# Patient Record
Sex: FEMALE | Race: BLACK OR AFRICAN AMERICAN | NOT HISPANIC OR LATINO | Employment: PART TIME | URBAN - METROPOLITAN AREA
[De-identification: names, ages, dates, MRNs, and addresses within clinical notes are randomized per-mention and may not be internally consistent; named-entity substitution may affect disease eponyms.]

---

## 2017-12-20 ENCOUNTER — OFFICE VISIT (OUTPATIENT)
Dept: FAMILY MEDICINE | Facility: CLINIC | Age: 27
End: 2017-12-20
Payer: MEDICAID

## 2017-12-20 VITALS
DIASTOLIC BLOOD PRESSURE: 100 MMHG | BODY MASS INDEX: 47.09 KG/M2 | HEIGHT: 66 IN | RESPIRATION RATE: 18 BRPM | WEIGHT: 293 LBS | OXYGEN SATURATION: 100 % | TEMPERATURE: 99.1 F | SYSTOLIC BLOOD PRESSURE: 142 MMHG | HEART RATE: 78 BPM

## 2017-12-20 DIAGNOSIS — L02.91 ABSCESS: ICD-10-CM

## 2017-12-20 DIAGNOSIS — Z72.0 TOBACCO ABUSE: ICD-10-CM

## 2017-12-20 DIAGNOSIS — I10 HYPERTENSION GOAL BP (BLOOD PRESSURE) < 140/90: Primary | ICD-10-CM

## 2017-12-20 DIAGNOSIS — E66.01 MORBID OBESITY WITH BMI OF 45.0-49.9, ADULT (H): ICD-10-CM

## 2017-12-20 DIAGNOSIS — N64.4 BREAST PAIN: ICD-10-CM

## 2017-12-20 PROCEDURE — 99203 OFFICE O/P NEW LOW 30 MIN: CPT | Performed by: FAMILY MEDICINE

## 2017-12-20 RX ORDER — LISINOPRIL AND HYDROCHLOROTHIAZIDE 20; 25 MG/1; MG/1
TABLET ORAL
COMMUNITY
Start: 2017-07-19 | End: 2017-12-20

## 2017-12-20 RX ORDER — TRIAMCINOLONE ACETONIDE 1 MG/G
CREAM TOPICAL 3 TIMES DAILY PRN
Qty: 30 G | Refills: 0 | Status: SHIPPED | OUTPATIENT
Start: 2017-12-20 | End: 2018-08-03

## 2017-12-20 RX ORDER — AMLODIPINE BESYLATE 5 MG/1
5 TABLET ORAL DAILY
Qty: 30 TABLET | Refills: 2 | Status: SHIPPED | OUTPATIENT
Start: 2017-12-20 | End: 2018-05-14

## 2017-12-20 RX ORDER — LISINOPRIL AND HYDROCHLOROTHIAZIDE 20; 25 MG/1; MG/1
1 TABLET ORAL DAILY
Qty: 30 TABLET | Refills: 2 | Status: SHIPPED | OUTPATIENT
Start: 2017-12-20 | End: 2018-05-14

## 2017-12-20 RX ORDER — MUPIROCIN CALCIUM 20 MG/G
CREAM TOPICAL 3 TIMES DAILY
Qty: 30 G | Refills: 0 | Status: SHIPPED | OUTPATIENT
Start: 2017-12-20 | End: 2018-07-02

## 2017-12-20 RX ORDER — OXYCODONE AND ACETAMINOPHEN 7.5; 325 MG/1; MG/1
1 TABLET ORAL 3 TIMES DAILY PRN
Qty: 40 TABLET | Refills: 0 | Status: SHIPPED | OUTPATIENT
Start: 2017-12-20 | End: 2018-05-14

## 2017-12-20 ASSESSMENT — PAIN SCALES - GENERAL: PAINLEVEL: WORST PAIN (10)

## 2017-12-20 NOTE — MR AVS SNAPSHOT
"              After Visit Summary   12/20/2017    Jigna Amador    MRN: [id number]           Patient Information     Date Of Birth          1990        Visit Information        Provider Department      12/20/2017 2:40 PM Barbara Esquivel MD [facility]        Today's Diagnoses     Hypertension goal BP (blood pressure) < 140/90    -  1    Morbid obesity with BMI of 45.0-49.9, adult (H)        Breast pain        Abscess        Tobacco abuse           Follow-ups after your visit        Follow-up notes from your care team     Return in about 6 weeks (around 1/31/2018).      Who to contact     If you have questions or need follow up information about today's clinic visit or your schedule please contact [facility] directly at [phone].  Normal or non-critical lab and imaging results will be communicated to you by MyChart, letter or phone within 4 business days after the clinic has received the results. If you do not hear from us within 7 days, please contact the clinic through MyChart or phone. If you have a critical or abnormal lab result, we will notify you by phone as soon as possible.  Submit refill requests through [employer] or call your pharmacy and they will forward the refill request to us. Please allow 3 business days for your refill to be completed.          Additional Information About Your Visit        MyChart Information     [employer] lets you send messages to your doctor, view your test results, renew your prescriptions, schedule appointments and more. To sign up, go to [email or web address] . Click on \"Log in\" on the left side of the screen, which will take you to the Welcome page. Then click on \"Sign up Now\" on the right side of the page.     You will be asked to enter the access code listed below, as well as some personal information. Please follow the directions to create your username and password.     Your access code is: [email or web address]  Expires: 3/20/2018  3:28 " "PM     Your access code will  in 90 days. If you need help or a new code, please call your [place] clinic or [phone].        Care EveryWhere ID     This is your Care EveryWhere ID. This could be used by other organizations to access your [place] medical records  [id number]        Your Vitals Were     Pulse Temperature Respirations Height Last Period Pulse Oximetry    78 99.1  F (37.3  C) (Oral) 18 1.67 m (5' 5.75\") 2017 100%    BMI (Body Mass Index)                   49.12 kg/m2            Blood Pressure from Last 3 Encounters:   17 (!) 142/100    Weight from Last 3 Encounters:   17 (!) 137 kg (302 lb)              Today, you had the following     No orders found for display         Today's Medication Changes          These changes are accurate as of: 17  3:28 PM.  If you have any questions, ask your nurse or doctor.               Start taking these medicines.        Dose/Directions    amLODIPine 5 MG tablet   Commonly known as:  NORVASC   Used for:  Hypertension goal BP (blood pressure) < 140/90   Started by:  Barbara Esquivel MD        Dose:  5 mg   Take 1 tablet (5 mg) by mouth daily   Quantity:  30 tablet   Refills:  2       mupirocin 2 % cream   Commonly known as:  BACTROBAN   Used for:  Abscess   Started by:  Barbara Esquivel MD        Apply topically 3 times daily   Quantity:  30 g   Refills:  0       oxyCODONE-acetaminophen 7.5-325 MG per tablet   Commonly known as:  PERCOCET   Used for:  Breast pain   Started by:  Barbara Esquivel MD        Dose:  1 tablet   Take 1 tablet by mouth 3 times daily as needed for pain   Quantity:  40 tablet   Refills:  0       triamcinolone 0.1 % cream   Commonly known as:  KENALOG   Used for:  Abscess   Started by:  Barbara Esquivel MD        Apply topically 3 times daily as needed for irritation   Quantity:  30 g   Refills:  0         These medicines have changed or have updated prescriptions.        " Dose/Directions    lisinopril-hydrochlorothiazide 20-25 MG per tablet   Commonly known as:  PRINZIDE/ZESTORETIC   This may have changed:    - how much to take  - when to take this   Used for:  Hypertension goal BP (blood pressure) < 140/90   Changed by:  Barbara Esquivel MD        Dose:  1 tablet   Take 1 tablet by mouth daily   Quantity:  30 tablet   Refills:  2            Where to get your medicines      These medications were sent to [facility] [address] [place] [address] [address] [facility]  [address] [facility] [address]     Phone:  [phone]     amLODIPine 5 MG tablet    lisinopril-hydrochlorothiazide 20-25 MG per tablet    mupirocin 2 % cream    triamcinolone 0.1 % cream         Some of these will need a paper prescription and others can be bought over the counter.  Ask your nurse if you have questions.     Bring a paper prescription for each of these medications     oxyCODONE-acetaminophen 7.5-325 MG per tablet                Primary Care Provider Office Phone # Fax #    Barbara Esquivel [phone] [address] [facility] [address]        Equal Access to Services     [facility] HospitalJAKOB AH: Hadii aad ku hadasho Soomaali, waaxda luqadaha, qaybta kaalmada adeegyada, waxay idiin hayaan adeemmy kharasofi bess. So [facility] [phone].    ATENCIÓN: Si habla español, tiene a harris disposición servicios gratGuadalupe County Hospitalos de asistencia lingüística. Llame al [phone].    We comply with applicable federal civil rights laws and [place] laws. We do not discriminate on the basis of race, color, national origin, age, disability, sex, sexual orientation, or gender identity.            Thank you!     Thank you for choosing [facility]  for your care. Our goal is always to provide you with excellent care. Hearing back from our patients is one way we can continue to improve our services. Please take a few minutes to complete the written survey that you may  receive in the mail after your visit with us. Thank you!             Your Updated Medication List - Protect others around you: Learn how to safely use, store and throw away your medicines at www.disposemymeds.org.          This list is accurate as of: 12/20/17  3:28 PM.  Always use your most recent med list.                   Brand Name Dispense Instructions for use Diagnosis    amLODIPine 5 MG tablet    NORVASC    30 tablet    Take 1 tablet (5 mg) by mouth daily    Hypertension goal BP (blood pressure) < 140/90       lisinopril-hydrochlorothiazide 20-25 MG per tablet    PRINZIDE/ZESTORETIC    30 tablet    Take 1 tablet by mouth daily    Hypertension goal BP (blood pressure) < 140/90       mupirocin 2 % cream    BACTROBAN    30 g    Apply topically 3 times daily    Abscess       oxyCODONE-acetaminophen 7.5-325 MG per tablet    PERCOCET    40 tablet    Take 1 tablet by mouth 3 times daily as needed for pain    Breast pain       triamcinolone 0.1 % cream    KENALOG    30 g    Apply topically 3 times daily as needed for irritation    Abscess

## 2017-12-20 NOTE — NURSING NOTE
"No chief complaint on file.      Initial Ht 1.67 m (5' 5.75\")  Wt (!) 137 kg (302 lb)  LMP 12/12/2017  BMI 49.12 kg/m2 Estimated body mass index is 49.12 kg/(m^2) as calculated from the following:    Height as of this encounter: 1.67 m (5' 5.75\").    Weight as of this encounter: 137 kg (302 lb).  Medication Reconciliation: complete     Kari Rivera      "

## 2017-12-20 NOTE — PROGRESS NOTES
SUBJECTIVE:   Jigna Amador is a 27 year old female who presents to clinic today for the following health issues:    Patient has Metoprolol on hand, but does not take it.     Concern - Breast pain   Onset: 2 weeks ago     Description:   Severe sharp pain in breasts     Intensity: severe    Progression of Symptoms:  worsening    Accompanying Signs & Symptoms:  None     Previous history of similar problem:   Patient had it over a year ago    Precipitating factors:   Worsened by: By the touch    Alleviating factors:  Improved by: Dermatologist prescribed Rx cream, which did work in the past.     Therapies Tried and outcome: None     SUBJECTIVE:  Here today to establish care regarding a few issues. Previous records reviewed through care everywhere and with the patient. Is under treatment for hypertension and has been prescribed Zestoretic and metoprolol. However the metoprolol makes her feel funny and she hasn't been taking it. Not surprisingly her blood pressure is elevated. Still with some prediabetes and morbid obesity as well. Has been counseled on dietary and exercise measures weight loss is been somewhat fruitless.  Has dealt with large breasts and even met with a plastic surgeon to consider mammo-pexing in the past. But was told she needs to quit smoking first and has not successfully done so. Looks like she was dealing with some chronic pain related to the breast as well and had been receiving 60+ Percocet 7.5 tablets for a number of months until about 6 months ago. Her provider left through [facility] and she was referred to a pain clinic but did not make that appointment. I discussed with the patient that I don't think the ongoing use of pain medication isn't appropriate treatment for her and I'm certainly not taking on new chronic pain patients. We can help temporarily but I would refer her to pain clinic as well. Has dealt with recurrent abscesses on her breast tissue and saw a dermatologist earlier  "this year. He prescribed some antibiotic ointment and triamcinolone. She lost her medical and was unable to get this.    Review of systems otherwise negative.  Past medical, family, and social history reviewed and updated in chart.    OBJECTIVE:  BP (!) 142/100 (BP Location: Right arm, Patient Position: Sitting, Cuff Size: Adult Large)  Pulse 78  Temp 99.1  F (37.3  C) (Oral)  Resp 18  Ht 1.67 m (5' 5.75\")  Wt (!) 137 kg (302 lb)  LMP 12/12/2017  SpO2 100%  BMI 49.12 kg/m2  Alert, pleasant, upbeat, and in no apparent discomfort.  Morbidly obese  S1 and S2 normal, no murmurs, clicks, gallops or rubs. Regular rate and rhythm. Chest is clear; no wheezes or rales. No edema or JVD.  Pendulous breasts with some scar tissue in place. There are couple of small healing abscesses with some crusted tissue present  Reviewed previous lab work through [facility]    ASSESSMENT / PLAN:  (I10) Hypertension goal BP (blood pressure) < 140/90  (primary encounter diagnosis)  Comment: Not adequately controlled and it sounds as though metoprolol is giving her side effects. I think a calcium channel blocker would be a good choice for her and we will continue Zestoretic and start amlodipine  Plan: lisinopril-hydrochlorothiazide         (PRINZIDE/ZESTORETIC) 20-25 MG per tablet,         amLODIPine (NORVASC) 5 MG tablet        Follow-up in 6-8 weeks for recheck    (E66.01,  Z68.42) Morbid obesity with BMI of 45.0-49.9, adult (H)  Comment: \"Counseled on dietary and weight loss measures  Plan:     (N64.4) Breast pain  Comment: I am not taking her on as a new chronic pain patient. I provided resources for [facility] pain clinic and will provide 1 small refill on  Plan: oxyCODONE-acetaminophen (PERCOCET) 7.5-325 MG         per tablet            (L02.91) Abscess  Comment: Discussed mechanism of action of the proposed medication, as well as potential effects, both good and bad.  Patient expressed understanding and agreed with " treatment.   Plan: mupirocin (BACTROBAN) 2 % cream, triamcinolone         (KENALOG) 0.1 % cream            (Z72.0) Tobacco abuse  Comment: Counseled to quit smoking  Plan:     Follow up 6-8 weeks for blood pressure recheck  SEwa Esquivel MD    (Chart documentation completed in part with Dragon voice-recognition software.  Even though reviewed some grammatical, spelling, and word errors may remain.)

## 2017-12-21 ENCOUNTER — TELEPHONE (OUTPATIENT)
Dept: FAMILY MEDICINE | Facility: CLINIC | Age: 27
End: 2017-12-21

## 2017-12-21 DIAGNOSIS — L02.91 ABSCESS: Primary | ICD-10-CM

## 2017-12-22 RX ORDER — CEPHALEXIN 500 MG/1
500 CAPSULE ORAL 4 TIMES DAILY
Qty: 40 CAPSULE | Refills: 0 | Status: SHIPPED | OUTPATIENT
Start: 2017-12-22 | End: 2018-06-12

## 2017-12-22 NOTE — TELEPHONE ENCOUNTER
Prescription for kelfex 500mg four times a day for 10 days.   Follow up if no improvement over the next 3-4 days.  Return urgently if any change in symptoms.    Please call and inform patient

## 2017-12-22 NOTE — TELEPHONE ENCOUNTER
Per fax from [facility] ([phone]) PA denied for Bactroban 2%. Please send in alternative rx.

## 2017-12-24 ENCOUNTER — HEALTH MAINTENANCE LETTER (OUTPATIENT)
Age: 27
End: 2017-12-24

## 2018-05-14 ENCOUNTER — OFFICE VISIT (OUTPATIENT)
Dept: FAMILY MEDICINE | Facility: CLINIC | Age: 28
End: 2018-05-14
Payer: COMMERCIAL

## 2018-05-14 VITALS
OXYGEN SATURATION: 100 % | TEMPERATURE: 97.9 F | DIASTOLIC BLOOD PRESSURE: 98 MMHG | WEIGHT: 293 LBS | HEIGHT: 66 IN | HEART RATE: 76 BPM | SYSTOLIC BLOOD PRESSURE: 162 MMHG | BODY MASS INDEX: 47.09 KG/M2 | RESPIRATION RATE: 18 BRPM

## 2018-05-14 DIAGNOSIS — Z12.4 SCREENING FOR MALIGNANT NEOPLASM OF CERVIX: ICD-10-CM

## 2018-05-14 DIAGNOSIS — L02.92 BOIL: ICD-10-CM

## 2018-05-14 DIAGNOSIS — I10 HYPERTENSION GOAL BP (BLOOD PRESSURE) < 140/90: Primary | ICD-10-CM

## 2018-05-14 PROCEDURE — 99214 OFFICE O/P EST MOD 30 MIN: CPT | Performed by: FAMILY MEDICINE

## 2018-05-14 RX ORDER — LISINOPRIL AND HYDROCHLOROTHIAZIDE 20; 25 MG/1; MG/1
1 TABLET ORAL DAILY
Qty: 90 TABLET | Refills: 0 | Status: SHIPPED | OUTPATIENT
Start: 2018-05-14 | End: 2018-06-12

## 2018-05-14 RX ORDER — CEPHALEXIN 500 MG/1
500 CAPSULE ORAL 3 TIMES DAILY
Qty: 21 CAPSULE | Refills: 0 | Status: SHIPPED | OUTPATIENT
Start: 2018-05-14 | End: 2018-05-21

## 2018-05-14 RX ORDER — OXYCODONE AND ACETAMINOPHEN 7.5; 325 MG/1; MG/1
1 TABLET ORAL 3 TIMES DAILY PRN
Qty: 40 TABLET | Refills: 0 | Status: SHIPPED | OUTPATIENT
Start: 2018-05-14 | End: 2018-06-12

## 2018-05-14 RX ORDER — AMLODIPINE BESYLATE 10 MG/1
10 TABLET ORAL DAILY
Qty: 90 TABLET | Refills: 0 | Status: SHIPPED | OUTPATIENT
Start: 2018-05-14 | End: 2018-06-12

## 2018-05-14 ASSESSMENT — PAIN SCALES - GENERAL: PAINLEVEL: WORST PAIN (10)

## 2018-05-14 NOTE — Clinical Note
Please abstract the following data from this visit with this patient into the appropriate field in Epic:  Pap smear done on this date: 6/24/14 (approximately), by this group: [facility], results were normal.

## 2018-05-14 NOTE — MR AVS SNAPSHOT
"              After Visit Summary   5/14/2018    Jigna Amador    MRN: [id number]           Patient Information     Date Of Birth          1990        Visit Information        Provider Department      5/14/2018 12:00 PM Barbara Esquivel MD [facility]        Today's Diagnoses     Hypertension goal BP (blood pressure) < 140/90    -  1    Boil        Screening for malignant neoplasm of cervix           Follow-ups after your visit        Follow-up notes from your care team     Return in about 3 months (around 8/14/2018).      Your next 10 appointments already scheduled     Jun 12, 2018  2:20 PM CDT   PHYSICAL with Milana Amaro PA-C   [facility]    [address] [facility] [address] [phone]              Who to contact     If you have questions or need follow up information about today's clinic visit or your schedule please contact [facility] directly at [phone].  Normal or non-critical lab and imaging results will be communicated to you by [employer], letter or phone within 4 business days after the clinic has received the results. If you do not hear from us within 7 days, please contact the clinic through [employer] or phone. If you have a critical or abnormal lab result, we will notify you by phone as soon as possible.  Submit refill requests through [employer] or call your pharmacy and they will forward the refill request to us. Please allow 3 business days for your refill to be completed.          Additional Information About Your Visit        MyChart Information     [employer] lets you send messages to your doctor, view your test results, renew your prescriptions, schedule appointments and more. To sign up, go to [email or web address] . Click on \"Log in\" on the left side of the screen, which will take you to the Welcome page. Then click on \"Sign up Now\" on the right side of the page.     You will be " "asked to enter the access code listed below, as well as some personal information. Please follow the directions to create your username and password.     Your access code is: [email or web address]  Expires: 2018 12:45 PM     Your access code will  in 90 days. If you need help or a new code, please call your [place] clinic or [phone].        Care EveryWhere ID     This is your Care EveryWhere ID. This could be used by other organizations to access your [place] medical records  [id number]        Your Vitals Were     Pulse Temperature Respirations Height Last Period Pulse Oximetry    76 97.9  F (36.6  C) (Oral) 18 1.664 m (5' 5.5\") 2018 100%    BMI (Body Mass Index)                   48.67 kg/m2            Blood Pressure from Last 3 Encounters:   18 (!) 162/98   17 (!) 142/100    Weight from Last 3 Encounters:   18 134.7 kg (297 lb)   17 (!) 137 kg (302 lb)              Today, you had the following     No orders found for display         Today's Medication Changes          These changes are accurate as of 18 12:45 PM.  If you have any questions, ask your nurse or doctor.               These medicines have changed or have updated prescriptions.        Dose/Directions    amLODIPine 10 MG tablet   Commonly known as:  NORVASC   This may have changed:    - medication strength  - how much to take   Used for:  Hypertension goal BP (blood pressure) < 140/90   Changed by:  Barbara Esquivel MD        Dose:  10 mg   Take 1 tablet (10 mg) by mouth daily   Quantity:  90 tablet   Refills:  0       * cephALEXin 500 MG capsule   Commonly known as:  KEFLEX   This may have changed:  Another medication with the same name was added. Make sure you understand how and when to take each.   Used for:  Abscess   Changed by:  Barbara Esquivel MD        Dose:  500 mg   Take 1 capsule (500 mg) by mouth 4 times daily   Quantity:  40 capsule   Refills:  0       * cephALEXin 500 MG capsule "   Commonly known as:  KEFLEX   This may have changed:  You were already taking a medication with the same name, and this prescription was added. Make sure you understand how and when to take each.   Used for:  Boil   Changed by:  Barbara Esquivel MD        Dose:  500 mg   Take 1 capsule (500 mg) by mouth 3 times daily for 7 days   Quantity:  21 capsule   Refills:  0       * Notice:  This list has 2 medication(s) that are the same as other medications prescribed for you. Read the directions carefully, and ask your doctor or other care provider to review them with you.         Where to get your medicines      These medications were sent to [facility] [address] [place] [address] [address] [facility]  [address] [facility] [address]     Phone:  [phone]     amLODIPine 10 MG tablet    cephALEXin 500 MG capsule    lisinopril-hydrochlorothiazide 20-25 MG per tablet         Some of these will need a paper prescription and others can be bought over the counter.  Ask your nurse if you have questions.     Bring a paper prescription for each of these medications     oxyCODONE-acetaminophen 7.5-325 MG per tablet               Information about OPIOIDS     PRESCRIPTION OPIOIDS: WHAT YOU NEED TO KNOW   You have a prescription for an opioid (narcotic) pain medicine. Opioids can cause addiction. If you have a history of chemical dependency of any type, you are at a higher risk of becoming addicted to opioids. Only take this medicine after all other options have been tried. Take it for as short a time and as few doses as possible.     Do not:    Drive. If you drive while taking these medicines, you could be arrested for driving under the influence (DUI).    Operate heavy machinery    Do any other dangerous activities while taking these medicines.     Drink any alcohol while taking these medicines.      Take with any other medicines that contain acetaminophen. Read all labels carefully. Look for the word   acetaminophen  or  Tylenol.  Ask your pharmacist if you have questions or are unsure.    Store your pills in a secure place, locked if possible. We will not replace any lost or stolen medicine. If you don t finish your medicine, please throw away (dispose) as directed by your pharmacist. The [place] Pollution [employer] Agency has more information about safe disposal: https://www.pca.[facility].[place]/living-green/managing-unwanted-medications    All opioids tend to cause constipation. Drink plenty of water and eat foods that have a lot of fiber, such as fruits, vegetables, prune juice, apple juice and high-fiber cereal. Take a laxative (Miralax, milk of magnesia, Colace, Senna) if you don t move your bowels at least every other day.          Primary Care Provider Office Phone # Fax #    Barbara Jose Esquivel [phone] [address] [facility] [address]        Equal Access to Services     NURY URIARTE : Hadii juni ku hadasho Soomaali, waaxda luqadaha, qaybta kaalmada adeegyada, waxay bradyin haychas kumar . So [facility] [phone].    ATENCIÓN: Si habla español, tiene a harris disposición servicios gratuitos de asistencia lingüística. Llame al [phone].    We comply with applicable federal civil rights laws and [place] laws. We do not discriminate on the basis of race, color, national origin, age, disability, sex, sexual orientation, or gender identity.            Thank you!     Thank you for choosing [facility]  for your care. Our goal is always to provide you with excellent care. Hearing back from our patients is one way we can continue to improve our services. Please take a few minutes to complete the written survey that you may receive in the mail after your visit with us. Thank you!             Your Updated Medication List - Protect others around you: Learn how to safely use, store and throw away your medicines at www.disposemymeds.org.          This list  is accurate as of 5/14/18 12:45 PM.  Always use your most recent med list.                   Brand Name Dispense Instructions for use Diagnosis    amLODIPine 10 MG tablet    NORVASC    90 tablet    Take 1 tablet (10 mg) by mouth daily    Hypertension goal BP (blood pressure) < 140/90       * cephALEXin 500 MG capsule    KEFLEX    40 capsule    Take 1 capsule (500 mg) by mouth 4 times daily    Abscess       * cephALEXin 500 MG capsule    KEFLEX    21 capsule    Take 1 capsule (500 mg) by mouth 3 times daily for 7 days    Boil       lisinopril-hydrochlorothiazide 20-25 MG per tablet    PRINZIDE/ZESTORETIC    90 tablet    Take 1 tablet by mouth daily    Hypertension goal BP (blood pressure) < 140/90       mupirocin 2 % cream    BACTROBAN    30 g    Apply topically 3 times daily    Abscess       oxyCODONE-acetaminophen 7.5-325 MG per tablet    PERCOCET    40 tablet    Take 1 tablet by mouth 3 times daily as needed for pain    Boil       triamcinolone 0.1 % cream    KENALOG    30 g    Apply topically 3 times daily as needed for irritation    Abscess       * Notice:  This list has 2 medication(s) that are the same as other medications prescribed for you. Read the directions carefully, and ask your doctor or other care provider to review them with you.

## 2018-05-14 NOTE — PROGRESS NOTES
"  SUBJECTIVE:   Jigna Amador is a 28 year old female who presents to clinic today for the following health issues:      Hypertension Follow-up      Outpatient blood pressures are not being checked.    Low Salt Diet: low salt      Amount of exercise or physical activity: 6-7 days/week for an average of walks everyday, 3 blocks     Problems taking medications regularly: No    Medication side effects: none    Diet: low salt    SUBJECTIVE:  Here today in follow-up of hypertension.  When I met her a few months ago we stopped her metoprolol since it was causing drowsiness and changed her from metoprolol to amlodipine 5 mg daily.  Continued Zestoretic.  Doing well from the standpoint with no side effects whatsoever.  Has not really followed blood pressure much on the outside.  Weight is still an ongoing issue but she is working on some exercise.  Has dealt with some chronic pain in the past and I declined to become a chronic pain provider for her.  Gave her resources to [facility] pain clinic but I do not believe she is scheduled.  Has developed a boil in her groin region.  Present for a couple of days and is quite painful.  Has not really drained yet.  We discussed possibly lancing this but she'd like to defer given that she just started her menstrual cycle.  She said she would see her provider every year for Pap smears the last we see on file through [facility] is 2014.  Discussed screening for this in the near future.    Review of systems otherwise negative.  Past medical, family, and social history reviewed and updated in chart.    OBJECTIVE:  BP (!) 162/98 (BP Location: Right arm, Patient Position: Sitting, Cuff Size: Adult Large)  Pulse 76  Temp 97.9  F (36.6  C) (Oral)  Resp 18  Ht 1.664 m (5' 5.5\")  Wt 134.7 kg (297 lb)  LMP 04/13/2018  SpO2 100%  BMI 48.67 kg/m2  Alert, pleasant, upbeat, and in no apparent discomfort.  Obese  S1 and S2 normal, no murmurs, clicks, gallops or rubs. Regular rate and " rhythm. Chest is clear; no wheezes or rales. No edema or JVD..labrev     ASSESSMENT / PLAN:  (I10) Hypertension goal BP (blood pressure) < 140/90  (primary encounter diagnosis)  Comment: Not yet at goal but doing better on the amlodipine.  Continue Zestoretic and increase amlodipine to 10 mg daily.  Follow-up in a few months for recheck  Plan: amLODIPine (NORVASC) 10 MG tablet,         lisinopril-hydrochlorothiazide         (PRINZIDE/ZESTORETIC) 20-25 MG per tablet            (L02.92) Boil  Comment: Discussed options and will try a short course of antibiotics and hot packs.  If it does not pop on its own we can barb it  Plan: oxyCODONE-acetaminophen (PERCOCET) 7.5-325 MG         per tablet, cephALEXin (KEFLEX) 500 MG capsule            (Z12.4) Screening for malignant neoplasm of cervix  Comment: Advised setting up screening for this in the near future  Plan:     Follow up as above   BERTHA Esquivel MD    (Chart documentation completed in part with Dragon voice-recognition software.  Even though reviewed some grammatical, spelling, and word errors may remain.)

## 2018-06-08 ENCOUNTER — TELEPHONE (OUTPATIENT)
Dept: FAMILY MEDICINE | Facility: CLINIC | Age: 28
End: 2018-06-08

## 2018-06-08 NOTE — TELEPHONE ENCOUNTER
Panel Management Review      BP Readings from Last 1 Encounters:   05/14/18 (!) 162/98      Last Office Visit with this department: 5/14/2018        Patient is due/failing the following:   BP CHECK    Action needed:   Patient needs office visit for LAB ONLY blood pressure recheck.    Type of outreach:    Sent letter.    Kari Rivera, Medical Assistant

## 2018-06-08 NOTE — LETTER
June 8, 2018      Jigna Amador  [address] [address]        Dear Jigna,         Please call [phone] to schedule a LAB ONLY blood pressure recheck appointment.      Thanks,   [facility]

## 2018-06-12 ENCOUNTER — OFFICE VISIT (OUTPATIENT)
Dept: FAMILY MEDICINE | Facility: CLINIC | Age: 28
End: 2018-06-12
Payer: COMMERCIAL

## 2018-06-12 ENCOUNTER — MYC REFILL (OUTPATIENT)
Dept: FAMILY MEDICINE | Facility: CLINIC | Age: 28
End: 2018-06-12

## 2018-06-12 VITALS
DIASTOLIC BLOOD PRESSURE: 102 MMHG | OXYGEN SATURATION: 100 % | SYSTOLIC BLOOD PRESSURE: 178 MMHG | WEIGHT: 293 LBS | TEMPERATURE: 99 F | HEIGHT: 66 IN | BODY MASS INDEX: 47.09 KG/M2 | HEART RATE: 79 BPM | RESPIRATION RATE: 18 BRPM

## 2018-06-12 DIAGNOSIS — N64.4 BREAST PAIN: Primary | ICD-10-CM

## 2018-06-12 DIAGNOSIS — J02.9 SORE THROAT: ICD-10-CM

## 2018-06-12 DIAGNOSIS — Z13.220 SCREENING FOR HYPERLIPIDEMIA: ICD-10-CM

## 2018-06-12 DIAGNOSIS — Z72.0 TOBACCO ABUSE: ICD-10-CM

## 2018-06-12 DIAGNOSIS — Z13.1 SCREENING FOR DIABETES MELLITUS: ICD-10-CM

## 2018-06-12 DIAGNOSIS — Z11.3 SCREENING FOR STDS (SEXUALLY TRANSMITTED DISEASES): ICD-10-CM

## 2018-06-12 DIAGNOSIS — B96.89 BACTERIAL VAGINOSIS: ICD-10-CM

## 2018-06-12 DIAGNOSIS — J02.0 STREPTOCOCCAL PHARYNGITIS: ICD-10-CM

## 2018-06-12 DIAGNOSIS — N76.0 BACTERIAL VAGINOSIS: ICD-10-CM

## 2018-06-12 DIAGNOSIS — I10 HYPERTENSION GOAL BP (BLOOD PRESSURE) < 140/90: ICD-10-CM

## 2018-06-12 DIAGNOSIS — E66.01 MORBID OBESITY WITH BMI OF 45.0-49.9, ADULT (H): Primary | ICD-10-CM

## 2018-06-12 DIAGNOSIS — Z12.4 SCREENING FOR MALIGNANT NEOPLASM OF CERVIX: ICD-10-CM

## 2018-06-12 DIAGNOSIS — Z11.4 SCREENING FOR HIV (HUMAN IMMUNODEFICIENCY VIRUS): ICD-10-CM

## 2018-06-12 LAB
ALBUMIN SERPL-MCNC: 4 G/DL (ref 3.4–5)
ALP SERPL-CCNC: 57 U/L (ref 40–150)
ALT SERPL W P-5'-P-CCNC: 18 U/L (ref 0–50)
ANION GAP SERPL CALCULATED.3IONS-SCNC: 7 MMOL/L (ref 3–14)
AST SERPL W P-5'-P-CCNC: 12 U/L (ref 0–45)
BILIRUB SERPL-MCNC: 0.5 MG/DL (ref 0.2–1.3)
BUN SERPL-MCNC: 10 MG/DL (ref 7–30)
CALCIUM SERPL-MCNC: 9.1 MG/DL (ref 8.5–10.1)
CHLORIDE SERPL-SCNC: 105 MMOL/L (ref 94–109)
CHOLEST SERPL-MCNC: 113 MG/DL
CO2 SERPL-SCNC: 26 MMOL/L (ref 20–32)
CREAT SERPL-MCNC: 0.76 MG/DL (ref 0.52–1.04)
DEPRECATED S PYO AG THROAT QL EIA: ABNORMAL
GFR SERPL CREATININE-BSD FRML MDRD: 89 ML/MIN/1.7M2
GLUCOSE SERPL-MCNC: 97 MG/DL (ref 70–99)
HDLC SERPL-MCNC: 51 MG/DL
LDLC SERPL CALC-MCNC: 46 MG/DL
NONHDLC SERPL-MCNC: 62 MG/DL
POTASSIUM SERPL-SCNC: 4.3 MMOL/L (ref 3.4–5.3)
PROT SERPL-MCNC: 7.5 G/DL (ref 6.8–8.8)
SODIUM SERPL-SCNC: 138 MMOL/L (ref 133–144)
SPECIMEN SOURCE: ABNORMAL
SPECIMEN SOURCE: ABNORMAL
TRIGL SERPL-MCNC: 80 MG/DL
WET PREP SPEC: ABNORMAL

## 2018-06-12 PROCEDURE — 99214 OFFICE O/P EST MOD 30 MIN: CPT | Performed by: PHYSICIAN ASSISTANT

## 2018-06-12 PROCEDURE — G0145 SCR C/V CYTO,THINLAYER,RESCR: HCPCS | Performed by: PHYSICIAN ASSISTANT

## 2018-06-12 PROCEDURE — 80053 COMPREHEN METABOLIC PANEL: CPT | Performed by: PHYSICIAN ASSISTANT

## 2018-06-12 PROCEDURE — 87880 STREP A ASSAY W/OPTIC: CPT | Performed by: PHYSICIAN ASSISTANT

## 2018-06-12 PROCEDURE — 36415 COLL VENOUS BLD VENIPUNCTURE: CPT | Performed by: PHYSICIAN ASSISTANT

## 2018-06-12 PROCEDURE — G0476 HPV COMBO ASSAY CA SCREEN: HCPCS | Performed by: PHYSICIAN ASSISTANT

## 2018-06-12 PROCEDURE — 80061 LIPID PANEL: CPT | Performed by: PHYSICIAN ASSISTANT

## 2018-06-12 PROCEDURE — 87591 N.GONORRHOEAE DNA AMP PROB: CPT | Performed by: PHYSICIAN ASSISTANT

## 2018-06-12 PROCEDURE — 87491 CHLMYD TRACH DNA AMP PROBE: CPT | Performed by: PHYSICIAN ASSISTANT

## 2018-06-12 PROCEDURE — 87389 HIV-1 AG W/HIV-1&-2 AB AG IA: CPT | Performed by: PHYSICIAN ASSISTANT

## 2018-06-12 PROCEDURE — 87210 SMEAR WET MOUNT SALINE/INK: CPT | Performed by: PHYSICIAN ASSISTANT

## 2018-06-12 PROCEDURE — 86706 HEP B SURFACE ANTIBODY: CPT | Performed by: PHYSICIAN ASSISTANT

## 2018-06-12 PROCEDURE — 87340 HEPATITIS B SURFACE AG IA: CPT | Performed by: PHYSICIAN ASSISTANT

## 2018-06-12 PROCEDURE — 86803 HEPATITIS C AB TEST: CPT | Performed by: PHYSICIAN ASSISTANT

## 2018-06-12 RX ORDER — METRONIDAZOLE 500 MG/1
500 TABLET ORAL 2 TIMES DAILY
Qty: 14 TABLET | Refills: 0 | Status: SHIPPED | OUTPATIENT
Start: 2018-06-12 | End: 2018-07-02

## 2018-06-12 RX ORDER — AMOXICILLIN 875 MG
875 TABLET ORAL 2 TIMES DAILY
Qty: 20 TABLET | Refills: 0 | Status: SHIPPED | OUTPATIENT
Start: 2018-06-12 | End: 2018-07-02

## 2018-06-12 RX ORDER — AMLODIPINE BESYLATE 10 MG/1
10 TABLET ORAL DAILY
Qty: 30 TABLET | Refills: 1 | Status: SHIPPED | OUTPATIENT
Start: 2018-06-12 | End: 2019-01-14 | Stop reason: ALTCHOICE

## 2018-06-12 RX ORDER — LISINOPRIL AND HYDROCHLOROTHIAZIDE 20; 25 MG/1; MG/1
1 TABLET ORAL DAILY
Qty: 30 TABLET | Refills: 1 | Status: SHIPPED | OUTPATIENT
Start: 2018-06-12 | End: 2018-07-02

## 2018-06-12 ASSESSMENT — PAIN SCALES - GENERAL: PAINLEVEL: EXTREME PAIN (8)

## 2018-06-12 NOTE — PATIENT INSTRUCTIONS
Schedule follow up with us in one month while taking medications to recheck blood pressure.   Take amoxicillin twice a day for 10 days   Take flagyl twice a day for 7 days.  Do not drink any alcohol   Follow up with us if any change in symptoms  We need to see you back in approximately 3-4 weeks to make sure your medications are controlling your blood pressure  Schedule appointment with nutritionist at [facility] ([phone]).             Preventive Health Recommendations  Female Ages 26 - 39  Yearly exam:   See your health care provider every year in order to    Review health changes.     Discuss preventive care.      Review your medicines if you your doctor has prescribed any.    Until age 30: Get a Pap test every three years (more often if you have had an abnormal result).    After age 30: Talk to your doctor about whether you should have a Pap test every 3 years or have a Pap test with HPV screening every 5 years.   You do not need a Pap test if your uterus was removed (hysterectomy) and you have not had cancer.  You should be tested each year for STDs (sexually transmitted diseases), if you're at risk.   Talk to your provider about how often to have your cholesterol checked.  If you are at risk for diabetes, you should have a diabetes test (fasting glucose).  Shots: Get a flu shot each year. Get a tetanus shot every 10 years.   Nutrition:     Eat at least 5 servings of fruits and vegetables each day.    Eat whole-grain bread, whole-wheat pasta and brown rice instead of white grains and rice.    Talk to your provider about Calcium and Vitamin D.     Lifestyle    Exercise at least 150 minutes a week (30 minutes a day, 5 days of the week). This will help you control your weight and prevent disease.    Limit alcohol to one drink per day.    No smoking.     Wear sunscreen to prevent skin cancer.    See your dentist every six months for an exam and cleaning.

## 2018-06-12 NOTE — PROGRESS NOTES
SUBJECTIVE:   CC: Jigna Amador is an 28 year old woman who presents for preventive health visit.     Healthy Habits:    Do you get at least three servings of calcium containing foods daily (dairy, green leafy vegetables, etc.)? yes    Amount of exercise or daily activities, outside of work: none    Problems taking medications regularly No    Medication side effects: No    Have you had an eye exam in the past two years? unsure    Do you see a dentist twice per year? no    Do you have sleep apnea, excessive snoring or daytime drowsiness?no      Possible Boil  Duration of complaint: Yesterday       Today's PHQ-2 Score:   PHQ-2 ( 1999 [employer]) 6/13/2018 5/14/2018   Q1: Little interest or pleasure in doing things 0 0   Q2: Feeling down, depressed or hopeless 0 0   PHQ-2 Score 0 0       Abuse: Current or Past(Physical, Sexual or Emotional)- No  Do you feel safe in your environment - Yes    Social History   Substance Use Topics     Smoking status: Current Some Day Smoker     Smokeless tobacco: Current User     Alcohol use No     If you drink alcohol do you typically have >3 drinks per day or >7 drinks per week? No                     Reviewed orders with patient.  Reviewed health maintenance and updated orders accordingly - Yes  BP Readings from Last 3 Encounters:   06/12/18 (!) 178/102   05/14/18 (!) 162/98   12/20/17 (!) 142/100    Wt Readings from Last 3 Encounters:   06/12/18 136.5 kg (301 lb)   05/14/18 134.7 kg (297 lb)   12/20/17 (!) 137 kg (302 lb)                  Patient Active Problem List   Diagnosis     Hypertension goal BP (blood pressure) < 140/90     Morbid obesity with BMI of 45.0-49.9, adult (H)     Tobacco abuse     Breast pain     History reviewed. No pertinent surgical history.    Social History   Substance Use Topics     Smoking status: Current Some Day Smoker     Smokeless tobacco: Current User     Alcohol use No     History reviewed. No pertinent family history.      Current Outpatient  Prescriptions   Medication Sig Dispense Refill     amLODIPine (NORVASC) 10 MG tablet Take 1 tablet (10 mg) by mouth daily 30 tablet 1     amoxicillin (AMOXIL) 875 MG tablet Take 1 tablet (875 mg) by mouth 2 times daily 20 tablet 0     lisinopril-hydrochlorothiazide (PRINZIDE/ZESTORETIC) 20-25 MG per tablet Take 1 tablet by mouth daily 30 tablet 1     metroNIDAZOLE (FLAGYL) 500 MG tablet Take 1 tablet (500 mg) by mouth 2 times daily 14 tablet 0     mupirocin (BACTROBAN) 2 % cream Apply topically 3 times daily 30 g 0     triamcinolone (KENALOG) 0.1 % cream Apply topically 3 times daily as needed for irritation 30 g 0     oxyCODONE-acetaminophen (PERCOCET) 7.5-325 MG per tablet Take 1 tablet by mouth 3 times daily as needed for pain 20 tablet 0       Mammogram not appropriate for this patient based on age.    Pertinent mammograms are reviewed under the imaging tab.  History of abnormal Pap smear: NO - age 30- 65 PAP every 3 years recommended    Reviewed and updated as needed this visit by clinical staff  Tobacco  Allergies  Meds  Med Hx  Surg Hx  Fam Hx  Soc Hx        Reviewed and updated as needed this visit by Provider  Tobacco  Med Hx  Surg Hx  Fam Hx  Soc Hx         Has been off blood pressure medications for about four days.  Reports that she ran out of medications but this seems a little puzzling to me because looks like given 90 day supply of blood pressure medications on 5/14/18 (less than one month ago)  Little chest pain and sore throat think because got out of tub and went outside.  Doesn't want a a summer cold.    Coughing and throat feels sore.   Tingling throat.  3 days.  No fever.    Smokes pack every couple days.  Interested in patch.    Didn't take blood pressure med before came to the doctor she reports prior to last visit as well   Boil smaller but not resolved completely right buttock   Completed course of keflex  Nothing for contraception.  Sexually active Declines any additional  "contraception other than condoms- would not be candidate for oral contraceptive pill given blood pressure   Using condoms every time.   Regular periods couple of days late  Some discharge clear in color.  No pain.   Works as [employer]   Interested in sexually transmitted disease testing.  Is fasting today  Reports that she is eating a lot of junk food and no exercise    ROS:  CONSTITUTIONAL: NEGATIVE for fever, chills, change in weight  INTEGUMENTARU/SKIN: NEGATIVE for worrisome rashes, moles or lesions  EYES: NEGATIVE for vision changes or irritation  ENT: has sore throat and cough for 3 days.  No shortness of breath   RESP:as above  BREAST: right breast pain  CV: NEGATIVE for chest pain, palpitations or peripheral edema  GI: NEGATIVE for nausea, abdominal pain, heartburn, or change in bowel habits  : NEGATIVE for unusual urinary or vaginal symptoms. Periods are regular.  MUSCULOSKELETAL: NEGATIVE for significant arthralgias or myalgia  NEURO: NEGATIVE for weakness, dizziness or paresthesias  PSYCHIATRIC: NEGATIVE for changes in mood or affect    OBJECTIVE:   BP (!) 178/102  Pulse 79  Temp 99  F (37.2  C) (Oral)  Resp 18  Ht 1.664 m (5' 5.5\")  Wt 136.5 kg (301 lb)  LMP 05/13/2018 (Approximate)  SpO2 100%  Breastfeeding? No  BMI 49.33 kg/m2  EXAM:  GENERAL: alert, no distress and obese  EYES: Eyes grossly normal to inspection, PERRL and conjunctivae and sclerae normal  HENT: ear canals and TM's normal, nose and mouth without ulcers or lesions  NECK: no adenopathy, no asymmetry, masses, or scars and thyroid normal to palpation  RESP: lungs clear to auscultation - no rales, rhonchi or wheezes  BREAST: right breast tenderness.  Large pendulous breasts.    CV: regular rate and rhythm, normal S1 S2, no S3 or S4, no murmur, click or rub, no peripheral edema and peripheral pulses strong  ABDOMEN: soft, nontender, no hepatosplenomegaly, no masses and bowel sounds normal   (female): normal female external genitalia, " normal urethral meatus , vaginal mucosa pink, moist, well rugated and exam very difficult given body habitus.  Cannot visualize cervix. Unable to assess uterine size and no palpable adnexal masses.   MS: no gross musculoskeletal defects noted, no edema  SKIN: right breast with localized area of tenderness.  No fluctuance or erythema.  Approximately 1 centimeter diameter  Right buttock with no erythema or area of fluctuance but tender to palpation- approximately 1 centimeter diameter  NEURO: Normal strength and tone, mentation intact and speech normal  PSYCH: mentation appears normal, affect normal/bright    ASSESSMENT/PLAN:   1. Morbid obesity with BMI of 45.0-49.9, adult (H)  Encouraged portion control and avoid junk food.  Follow up with nutrionist  - Comprehensive metabolic panel  - NUTRITION REFERRAL    2. Hypertension goal BP (blood pressure) < 140/90  Blood pressure not at goal but reports she has been out of med for 4 days.  Restart medications and follow up with us within the month to see if blood pressure at goal with med or needs additional med   - Comprehensive metabolic panel  - lisinopril-hydrochlorothiazide (PRINZIDE/ZESTORETIC) 20-25 MG per tablet; Take 1 tablet by mouth daily  Dispense: 30 tablet; Refill: 1  - amLODIPine (NORVASC) 10 MG tablet; Take 1 tablet (10 mg) by mouth daily  Dispense: 30 tablet; Refill: 1    3. Streptococcal pharyngitis  Will treat with amoxicillin   - amoxicillin (AMOXIL) 875 MG tablet; Take 1 tablet (875 mg) by mouth 2 times daily  Dispense: 20 tablet; Refill: 0    4. Bacterial vaginosis  Will treat with flagyl.  Advised no alcohol.    - metroNIDAZOLE (FLAGYL) 500 MG tablet; Take 1 tablet (500 mg) by mouth 2 times daily  Dispense: 14 tablet; Refill: 0    5. Screening for HIV (human immunodeficiency virus)  Will screen for HIV     6. Screening for malignant neoplasm of cervix  Blind sweep for pap since unable to visualize cervix  - Pap imaged thin layer screen with HPV -  "recommended age 30 - 65 years (select HPV order below)  - HPV High Risk Types DNA Cervical    7. Screening for hyperlipidemia  Is fasting today  - Lipid panel reflex to direct LDL Fasting    8. Screening for diabetes mellitus  Is fasting today  - Comprehensive metabolic panel    9. Screening for STDs (sexually transmitted diseases)    - HIV Screening  - Neisseria gonorrhoeae PCR  - Chlamydia trachomatis PCR  - Wet prep  - Hepatitis B Surface Antibody  - Hepatitis C antibody  - Hepatitis B surface antigen    10. Sore throat  Has strep  - Rapid strep screen    11. Tobacco abuse  Encouraged trial of nicoderm patch  - nicotine (NICODERM CQ) 14 MG/24HR 24 hr patch; Place 1 patch onto the skin every 24 hours  Dispense: 30 patch; Refill: 3    COUNSELING:   Reviewed preventive health counseling, as reflected in patient instructions       Regular exercise       Healthy diet/nutrition       Vision screening       Contraception       Safe sex practices/STD prevention       HIV screeninx in teen years, 1x in adult years, and at intervals if high risk         reports that she has been smoking.  She uses smokeless tobacco.  Tobacco Cessation Action Plan: Pharmacotherapies : Nicotine patch  Estimated body mass index is 48.67 kg/(m^2) as calculated from the following:    Height as of 18: 1.664 m (5' 5.5\").    Weight as of 18: 134.7 kg (297 lb).   Weight management plan: Discussed healthy diet and exercise guidelines and patient will follow up in 1 month in clinic to re-evaluate.    Counseling Resources:  ATP IV Guidelines  Pooled Cohorts Equation Calculator  Breast Cancer Risk Calculator  FRAX Risk Assessment  ICSI Preventive Guidelines  Dietary Guidelines for Americans,   USDA's MyPlate  ASA Prophylaxis  Lung CA Screening    Milana Amaro PA-C  [facility]"

## 2018-06-12 NOTE — MR AVS SNAPSHOT
After Visit Summary   6/12/2018    Jigna Amador    MRN: [id number]           Patient Information     Date Of Birth          1990        Visit Information        Provider Department      6/12/2018 2:20 PM Milana Amaro PA-C [facility]        Today's Diagnoses     Morbid obesity with BMI of 45.0-49.9, adult (H)    -  1    Screening for malignant neoplasm of cervix        Screening for HIV (human immunodeficiency virus)        Hypertension goal BP (blood pressure) < 140/90        Screening for hyperlipidemia        Screening for diabetes mellitus        Screening for STDs (sexually transmitted diseases)        Sore throat        Bacterial vaginosis        Streptococcal pharyngitis          Care Instructions    Schedule follow up with us in one month while taking medications to recheck blood pressure.   Take amoxicillin twice a day for 10 days   Take flagyl twice a day for 7 days.  Do not drink any alcohol   Follow up with us if any change in symptoms  We need to see you back in approximately 3-4 weeks to make sure your medications are controlling your blood pressure  Schedule appointment with nutritionist at [facility] ([phone]).             Preventive Health Recommendations  Female Ages 26 - 39  Yearly exam:   See your health care provider every year in order to    Review health changes.     Discuss preventive care.      Review your medicines if you your doctor has prescribed any.    Until age 30: Get a Pap test every three years (more often if you have had an abnormal result).    After age 30: Talk to your doctor about whether you should have a Pap test every 3 years or have a Pap test with HPV screening every 5 years.   You do not need a Pap test if your uterus was removed (hysterectomy) and you have not had cancer.  You should be tested each year for STDs (sexually transmitted diseases), if you're at risk.   Talk to your provider  about how often to have your cholesterol checked.  If you are at risk for diabetes, you should have a diabetes test (fasting glucose).  Shots: Get a flu shot each year. Get a tetanus shot every 10 years.   Nutrition:     Eat at least 5 servings of fruits and vegetables each day.    Eat whole-grain bread, whole-wheat pasta and brown rice instead of white grains and rice.    Talk to your provider about Calcium and Vitamin D.     Lifestyle    Exercise at least 150 minutes a week (30 minutes a day, 5 days of the week). This will help you control your weight and prevent disease.    Limit alcohol to one drink per day.    No smoking.     Wear sunscreen to prevent skin cancer.    See your dentist every six months for an exam and cleaning.            Follow-ups after your visit        Additional Services     NUTRITION REFERRAL       Your provider has referred you to: [facility]: [facility] [phone]   [email or web address]    Please be aware that coverage of these services is subject to the terms and limitations of your health insurance plan.  Call member services at your health plan with any benefit or coverage questions.      Please bring the following with you to your appointment:    (1) This referral request  (2) Any documents given to you regarding this referral  (3) Any specific questions you have about diet and/or food choices                  Your next 10 appointments already scheduled     Jul 10, 2018 10:20 AM CDT   Office Visit with Milana Amaro PA-C   [facility]    [address] [place] [id number] [phone]           Bring a current list of meds and any records pertaining to this visit. For Physicals, please bring immunization records and any forms needing to be filled out. Please arrive 10 minutes early to complete paperwork.              Who to contact     If you have questions  "or need follow up information about today's clinic visit or your schedule please contact [facility] directly at [phone].  Normal or non-critical lab and imaging results will be communicated to you by Shidonnihart, letter or phone within 4 business days after the clinic has received the results. If you do not hear from us within 7 days, please contact the clinic through JobSynct or phone. If you have a critical or abnormal lab result, we will notify you by phone as soon as possible.  Submit refill requests through MySalescamp or call your pharmacy and they will forward the refill request to us. Please allow 3 business days for your refill to be completed.          Additional Information About Your Visit        ShidonniharQuizens Information     My[employer]camp gives you secure access to your electronic health record. If you see a primary care provider, you can also send messages to your care team and make appointments. If you have questions, please call your primary care clinic.  If you do not have a primary care provider, please call [phone] and they will assist you.        Care EveryWhere ID     This is your Care EveryWhere ID. This could be used by other organizations to access your [place] medical records  [id number]        Your Vitals Were     Pulse Temperature Respirations Height Last Period Pulse Oximetry    79 99  F (37.2  C) (Oral) 18 1.664 m (5' 5.5\") 05/13/2018 (Approximate) 100%    Breastfeeding? BMI (Body Mass Index)                No 49.33 kg/m2           Blood Pressure from Last 3 Encounters:   06/12/18 (!) 178/102   05/14/18 (!) 162/98   12/20/17 (!) 142/100    Weight from Last 3 Encounters:   06/12/18 136.5 kg (301 lb)   05/14/18 134.7 kg (297 lb)   12/20/17 (!) 137 kg (302 lb)              We Performed the Following     Chlamydia trachomatis PCR     Comprehensive metabolic panel     Hepatitis B Surface Antibody     Hepatitis B surface antigen     Hepatitis C antibody     HIV Screening     HPV High " Risk Types DNA Cervical     Lipid panel reflex to direct LDL Fasting     Neisseria gonorrhoeae PCR     NUTRITION REFERRAL     Pap imaged thin layer screen with HPV - recommended age 30 - 65 years (select HPV order below)     Rapid strep screen     Wet prep          Today's Medication Changes          These changes are accurate as of 6/12/18  3:42 PM.  If you have any questions, ask your nurse or doctor.               Start taking these medicines.        Dose/Directions    amoxicillin 875 MG tablet   Commonly known as:  AMOXIL   Used for:  Streptococcal pharyngitis   Started by:  Milana Amaro PA-C        Dose:  875 mg   Take 1 tablet (875 mg) by mouth 2 times daily   Quantity:  20 tablet   Refills:  0       metroNIDAZOLE 500 MG tablet   Commonly known as:  FLAGYL   Used for:  Bacterial vaginosis   Started by:  Milana Amaro PA-C        Dose:  500 mg   Take 1 tablet (500 mg) by mouth 2 times daily   Quantity:  14 tablet   Refills:  0            Where to get your medicines      These medications were sent to [facility]/pharmacy #[id number] - [facility] [address] [place] [address], [place] AT [address] [address]     Phone:  [phone]     amLODIPine 10 MG tablet    amoxicillin 875 MG tablet    lisinopril-hydrochlorothiazide 20-25 MG per tablet    metroNIDAZOLE 500 MG tablet                Primary Care Provider Office Phone # Fax #    Barbara Esquivel [phone] [address] [facility] [address]        Equal Access to Services     [facility] AH: Hadii juni santana hadasho Soestrellitaali, waaxda luqadaha, qaybta kaalmada adeegyada, shad bess. So [facility] [phone].    ATENCIÓN: Si habla español, tiene a harris disposición servicios gratuitos de asistencia lingüística. Llame al [phone].    We comply with applicable federal civil rights laws and [place] laws. We do not discriminate on the basis of race,  color, national origin, age, disability, sex, sexual orientation, or gender identity.            Thank you!     Thank you for choosing [facility]  for your care. Our goal is always to provide you with excellent care. Hearing back from our patients is one way we can continue to improve our services. Please take a few minutes to complete the written survey that you may receive in the mail after your visit with us. Thank you!             Your Updated Medication List - Protect others around you: Learn how to safely use, store and throw away your medicines at www.disposemymeds.org.          This list is accurate as of 6/12/18  3:42 PM.  Always use your most recent med list.                   Brand Name Dispense Instructions for use Diagnosis    amLODIPine 10 MG tablet    NORVASC    30 tablet    Take 1 tablet (10 mg) by mouth daily    Hypertension goal BP (blood pressure) < 140/90       amoxicillin 875 MG tablet    AMOXIL    20 tablet    Take 1 tablet (875 mg) by mouth 2 times daily    Streptococcal pharyngitis       lisinopril-hydrochlorothiazide 20-25 MG per tablet    PRINZIDE/ZESTORETIC    30 tablet    Take 1 tablet by mouth daily    Hypertension goal BP (blood pressure) < 140/90       metroNIDAZOLE 500 MG tablet    FLAGYL    14 tablet    Take 1 tablet (500 mg) by mouth 2 times daily    Bacterial vaginosis       mupirocin 2 % cream    BACTROBAN    30 g    Apply topically 3 times daily    Abscess       oxyCODONE-acetaminophen 7.5-325 MG per tablet    PERCOCET    40 tablet    Take 1 tablet by mouth 3 times daily as needed for pain    Boil       triamcinolone 0.1 % cream    KENALOG    30 g    Apply topically 3 times daily as needed for irritation    Abscess

## 2018-06-13 LAB
C TRACH DNA SPEC QL NAA+PROBE: NEGATIVE
HBV SURFACE AB SERPL IA-ACNC: 140.93 M[IU]/ML
HBV SURFACE AG SERPL QL IA: NONREACTIVE
HCV AB SERPL QL IA: NONREACTIVE
HIV 1+2 AB+HIV1 P24 AG SERPL QL IA: NONREACTIVE
N GONORRHOEA DNA SPEC QL NAA+PROBE: NEGATIVE
SPECIMEN SOURCE: NORMAL

## 2018-06-13 RX ORDER — NICOTINE 21 MG/24HR
1 PATCH, TRANSDERMAL 24 HOURS TRANSDERMAL EVERY 24 HOURS
Qty: 30 PATCH | Refills: 3 | Status: SHIPPED | OUTPATIENT
Start: 2018-06-13 | End: 2019-03-19

## 2018-06-13 RX ORDER — OXYCODONE AND ACETAMINOPHEN 7.5; 325 MG/1; MG/1
1 TABLET ORAL 3 TIMES DAILY PRN
Qty: 20 TABLET | Refills: 0 | Status: SHIPPED | OUTPATIENT
Start: 2018-06-13 | End: 2018-07-02

## 2018-06-13 NOTE — PROGRESS NOTES
Marjan Benito  Your HIV tests, hepatitis tests, electrolytes, blood sugar, kidney function and liver function were normal.   You have been immunized against hepatitis B and considered immune.   Your cholesterol was normal.   Your gonorrhea and chlamydia tests are not back yet.   Please call or MyChart my office with any questions or concerns.    Milana Amaro, PAC

## 2018-06-13 NOTE — TELEPHONE ENCOUNTER
Routing refill request to provider for review/approval because:  Drug not on the [facility] refill protocol   Laury Edgar RN

## 2018-06-13 NOTE — TELEPHONE ENCOUNTER
Message from MyChart:  Original authorizing provider: MD Yola Thomasnelli SANDRA Perry would like a refill of the following medications:  oxyCODONE-acetaminophen (PERCOCET) 7.5-325 MG per tablet [Barbara Esquivel MD]    Preferred pharmacy: [facility] [address] [place] [address] - [address] [facility]    Comment:  I m having a lot of pain having cramp pain bad and my breast having very bad pain

## 2018-06-13 NOTE — PROGRESS NOTES
Marjan Benito  Your gonorrhea and chlamydia tests were negative.   Please call or MyChart my office with any questions or concerns.    Milana Amaro, PAC

## 2018-06-14 LAB
COPATH REPORT: NORMAL
PAP: NORMAL

## 2018-06-18 LAB
FINAL DIAGNOSIS: NORMAL
HPV HR 12 DNA CVX QL NAA+PROBE: NEGATIVE
HPV16 DNA SPEC QL NAA+PROBE: NEGATIVE
HPV18 DNA SPEC QL NAA+PROBE: NEGATIVE
SPECIMEN DESCRIPTION: NORMAL
SPECIMEN SOURCE CVX/VAG CYTO: NORMAL

## 2018-06-21 ENCOUNTER — MYC REFILL (OUTPATIENT)
Dept: FAMILY MEDICINE | Facility: CLINIC | Age: 28
End: 2018-06-21

## 2018-06-21 DIAGNOSIS — N64.4 BREAST PAIN: ICD-10-CM

## 2018-06-21 RX ORDER — OXYCODONE AND ACETAMINOPHEN 7.5; 325 MG/1; MG/1
1 TABLET ORAL 3 TIMES DAILY PRN
Qty: 20 TABLET | Refills: 0 | OUTPATIENT
Start: 2018-06-21

## 2018-06-21 NOTE — TELEPHONE ENCOUNTER
Message from MyChart:  Original authorizing provider: MD Yola Thomasnelli SANDRA Perry would like a refill of the following medications:  oxyCODONE-acetaminophen (PERCOCET) 7.5-325 MG per tablet [Barbara Esquivel MD]    Preferred pharmacy: [facility] [address] [place], [place] - [address] [facility]    Comment:  The 20 pills I gave me wasn t enough I m still in pain I can come in if u need me to call my cell phone it s my breast pain is bad

## 2018-06-21 NOTE — TELEPHONE ENCOUNTER
Requested Prescriptions   Pending Prescriptions Disp Refills     oxyCODONE-acetaminophen (PERCOCET) 7.5-325 MG per tablet 20 tablet 0     Sig: Take 1 tablet by mouth 3 times daily as needed for pain    There is no refill protocol information for this order        oxyCODONE-acetaminophen (PERCOCET) 7.5-325 MG per tablet      Last Written Prescription Date:  6/13/18  Last Fill Quantity: 20,   # refills: 0  Last Office Visit: 6/12/18  Future Office visit:    Next 5 appointments (look out 90 days)     Jul 10, 2018 10:20 AM CDT   Office Visit with Milana Amaro PA-C   [facility]    [address] [place] [id number] [phone]                   Routing refill request to provider for review/approval because:  Drug not on the [facility], [facility] or [facility] refill protocol or controlled substance

## 2018-06-21 NOTE — TELEPHONE ENCOUNTER
Routing refill request to provider for review/approval because:  Drug not on the [facility] refill protocol   Last Rx 6/13/18  Yesenia Braun RN

## 2018-06-22 NOTE — TELEPHONE ENCOUNTER
Refill denied - as I discussed with patient, I do not plan to take over as her chronic pain provider

## 2018-06-23 ENCOUNTER — MYC REFILL (OUTPATIENT)
Dept: FAMILY MEDICINE | Facility: CLINIC | Age: 28
End: 2018-06-23

## 2018-06-23 DIAGNOSIS — N64.4 BREAST PAIN: ICD-10-CM

## 2018-06-23 RX ORDER — OXYCODONE AND ACETAMINOPHEN 7.5; 325 MG/1; MG/1
1 TABLET ORAL 3 TIMES DAILY PRN
Qty: 20 TABLET | Refills: 0 | Status: CANCELLED | OUTPATIENT
Start: 2018-06-23

## 2018-06-25 NOTE — TELEPHONE ENCOUNTER
Requested Prescriptions   Pending Prescriptions Disp Refills     oxyCODONE-acetaminophen (PERCOCET) 7.5-325 MG per tablet  Last Written Prescription Date:  6/13/18  Last Fill Quantity: 20 tablet,  # refills: 0   Last office visit: 6/12/2018 with prescribing provider:  Dr. Esquivel  Future Office Visit:   Next 5 appointments (look out 90 days)     Jul 10, 2018 10:20 AM CDT   Office Visit with Milana Amaro PA-C   [facility] ([facility])    [address] [place] [id number] [phone]         Routing refill request to provider for review/approval because:  Drug not on the [facility], [facility] or  Health refill protocol or controlled substance        20 tablet 0     Sig: Take 1 tablet by mouth 3 times daily as needed for pain    There is no refill protocol information for this order

## 2018-06-25 NOTE — TELEPHONE ENCOUNTER
Message from MyChart:  Original authorizing provider: MD Yola Thomasnelli SANDRA Perry would like a refill of the following medications:  oxyCODONE-acetaminophen (PERCOCET) 7.5-325 MG per tablet [Barbara Esquivel MD]    Preferred pharmacy: [facility] [address] [place], [place] - [address] [facility]    Comment:  I m really having bad breast pain and now I have a boil in the same place I told it was last tume

## 2018-06-25 NOTE — TELEPHONE ENCOUNTER
Refill denied - needs to be seen for this type of medication.  I see she has appointment in a couple weeks, but would suggest urgent care, same day,  or another provider

## 2018-06-26 ENCOUNTER — TELEPHONE (OUTPATIENT)
Dept: FAMILY MEDICINE | Facility: CLINIC | Age: 28
End: 2018-06-26

## 2018-06-26 NOTE — TELEPHONE ENCOUNTER
..Reason for Call:  appointment    Detailed comments: Patient would like to know why she richard to be seen in the next 2 weeks.    Phone Number Patient can be reached at: Home number on file [phone] (home)    Best Time: anytime    Can we leave a detailed message on this number? YES    Call taken on 6/26/2018 at 10:18 AM by Jacqueline Hope

## 2018-06-26 NOTE — TELEPHONE ENCOUNTER
This writer attempted to contact pt on 06/26/18      Reason for call schedule appt and left message.      If patient calls back:   Schedule Office Visit appointment within 2 weeks with primary care, document that pt called and close encounter         Kiki Solitario MA

## 2018-07-02 ENCOUNTER — OFFICE VISIT (OUTPATIENT)
Dept: FAMILY MEDICINE | Facility: CLINIC | Age: 28
End: 2018-07-02
Payer: COMMERCIAL

## 2018-07-02 VITALS
RESPIRATION RATE: 20 BRPM | HEIGHT: 66 IN | HEART RATE: 89 BPM | BODY MASS INDEX: 47.09 KG/M2 | OXYGEN SATURATION: 100 % | SYSTOLIC BLOOD PRESSURE: 144 MMHG | WEIGHT: 293 LBS | TEMPERATURE: 98.8 F | DIASTOLIC BLOOD PRESSURE: 92 MMHG

## 2018-07-02 DIAGNOSIS — R82.5 POSITIVE URINE DRUG SCREEN: ICD-10-CM

## 2018-07-02 DIAGNOSIS — I10 HYPERTENSION GOAL BP (BLOOD PRESSURE) < 140/90: Primary | ICD-10-CM

## 2018-07-02 DIAGNOSIS — E66.01 MORBID OBESITY WITH BMI OF 45.0-49.9, ADULT (H): ICD-10-CM

## 2018-07-02 DIAGNOSIS — L02.91 ABSCESS: ICD-10-CM

## 2018-07-02 DIAGNOSIS — Z72.0 TOBACCO ABUSE: ICD-10-CM

## 2018-07-02 DIAGNOSIS — N64.4 BREAST PAIN: ICD-10-CM

## 2018-07-02 DIAGNOSIS — M54.9 BACK PAIN, UNSPECIFIED BACK LOCATION, UNSPECIFIED BACK PAIN LATERALITY, UNSPECIFIED CHRONICITY: ICD-10-CM

## 2018-07-02 PROCEDURE — 99214 OFFICE O/P EST MOD 30 MIN: CPT | Performed by: NURSE PRACTITIONER

## 2018-07-02 PROCEDURE — 99000 SPECIMEN HANDLING OFFICE-LAB: CPT | Performed by: NURSE PRACTITIONER

## 2018-07-02 PROCEDURE — 80307 DRUG TEST PRSMV CHEM ANLYZR: CPT | Mod: 90 | Performed by: NURSE PRACTITIONER

## 2018-07-02 RX ORDER — MUPIROCIN CALCIUM 20 MG/G
CREAM TOPICAL 3 TIMES DAILY
Qty: 30 G | Refills: 1 | Status: SHIPPED | OUTPATIENT
Start: 2018-07-02 | End: 2018-07-09

## 2018-07-02 RX ORDER — OXYCODONE AND ACETAMINOPHEN 7.5; 325 MG/1; MG/1
1 TABLET ORAL DAILY PRN
Qty: 20 TABLET | Refills: 0 | Status: SHIPPED | OUTPATIENT
Start: 2018-07-02 | End: 2018-08-03

## 2018-07-02 RX ORDER — LISINOPRIL AND HYDROCHLOROTHIAZIDE 12.5; 2 MG/1; MG/1
2 TABLET ORAL DAILY
Qty: 60 TABLET | Refills: 0 | Status: SHIPPED | OUTPATIENT
Start: 2018-07-02 | End: 2018-08-28

## 2018-07-02 ASSESSMENT — PAIN SCALES - GENERAL: PAINLEVEL: WORST PAIN (10)

## 2018-07-02 NOTE — MR AVS SNAPSHOT
"              After Visit Summary   7/2/2018    Jigna Amador    MRN: [id number]           Patient Information     Date Of Birth          1990        Visit Information        Provider Department      7/2/2018 12:20 PM Kerry Sanabria NP [facility]        Today's Diagnoses     Hypertension goal BP (blood pressure) < 140/90    -  1    Breast pain        Tobacco abuse        Abscess        Morbid obesity with BMI of 45.0-49.9, adult (H)        Back pain, unspecified back location, unspecified back pain laterality, unspecified chronicity          Care Instructions    Return in 1 month to check blood pressure  Work on diet/exercise              Follow-ups after your visit        Additional Services     [facility]       Please call [phone] to make an appointment. Clinic is located: [facility] [address] [place] [address] [id number] 4th [place] [address]      Please complete the following questions:    Procedure/Referral: Referral Only -  Comprehensive Evaluation and Management  Breast and back pain    What is your diagnosis for the patient's pain? Breast and back pain    What are your specific questions for the pain specialist? Breast/back pain    Are there any red flags that may impact the assessment or management of the patient? None that provider is aware of at this time. But percocet is \"the only thing that helps\" her pain. Recommend work on other methods of pain management                  Your next 10 appointments already scheduled     Jul 10, 2018 10:20 AM CDT   Office Visit with Milana Amaro PA-C   [facility]    [address] [place] [id number] [phone]           Bring a current list of meds and any records pertaining to this visit. For Physicals, please bring immunization records and any forms needing to be filled out. Please arrive 10 minutes early to complete paperwork.    " "        Aug 03, 2018 11:20 AM CDT   Office Visit with Barbara Esquivel MD   [facility] ([facility]    [address] [place] [id number] [phone]           Bring a current list of meds and any records pertaining to this visit. For Physicals, please bring immunization records and any forms needing to be filled out. Please arrive 10 minutes early to complete paperwork.              Who to contact     If you have questions or need follow up information about today's clinic visit or your schedule please contact [facility] directly at [phone].  Normal or non-critical lab and imaging results will be communicated to you by MyChart, letter or phone within 4 business days after the clinic has received the results. If you do not hear from us within 7 days, please contact the clinic through [employer] or phone. If you have a critical or abnormal lab result, we will notify you by phone as soon as possible.  Submit refill requests through [employer] or call your pharmacy and they will forward the refill request to us. Please allow 3 business days for your refill to be completed.          Additional Information About Your Visit        [employer] Information     [employer] gives you secure access to your electronic health record. If you see a primary care provider, you can also send messages to your care team and make appointments. If you have questions, please call your primary care clinic.  If you do not have a primary care provider, please call [phone] and they will assist you.        Care EveryWhere ID     This is your Care EveryWhere ID. This could be used by other organizations to access your [facility] medical records  [id number]        Your Vitals Were     Pulse Temperature Respirations Height Last Period Pulse Oximetry    89 98.8  F (37.1  C) (Oral) 20 1.664 m (5' 5.5\") 06/16/2018 100%    BMI (Body Mass Index)                   49.82 kg/m2      "       Blood Pressure from Last 3 Encounters:   07/02/18 (!) 144/92   06/12/18 (!) 178/102   05/14/18 (!) 162/98    Weight from Last 3 Encounters:   07/02/18 137.9 kg (304 lb)   06/12/18 136.5 kg (301 lb)   05/14/18 134.7 kg (297 lb)              We Performed the Following     Comprehensive Drug Analysis, Urine     [facility]          Today's Medication Changes          These changes are accurate as of 7/2/18  1:04 PM.  If you have any questions, ask your nurse or doctor.               Start taking these medicines.        Dose/Directions    lisinopril-hydrochlorothiazide 20-12.5 MG per tablet   Commonly known as:  PRINZIDE/ZESTORETIC   Used for:  Hypertension goal BP (blood pressure) < 140/90   Replaces:  lisinopril-hydrochlorothiazide 20-25 MG per tablet   Started by:  Kerry Sanabria NP        Dose:  2 tablet   Take 2 tablets by mouth daily   Quantity:  60 tablet   Refills:  0         These medicines have changed or have updated prescriptions.        Dose/Directions    oxyCODONE-acetaminophen 7.5-325 MG per tablet   Commonly known as:  PERCOCET   This may have changed:  when to take this   Used for:  Breast pain   Changed by:  Kerry Sanabria NP        Dose:  1 tablet   Take 1 tablet by mouth daily as needed for pain   Quantity:  20 tablet   Refills:  0         Stop taking these medicines if you haven't already. Please contact your care team if you have questions.     lisinopril-hydrochlorothiazide 20-25 MG per tablet   Commonly known as:  PRINZIDE/ZESTORETIC   Replaced by:  lisinopril-hydrochlorothiazide 20-12.5 MG per tablet   Stopped by:  Kerry Sanabria NP                Where to get your medicines      These medications were sent to [facility]/pharm[facility] #[id number] - [place], [place] - [address], [place] AT [address] [place], [facility] [address]     Phone:  [phone]     lisinopril-hydrochlorothiazide 20-12.5 MG per tablet    mupirocin 2 % cream         Some of  these will need a paper prescription and others can be bought over the counter.  Ask your nurse if you have questions.     Bring a paper prescription for each of these medications     oxyCODONE-acetaminophen 7.5-325 MG per tablet               Information about OPIOIDS     PRESCRIPTION OPIOIDS: WHAT YOU NEED TO KNOW   We gave you an opioid (narcotic) pain medicine. It is important to manage your pain, but opioids are not always the best choice. You should first try all the other options your care team gave you. Take this medicine for as short a time (and as few doses) as possible.     These medicines have risks:    DO NOT drive when on new or higher doses of pain medicine. These medicines can affect your alertness and reaction times, and you could be arrested for driving under the influence (DUI). If you need to use opioids long-term, talk to your care team about driving.    DO NOT operate heave machinery    DO NOT do any other dangerous activities while taking these medicines.     DO NOT drink any alcohol while taking these medicines.      If the opioid prescribed includes acetaminophen, DO NOT take with any other medicines that contain acetaminophen. Read all labels carefully. Look for the word  acetaminophen  or  Tylenol.  Ask your pharmacist if you have questions or are unsure.    You can get addicted to pain medicines, especially if you have a history of addiction (chemical, alcohol or substance dependence). Talk to your care team about ways to reduce this risk.    Store your pills in a secure place, locked if possible. We will not replace any lost or stolen medicine. If you don t finish your medicine, please throw away (dispose) as directed by your pharmacist. The [place] Pollution Control Agency has more information about safe disposal: https://www.pca.[facility].[place].us/living-green/managing-unwanted-medications.     All opioids tend to cause constipation. Drink plenty of water and eat foods that have a lot of  fiber, such as fruits, vegetables, prune juice, apple juice and high-fiber cereal. Take a laxative (Miralax, milk of magnesia, Colace, Senna) if you don t move your bowels at least every other day.          Primary Care Provider Office Phone # Fax #    Barbara Jose Esquivel [phone] [address] [facility] [address]        Equal Access to Services     NURY URIARTE : Hadii aad ku hadasho Soomaali, waaxda luqadaha, qaybta kaalmada adeegyada, waxay idiin hayaan adeeg kharash lakee . So [facility] [phone].    ATENCIÓN: Si anna harding, tiene a harris disposición servicios gratuitos de asistencia lingüística. Llame al [phone].    We comply with applicable federal civil rights laws and [place] laws. We do not discriminate on the basis of race, color, national origin, age, disability, sex, sexual orientation, or gender identity.            Thank you!     Thank you for choosing [facility]  for your care. Our goal is always to provide you with excellent care. Hearing back from our patients is one way we can continue to improve our services. Please take a few minutes to complete the written survey that you may receive in the mail after your visit with us. Thank you!             Your Updated Medication List - Protect others around you: Learn how to safely use, store and throw away your medicines at www.disposemymeds.org.          This list is accurate as of 7/2/18  1:04 PM.  Always use your most recent med list.                   Brand Name Dispense Instructions for use Diagnosis    amLODIPine 10 MG tablet    NORVASC    30 tablet    Take 1 tablet (10 mg) by mouth daily    Hypertension goal BP (blood pressure) < 140/90       lisinopril-hydrochlorothiazide 20-12.5 MG per tablet    PRINZIDE/ZESTORETIC    60 tablet    Take 2 tablets by mouth daily    Hypertension goal BP (blood pressure) < 140/90       mupirocin 2 % cream    BACTROBAN    30 g    Apply topically 3 times  daily for 7 days    Abscess       nicotine 14 MG/24HR 24 hr patch    NICODERM CQ    30 patch    Place 1 patch onto the skin every 24 hours    Tobacco abuse       oxyCODONE-acetaminophen 7.5-325 MG per tablet    PERCOCET    20 tablet    Take 1 tablet by mouth daily as needed for pain    Breast pain       triamcinolone 0.1 % cream    KENALOG    30 g    Apply topically 3 times daily as needed for irritation    Abscess
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
## 2018-07-06 PROBLEM — R82.5 POSITIVE URINE DRUG SCREEN: Status: ACTIVE | Noted: 2018-07-06

## 2018-07-06 LAB — COMPREHEN DRUG ANALYSIS UR: NORMAL

## 2018-08-03 ENCOUNTER — OFFICE VISIT (OUTPATIENT)
Dept: FAMILY MEDICINE | Facility: CLINIC | Age: 28
End: 2018-08-03
Payer: COMMERCIAL

## 2018-08-03 VITALS
OXYGEN SATURATION: 100 % | HEIGHT: 66 IN | SYSTOLIC BLOOD PRESSURE: 142 MMHG | RESPIRATION RATE: 18 BRPM | WEIGHT: 293 LBS | TEMPERATURE: 98.4 F | BODY MASS INDEX: 47.09 KG/M2 | HEART RATE: 74 BPM | DIASTOLIC BLOOD PRESSURE: 100 MMHG

## 2018-08-03 DIAGNOSIS — N62 LARGE BREASTS: ICD-10-CM

## 2018-08-03 DIAGNOSIS — G89.4 CHRONIC PAIN SYNDROME: ICD-10-CM

## 2018-08-03 DIAGNOSIS — L02.91 ABSCESS: ICD-10-CM

## 2018-08-03 DIAGNOSIS — N94.6 DYSMENORRHEA: Primary | ICD-10-CM

## 2018-08-03 DIAGNOSIS — Z72.0 TOBACCO ABUSE: ICD-10-CM

## 2018-08-03 PROCEDURE — 99214 OFFICE O/P EST MOD 30 MIN: CPT | Performed by: FAMILY MEDICINE

## 2018-08-03 RX ORDER — TIZANIDINE 2 MG/1
2 TABLET ORAL 3 TIMES DAILY PRN
Qty: 30 TABLET | Refills: 0 | Status: SHIPPED | OUTPATIENT
Start: 2018-08-03 | End: 2019-05-13

## 2018-08-03 RX ORDER — OXYCODONE AND ACETAMINOPHEN 7.5; 325 MG/1; MG/1
1 TABLET ORAL 2 TIMES DAILY PRN
Qty: 30 TABLET | Refills: 0 | Status: SHIPPED | OUTPATIENT
Start: 2018-08-03 | End: 2019-01-14

## 2018-08-03 RX ORDER — DOXYCYCLINE 100 MG/1
100 CAPSULE ORAL 2 TIMES DAILY
Qty: 14 CAPSULE | Refills: 0 | Status: SHIPPED | OUTPATIENT
Start: 2018-08-03 | End: 2019-03-19

## 2018-08-03 RX ORDER — TRIAMCINOLONE ACETONIDE 1 MG/G
CREAM TOPICAL 3 TIMES DAILY PRN
Qty: 30 G | Refills: 0 | Status: SHIPPED | OUTPATIENT
Start: 2018-08-03 | End: 2018-08-30

## 2018-08-03 NOTE — MR AVS SNAPSHOT
After Visit Summary   8/3/2018    Jigna Amador    MRN: [id number]           Patient Information     Date Of Birth          1990        Visit Information        Provider Department      8/3/2018 11:20 AM Barbara Esquivel MD [facility]        Today's Diagnoses     Dysmenorrhea    -  1    Abscess        Large breasts        Tobacco abuse        Chronic pain syndrome           Follow-ups after your visit        Follow-up notes from your care team     Return if symptoms worsen or fail to improve, for Contact me with progress.      Who to contact     If you have questions or need follow up information about today's clinic visit or your schedule please contact [facility] directly at [phone].  Normal or non-critical lab and imaging results will be communicated to you by MyChart, letter or phone within 4 business days after the clinic has received the results. If you do not hear from us within 7 days, please contact the clinic through MyChart or phone. If you have a critical or abnormal lab result, we will notify you by phone as soon as possible.  Submit refill requests through [employer] or call your pharmacy and they will forward the refill request to us. Please allow 3 business days for your refill to be completed.          Additional Information About Your Visit        MyChart Information     [employer] gives you secure access to your electronic health record. If you see a primary care provider, you can also send messages to your care team and make appointments. If you have questions, please call your primary care clinic.  If you do not have a primary care provider, please call [phone] and they will assist you.        Care EveryWhere ID     This is your Care EveryWhere ID. This could be used by other organizations to access your [facility] medical records  [id number]        Your Vitals Were     Pulse Temperature Respirations Height Last Period Pulse Oximetry     "74 98.4  F (36.9  C) (Oral) 18 1.664 m (5' 5.5\") 07/01/2018 (Approximate) 100%    BMI (Body Mass Index)                   49.82 kg/m2            Blood Pressure from Last 3 Encounters:   08/03/18 (!) 142/100   07/02/18 (!) 144/92   06/12/18 (!) 178/102    Weight from Last 3 Encounters:   08/03/18 137.9 kg (304 lb)   07/02/18 137.9 kg (304 lb)   06/12/18 136.5 kg (301 lb)              Today, you had the following     No orders found for display         Today's Medication Changes          These changes are accurate as of 8/3/18  3:08 PM.  If you have any questions, ask your nurse or doctor.               Start taking these medicines.        Dose/Directions    doxycycline 100 MG capsule   Commonly known as:  VIBRAMYCIN   Used for:  Abscess   Started by:  Barbara Esquivel MD        Dose:  100 mg   Take 1 capsule (100 mg) by mouth 2 times daily   Quantity:  14 capsule   Refills:  0       tiZANidine 2 MG tablet   Commonly known as:  ZANAFLEX   Used for:  Dysmenorrhea   Started by:  Barbara Esquivel MD        Dose:  2 mg   Take 1 tablet (2 mg) by mouth 3 times daily as needed (cramps)   Quantity:  30 tablet   Refills:  0         These medicines have changed or have updated prescriptions.        Dose/Directions    oxyCODONE-acetaminophen 7.5-325 MG per tablet   Commonly known as:  PERCOCET   This may have changed:  when to take this   Used for:  Dysmenorrhea   Changed by:  Barbara Esquivel MD        Dose:  1 tablet   Take 1 tablet by mouth 2 times daily as needed for pain   Quantity:  30 tablet   Refills:  0            Where to get your medicines      These medications were sent to [facility] [address] [facility], [place] - [address] [facility] [address]  [address] [facility] [address]     Phone:  [phone]     doxycycline 100 MG capsule    triamcinolone 0.1 % cream         Some of these will need a paper prescription and others can be bought over the " counter.  Ask your nurse if you have questions.     Bring a paper prescription for each of these medications     oxyCODONE-acetaminophen 7.5-325 MG per tablet    tiZANidine 2 MG tablet               Information about OPIOIDS     PRESCRIPTION OPIOIDS: WHAT YOU NEED TO KNOW   We gave you an opioid (narcotic) pain medicine. It is important to manage your pain, but opioids are not always the best choice. You should first try all the other options your care team gave you. Take this medicine for as short a time (and as few doses) as possible.     These medicines have risks:    DO NOT drive when on new or higher doses of pain medicine. These medicines can affect your alertness and reaction times, and you could be arrested for driving under the influence (DUI). If you need to use opioids long-term, talk to your care team about driving.    DO NOT operate heave machinery    DO NOT do any other dangerous activities while taking these medicines.     DO NOT drink any alcohol while taking these medicines.      If the opioid prescribed includes acetaminophen, DO NOT take with any other medicines that contain acetaminophen. Read all labels carefully. Look for the word  acetaminophen  or  Tylenol.  Ask your pharmacist if you have questions or are unsure.    You can get addicted to pain medicines, especially if you have a history of addiction (chemical, alcohol or substance dependence). Talk to your care team about ways to reduce this risk.    Store your pills in a secure place, locked if possible. We will not replace any lost or stolen medicine. If you don t finish your medicine, please throw away (dispose) as directed by your pharmacist. The [place] Pollution Control Agency has more information about safe disposal: https://www.pca.[facility].[place].us/living-green/managing-unwanted-medications.     All opioids tend to cause constipation. Drink plenty of water and eat foods that have a lot of fiber, such as fruits, vegetables, prune juice,  apple juice and high-fiber cereal. Take a laxative (Miralax, milk of magnesia, Colace, Senna) if you don t move your bowels at least every other day.          Primary Care Provider Office Phone # Fax #    Barbara Jose Esquivel [phone] [address] [facility] [address]        Equal Access to Services     [facility] : Hadii aad ku hadasho Soomaali, waaxda luqadaha, qaybta kaalmada adeegyada, waxay idiin hayaan adeeg kharash la'aan . So [facility] [phone].    ATENCIÓN: Si habla español, tiene a harris disposición servicios gratuitos de asistencia lingüística. Llame al [phone].    We comply with applicable federal civil rights laws and [place] laws. We do not discriminate on the basis of race, color, national origin, age, disability, sex, sexual orientation, or gender identity.            Thank you!     Thank you for choosing [facility]  for your care. Our goal is always to provide you with excellent care. Hearing back from our patients is one way we can continue to improve our services. Please take a few minutes to complete the written survey that you may receive in the mail after your visit with us. Thank you!             Your Updated Medication List - Protect others around you: Learn how to safely use, store and throw away your medicines at www.disposemymeds.org.          This list is accurate as of 8/3/18  3:08 PM.  Always use your most recent med list.                   Brand Name Dispense Instructions for use Diagnosis    amLODIPine 10 MG tablet    NORVASC    30 tablet    Take 1 tablet (10 mg) by mouth daily    Hypertension goal BP (blood pressure) < 140/90       doxycycline 100 MG capsule    VIBRAMYCIN    14 capsule    Take 1 capsule (100 mg) by mouth 2 times daily    Abscess       lisinopril-hydrochlorothiazide 20-12.5 MG per tablet    PRINZIDE/ZESTORETIC    60 tablet    Take 2 tablets by mouth daily    Hypertension goal BP (blood pressure) < 140/90        nicotine 14 MG/24HR 24 hr patch    NICODERM CQ    30 patch    Place 1 patch onto the skin every 24 hours    Tobacco abuse       oxyCODONE-acetaminophen 7.5-325 MG per tablet    PERCOCET    30 tablet    Take 1 tablet by mouth 2 times daily as needed for pain    Dysmenorrhea       tiZANidine 2 MG tablet    ZANAFLEX    30 tablet    Take 1 tablet (2 mg) by mouth 3 times daily as needed (cramps)    Dysmenorrhea       triamcinolone 0.1 % cream    KENALOG    30 g    Apply topically 3 times daily as needed for irritation    Abscess

## 2018-08-03 NOTE — PROGRESS NOTES
SUBJECTIVE:   Jigna Amador is a 28 year old female who presents to clinic today for the following health issues:      Back Pain       Duration: A couple days ago         Specific cause: none    Description:   Location of pain: low back bilateral and middle of back bilateral  Character of pain: sharp  Pain radiation:none  New numbness or weakness in legs, not attributed to pain:  no     Intensity: Currently 10/10    History:   Pain interferes with job: No  History of back problems: no prior back problems, but could be due to breasts  Any previous MRI or X-rays: None  Sees a specialist for back pain:  No  Therapies tried without relief: cold and heat    Alleviating factors:   Improved by: Nothing       Precipitating factors:  Worsened by: Bending    Functional and Psychosocial Screen (Sharlene STarT Back):            Concern - Breast pain   Onset: ongoing     Description:   Pain in both breasts - sores.     Intensity: Currently 7/10    Progression of Symptoms:  worsening    Accompanying Signs & Symptoms:  Sores     Therapies Tried and outcome: Ibuprofen, does not help.     Concern - Boil   Onset: Noticed it there for 2 days now    Description:   Boil that is painful located inside the vaginal wall (not the labia)    Intensity: Currently 10+/10    Progression of Symptoms:  worsening    Accompanying Signs & Symptoms:  No    Previous history of similar problem:   Yes     Therapies Tried and outcome: Treated with meds    SUBJECTIVE:  Here today in follow-up of pain.  I first met the patient about 6 months ago and she had been seeing an outside provider for ongoing issues of breast and back pain.  I discussed with her that I am not taking on any new chronic pain patients and I do not think she is appropriate for this.  But intermittently she has requested refills of medication.  Has met with a plastic surgeon to discuss the possibility of breast reduction as this causes her quite a bit of pain in her upper back.  He requires  "that she stop smoking first and at this point she has been unable to do so.  Complains today of significant dysmenorrhea.  Her cycles are somewhat irregular with occasional irregularity, but her cramps and bleeding are quite heavy.  She says she is not interested in birth control pills as in the past they have always made her gain weight.  She also has developed an abscess near the entrance of her vagina.  This is been an intermittent issue in the past and she has done well with hot soaks and oral antibiotics.  No fevers or chills or systemic symptoms.    Review of systems otherwise negative.  Past medical, family, and social history reviewed and updated in chart.    OBJECTIVE:  BP (!) 142/100 (BP Location: Right arm, Patient Position: Sitting, Cuff Size: Adult Large)  Pulse 74  Temp 98.4  F (36.9  C) (Oral)  Resp 18  Ht 1.664 m (5' 5.5\")  Wt 137.9 kg (304 lb)  LMP 07/01/2018 (Approximate)  SpO2 100%  BMI 49.82 kg/m2  Alert, pleasant, upbeat, and in no apparent discomfort.  Morbidly obese  S1 and S2 normal, no murmurs, clicks, gallops or rubs. Regular rate and rhythm. Chest is clear; no wheezes or rales. No edema or JVD.   Past labs reviewed with the patient.     ASSESSMENT / PLAN:  (N94.6) Dysmenorrhea  (primary encounter diagnosis)  Comment: Discussed mechanism of action of the proposed medication, as well as potential effects, both good and bad.  Patient expressed understanding and agreed with treatment.   Plan: oxyCODONE-acetaminophen (PERCOCET) 7.5-325 MG         per tablet, tiZANidine (ZANAFLEX) 2 MG tablet            (L02.91) Abscess  Comment: Discussed mechanism of action of the proposed medication, as well as potential effects, both good and bad.  Patient expressed understanding and agreed with treatment.   Plan: triamcinolone (KENALOG) 0.1 % cream,         doxycycline (VIBRAMYCIN) 100 MG capsule            (N62) Large breasts  Comment: Patient interested in breast reduction surgery but needs to " stop smoking per plastic surgery  Plan:     (Z72.0) Tobacco abuse  Comment: As above  Plan:     (G89.4) Chronic pain syndrome  Comment: Problem list updated with status.  I do not think she is a good candidate for chronic narcotic therapy, only intermittent use, problem specific  Plan:        Follow up as needed   SEwa Esquivel MD    (Chart documentation completed in part with Dragon voice-recognition software.  Even though reviewed some grammatical, spelling, and word errors may remain.)

## 2018-08-28 DIAGNOSIS — I10 HYPERTENSION GOAL BP (BLOOD PRESSURE) < 140/90: ICD-10-CM

## 2018-08-28 NOTE — TELEPHONE ENCOUNTER
Requested Prescriptions   Pending Prescriptions Disp Refills     lisinopril-hydrochlorothiazide (PRINZIDE/ZESTORETIC) 20-12.5 MG per tablet 60 tablet 0     Sig: Take 2 tablets by mouth daily    There is no refill protocol information for this order        Last Written Prescription Date:  7/2/18  Last Fill Quantity: 60,  # refills: 0   Last office visit: 8/3/2018 with prescribing provider:  7/2/18   Future Office Visit:

## 2018-08-30 DIAGNOSIS — N94.6 DYSMENORRHEA: ICD-10-CM

## 2018-08-30 DIAGNOSIS — L02.91 ABSCESS: ICD-10-CM

## 2018-08-30 RX ORDER — OXYCODONE AND ACETAMINOPHEN 7.5; 325 MG/1; MG/1
1 TABLET ORAL 2 TIMES DAILY PRN
Qty: 30 TABLET | Refills: 0 | Status: CANCELLED | OUTPATIENT
Start: 2018-08-30

## 2018-08-30 NOTE — TELEPHONE ENCOUNTER
"Routing refill request to provider for review/approval because:  BP is out of range.          Requested Prescriptions   Pending Prescriptions Disp Refills     lisinopril-hydrochlorothiazide (PRINZIDE/ZESTORETIC) 20-12.5 MG per tablet 60 tablet 0     Sig: Take 2 tablets by mouth daily    Diuretics (Including Combos) Protocol Failed    8/30/2018 10:05 AM       Failed - Blood pressure under 140/90 in past 12 months    BP Readings from Last 3 Encounters:   08/03/18 (!) 142/100   07/02/18 (!) 144/92   06/12/18 (!) 178/102                Passed - Recent (12 mo) or future (30 days) visit within the authorizing provider's specialty    Patient had office visit in the last 12 months or has a visit in the next 30 days with authorizing provider or within the authorizing provider's specialty.  See \"Patient Info\" tab in inbasket, or \"Choose Columns\" in Meds & Orders section of the refill encounter.           Passed - Patient is age 18 or older       Passed - No active pregancy on record       Passed - Normal serum creatinine on file in past 12 months    Recent Labs   Lab Test  06/12/18   1456   CR  0.76             Passed - Normal serum potassium on file in past 12 months    Recent Labs   Lab Test  06/12/18   1456   POTASSIUM  4.3                   Passed - Normal serum sodium on file in past 12 months    Recent Labs   Lab Test  06/12/18   1456   NA  138             Passed - No positive pregnancy test in past 12 months          "

## 2018-08-30 NOTE — TELEPHONE ENCOUNTER
"Requested Prescriptions   Pending Prescriptions Disp Refills     oxyCODONE-acetaminophen (PERCOCET) 7.5-325 MG per tablet 30 tablet 0     Sig: Take 1 tablet by mouth 2 times daily as needed for pain    There is no refill protocol information for this order        triamcinolone (KENALOG) 0.1 % cream 30 g 0     Sig: Apply topically 3 times daily as needed for irritation    Topical Steroids and Nonsteroidals Protocol Passed    8/30/2018 11:25 AM       Passed - Patient is age 6 or older       Passed - Authorizing prescriber's most recent note related to this medication read.    If refill request is for ophthalmic use, please forward request to provider for approval.         Passed - High potency steroid not ordered       Passed - Recent (12 mo) or future (30 days) visit within the authorizing provider's specialty    Patient had office visit in the last 12 months or has a visit in the next 30 days with authorizing provider or within the authorizing provider's specialty.  See \"Patient Info\" tab in inbasket, or \"Choose Columns\" in Meds & Orders section of the refill encounter.            oxyCODONE-acetaminophen (PERCOCET) 7.5-325 MG per tablet      Last Written Prescription Date:  8/3/18  Last Fill Quantity: 30,   # refills: 0  Last Office Visit: 8/3/18  Future Office visit:       Routing refill request to provider for review/approval because:  Drug not on the [facility], [facility] or [facility] refill protocol or controlled substance    triamcinolone (KENALOG) 0.1 % cream  Last Written Prescription Date:  8/3/18  Last Fill Quantity: 30g,  # refills: 0   Last office visit: 8/3/2018 with prescribing provider:  Dr. Esquivel   Future Office Visit:      "

## 2018-08-30 NOTE — TELEPHONE ENCOUNTER
Reason for Call:  Medication or medication refill:    Do you use a [facility]?  Name of the pharmacy and phone number for the current request:  WRITTEN PRESCRIPTION     Name of the medication requested: oxyCODONE-acetaminophen (PERCOCET) 7.5-325 MG per tablet triamcinolone (KENALOG) 0.1 % cream    Other request:     Can we leave a detailed message on this number? YES    Phone number patient can be reached at: Home number on file [phone] (home)    Best Time: ANYTIME     Call taken on 8/30/2018 at 11:24 AM by Destiny Cummings

## 2018-08-31 ENCOUNTER — MYC REFILL (OUTPATIENT)
Dept: FAMILY MEDICINE | Facility: CLINIC | Age: 28
End: 2018-08-31

## 2018-08-31 DIAGNOSIS — N64.4 BREAST PAIN: ICD-10-CM

## 2018-08-31 DIAGNOSIS — G89.4 CHRONIC PAIN SYNDROME: Primary | ICD-10-CM

## 2018-08-31 RX ORDER — TRIAMCINOLONE ACETONIDE 1 MG/G
CREAM TOPICAL 3 TIMES DAILY PRN
Qty: 30 G | Refills: 0 | Status: SHIPPED | OUTPATIENT
Start: 2018-08-31 | End: 2019-02-18

## 2018-08-31 RX ORDER — LISINOPRIL AND HYDROCHLOROTHIAZIDE 12.5; 2 MG/1; MG/1
2 TABLET ORAL DAILY
Qty: 60 TABLET | Refills: 0 | Status: SHIPPED | OUTPATIENT
Start: 2018-08-31 | End: 2019-01-14

## 2018-08-31 RX ORDER — OXYCODONE AND ACETAMINOPHEN 7.5; 325 MG/1; MG/1
1 TABLET ORAL 2 TIMES DAILY PRN
Qty: 30 TABLET | Refills: 0 | OUTPATIENT
Start: 2018-08-31

## 2018-08-31 NOTE — TELEPHONE ENCOUNTER
Routing refill request to provider for review/approval because:  Patient is wanting a written Rx. Provider will need to sign.    Carrie Bennett RN, [facility]

## 2018-08-31 NOTE — TELEPHONE ENCOUNTER
Message from MyChart:  Original authorizing provider: MD Jigna Thomas MONIEwa Amaodr would like a refill of the following medications:  oxyCODONE-acetaminophen (PERCOCET) 7.5-325 MG per tablet [Barbara Esquivel MD]    Preferred pharmacy: [facility] [id number] [place] [address], [place] - [address] [facility] AT [address]    Comment:  Hi Doctor I have that boil again and I'm in bad pain

## 2018-09-04 NOTE — TELEPHONE ENCOUNTER
..Reason for Call:   prescription for the pain medication being denied    Detailed comments: percocet; states is in pain due to boil and menstrual cramping;       Phone Number Patient can be reached at: Home number on file [phone] (home)    Best Time: anytime    Can we leave a detailed message on this number? YES    Call taken on 9/4/2018 at 10:30 AM by Sarah Diamond

## 2018-09-07 NOTE — TELEPHONE ENCOUNTER
I think in her case an appointment would be needed for each refill.  I discussed this with her at our visit that I do not treat chronic pain, and she has been refilling this medication quite a lot lately.  It is not something to be used every day.  So she will need to be seen by 1 of the providers here or at an urgent care to reassess rather than just call in for a refill.    But if she does not think that that is helpful to her and we could get her referred to a pain clinic and she could discuss a more comprehensive program with them.

## 2018-09-07 NOTE — TELEPHONE ENCOUNTER
..Reason for Call:    Prescription pain medication denial    Detailed comments: please call to discuss reason for denial, is an appointment needed?    Phone Number Patient can be reached at: Home number on file [phone] (home)    Best Time: any    Can we leave a detailed message on this number? YES    Call taken on 9/7/2018 at 2:24 PM by Sarah Diamond

## 2018-09-10 ENCOUNTER — TELEPHONE (OUTPATIENT)
Dept: PALLIATIVE MEDICINE | Facility: CLINIC | Age: 28
End: 2018-09-10

## 2018-09-10 NOTE — LETTER
September 10, 2018    Jigna Amador  [address]  [place] [place] [address]    Dear Jigna                                                                 Welcome to the [facility].  We are located at [address] [place] [id number]. Your appointment at the [facility] has been scheduled on Tuesday September 18, 2018 at 2:00 PM with Harsha Lance MD .    At your first visit, you will meet your team of caregivers who will help you to develop pain management strategies that will last a lifetime. You will meet with our support staff to review your insurance information, and collect your co-payment if required by your insurance company. You will also meet with a medical pain specialist and care coordinator who will assess your pain and develop a plan of care for your successful pain rehabilitation. You should expect to spend 1-2 hours at your first visit with us. Usually, patients work with us for a period of 6-12 months, and eventually return to their primary doctor once their pain management has stabilized.      To help us make your visit go as smoothly as possible, please bring the following items with you on your visit:     Completed Pain Questionnaire enclosed in this packet.  If you do not bring the completed questionnaire, we may have to reschedule your appointment.  List of any medicines that you are currently taking or have been prescribed  Important NON-[place] medical information such as medical records or tests results (X-rays, or laboratory tests)  Your health insurance card  Financial resources to cover your co-payment or balance due at the time of service (cash, personal check, Visa, and MasterCard are acceptable methods of payment)     Due to the demand for new patient evaluations, you must notify the scheduling department 48 hours in advance if you are not able to keep this appointment. Failure to do so could affect your ability to reschedule with our clinic.  Please be aware that we will not prescribe any medications at your first visit.     Please call [phone] with any questions regarding your appointment. We look forward to meeting you and working to address your health care needs.     Sincerely,    [facility]

## 2018-09-10 NOTE — TELEPHONE ENCOUNTER
Pain Management Center Referral      1. Confirmed address with patient? Yes  2. Confirmed phone number with patient? Yes  3. Confirmed referring provider? Yes  4. Is the PCP the same as the referring provider? Yes  5. Has the patient been to any previous pain clinics? No  (If yes, send MARIANNA with welcome letter)  6. Which insurance are we to bill for this appointment?  [employer]    7. Informed pt of cancellation (48 hour) policy? Yes    REGARDING OPIOID MEDICATIONS: We will always address appropriateness of opioid pain medications, but we generally will not automatically take on a prescribing role. When we do take on prescribing of opioids for chronic pain, it is in collaboration with the referring physician for an intermediate period of time (months), with an expectation that the primary physician or provider will assume the prescribing role if medications are effective at stable doses with demonstrated compliance. Therefore, please do not assume that your prescribing responsibilities end on the day of pain clinic consultation.  7. Informed pt of prescribing policy? Yes      8. Referring Provider: Barbara Esquivel

## 2018-09-10 NOTE — TELEPHONE ENCOUNTER
This writer attempted to contact 1 on 09/10/18      Reason for call info and left detailed message.      If patient calls back:   Patient contacted by [facility] Team (MA/TC). Inform patient that someone from the team will contact them, document that pt called and route to care team.       [id number], MEDICAL ASSISTANT

## 2019-01-14 ENCOUNTER — OFFICE VISIT (OUTPATIENT)
Dept: FAMILY MEDICINE | Facility: CLINIC | Age: 29
End: 2019-01-14
Payer: COMMERCIAL

## 2019-01-14 VITALS
DIASTOLIC BLOOD PRESSURE: 94 MMHG | HEART RATE: 71 BPM | SYSTOLIC BLOOD PRESSURE: 140 MMHG | HEIGHT: 66 IN | RESPIRATION RATE: 20 BRPM | OXYGEN SATURATION: 100 % | TEMPERATURE: 97.2 F | BODY MASS INDEX: 47.09 KG/M2 | WEIGHT: 293 LBS

## 2019-01-14 DIAGNOSIS — N94.6 DYSMENORRHEA: ICD-10-CM

## 2019-01-14 DIAGNOSIS — E66.01 MORBID OBESITY WITH BMI OF 50.0-59.9, ADULT (H): ICD-10-CM

## 2019-01-14 DIAGNOSIS — I10 HYPERTENSION GOAL BP (BLOOD PRESSURE) < 140/90: Primary | ICD-10-CM

## 2019-01-14 PROCEDURE — 99214 OFFICE O/P EST MOD 30 MIN: CPT | Performed by: FAMILY MEDICINE

## 2019-01-14 RX ORDER — LEVONORGESTREL / ETHINYL ESTRADIOL AND ETHINYL ESTRADIOL 150-30(84)
1 KIT ORAL DAILY
Qty: 91 TABLET | Refills: 3 | Status: SHIPPED | OUTPATIENT
Start: 2019-01-14 | End: 2020-07-20

## 2019-01-14 RX ORDER — ATENOLOL AND CHLORTHALIDONE TABLET 100; 25 MG/1; MG/1
1 TABLET ORAL DAILY
Qty: 30 TABLET | Refills: 1 | Status: SHIPPED | OUTPATIENT
Start: 2019-01-14 | End: 2019-02-18

## 2019-01-14 RX ORDER — OXYCODONE AND ACETAMINOPHEN 7.5; 325 MG/1; MG/1
1 TABLET ORAL 2 TIMES DAILY PRN
Qty: 30 TABLET | Refills: 0 | Status: SHIPPED | OUTPATIENT
Start: 2019-01-14 | End: 2019-02-18

## 2019-01-14 ASSESSMENT — ANXIETY QUESTIONNAIRES
1. FEELING NERVOUS, ANXIOUS, OR ON EDGE: SEVERAL DAYS
5. BEING SO RESTLESS THAT IT IS HARD TO SIT STILL: NOT AT ALL
7. FEELING AFRAID AS IF SOMETHING AWFUL MIGHT HAPPEN: NOT AT ALL
6. BECOMING EASILY ANNOYED OR IRRITABLE: NEARLY EVERY DAY
GAD7 TOTAL SCORE: 13
3. WORRYING TOO MUCH ABOUT DIFFERENT THINGS: NEARLY EVERY DAY
2. NOT BEING ABLE TO STOP OR CONTROL WORRYING: NEARLY EVERY DAY

## 2019-01-14 ASSESSMENT — PATIENT HEALTH QUESTIONNAIRE - PHQ9
SUM OF ALL RESPONSES TO PHQ QUESTIONS 1-9: 13
5. POOR APPETITE OR OVEREATING: NEARLY EVERY DAY

## 2019-01-14 ASSESSMENT — MIFFLIN-ST. JEOR: SCORE: 2198.46

## 2019-01-14 NOTE — PROGRESS NOTES
SUBJECTIVE:   Jigna Amador is a 28 year old female who presents to clinic today for the following health issues:      Hypertension Follow-up      Outpatient blood pressures are not being checked.    Low Salt Diet: not monitoring salt    Chronic Pain Follow-Up       Type / Location of Pain: Back, Vaginal area   Analgesia/pain control:       Recent changes:  worse      Overall control: Inadequate pain control  Activity level/function:      Daily activities:  Unable to perform most daily activities - chores, hobbies, social activities, driving    Work:  Full time without limitations   Adverse effects:  No  Adherance    Taking medication as directed?  Yes    Participating in other treatments: yes  Risk Factors:    Sleep:  Poor    Mood/anxiety:  worsened    Recent family or social stressors:  Family related     Other aggravating factors: prolonged sitting and prolonged standing  No flowsheet data found.  No flowsheet data found.  Encounter-Level CSA:    There are no encounter-level csa.     Patient-Level CSA:    There are no patient-level csa.         Amount of exercise or physical activity: None    Problems taking medications regularly: No    Medication side effects: none    Diet: regular (no restrictions)    SUBJECTIVE:  Here today in follow-up of hypertension and dysmenorrhea.  We discussed the relationship of her obesity to her blood pressure is well and that lifestyle modification is paramount to getting her blood pressure under control in addition to medical therapy.  Has been off of her amlodipine and Zestoretic.  Says it makes her sides hurt but she is not sure which particular want.  Does not follow blood pressure on the outside.  Has very heavy monthly menstrual cycles would like to do something about it.  When I last saw her she declined hormonal therapy because in the past and it made her gain weight.  I discussed newer agents that are out there including longer stretches of cycle suppression.  Intermittent  "smoking we discussed the potential risk of hormone therapy and smoking.    Review of systems otherwise negative.  Past medical, family, and social history reviewed and updated in chart.    OBJECTIVE:  BP (!) 140/94 (BP Location: Right arm, Patient Position: Sitting, Cuff Size: Adult Large)   Pulse 71   Temp 97.2  F (36.2  C) (Oral)   Resp 20   Ht 1.664 m (5' 5.5\")   Wt 147 kg (324 lb)   LMP 01/10/2019   SpO2 100%   BMI 53.10 kg/m    Alert, pleasant, upbeat, and in no apparent discomfort.  Morbidly obese  S1 and S2 normal, no murmurs, clicks, gallops or rubs. Regular rate and rhythm. Chest is clear; no wheezes or rales. No edema or JVD.  Past labs reviewed with the patient.     ASSESSMENT / PLAN:  (I10) Hypertension goal BP (blood pressure) < 140/90  (primary encounter diagnosis)  Comment: She has been off medication altogether and I like to start her on Tenoretic once daily and set up a follow-up in 3-4 weeks to see if it is actually effective and recheck lab work done  Plan: atenolol-chlorthalidone (TENORETIC) 100-25 MG         tablet            (E66.01,  Z68.42) Morbid obesity with BMI of 50+, adult (H)  Comment: Again counseled on lifestyle management  Plan:     (N94.6) Dysmenorrhea  Comment: Discussed mechanism of action of the proposed medication, as well as potential effects, both good and bad.  Patient expressed understanding and agreed with treatment.   Plan: oxyCODONE-acetaminophen (PERCOCET) 7.5-325 MG         per tablet, levonorgest-eth estrad 91-Day         (SEASONIQUE) 0.15-0.03 &0.01 MG tablet            Follow up 1 month for blood pressure recheck  SEwa Esquivel MD    (Chart documentation completed in part with Dragon voice-recognition software.  Even though reviewed some grammatical, spelling, and word errors may remain.)     "

## 2019-01-15 ASSESSMENT — ANXIETY QUESTIONNAIRES: GAD7 TOTAL SCORE: 13

## 2019-02-14 ENCOUNTER — TELEPHONE (OUTPATIENT)
Dept: FAMILY MEDICINE | Facility: CLINIC | Age: 29
End: 2019-02-14

## 2019-02-14 DIAGNOSIS — N94.6 DYSMENORRHEA: ICD-10-CM

## 2019-02-14 RX ORDER — OXYCODONE AND ACETAMINOPHEN 7.5; 325 MG/1; MG/1
1 TABLET ORAL 2 TIMES DAILY PRN
Qty: 30 TABLET | Refills: 0 | OUTPATIENT
Start: 2019-02-14

## 2019-02-14 NOTE — TELEPHONE ENCOUNTER
Called and spoke with patient regarding reported boils and pain issues.     Patient states that since September 2018 (last time patient was seen in clinic for boil issues) she has been experiencing intermittent boils around vaginal area that flare up/appear when she begins her menstrual cycle and then begin to resolve after the menstrual cycle, then the process starts again.     Patient denies boil having opened or draining any fluid. She denies signs or symptoms of acute infection. Denies bleeding of area. Patient is already scheduled with Dr. Esquivel on Monday 2/18/19 for OV - this issue may also be addressed then. Nothing emergent appears to be occurring based on triage assessment.     Patient is requesting Percocet Rx. Patient asking if this can be filled by tomorrow? Writer advised Dr. Esquivel is out of office today but will send high priority.    Routing refill request to provider for review/approval because:  Drug not on the [facility] refill protocol     Natalie Santana RN

## 2019-02-14 NOTE — TELEPHONE ENCOUNTER
Reviewed [place]  and last filled 1/14/19.  In problem list under chronic pain provider states does not feel she is good candidate for chronic narcotics - only occasional percocet. Since just filled one month ago I am not comfortable filling prescription- will need to review with Dr. Esquivel who is back in the office on Monday

## 2019-02-14 NOTE — TELEPHONE ENCOUNTER
Patient is asking for refill on her percocet  Call her when the paper copy is ready at     Her boils came back and she is having pain issues    [phone] ok to leave message

## 2019-02-18 DIAGNOSIS — N94.6 DYSMENORRHEA: ICD-10-CM

## 2019-02-18 DIAGNOSIS — L02.91 ABSCESS: ICD-10-CM

## 2019-02-18 DIAGNOSIS — I10 HYPERTENSION GOAL BP (BLOOD PRESSURE) < 140/90: ICD-10-CM

## 2019-02-18 RX ORDER — ATENOLOL AND CHLORTHALIDONE TABLET 100; 25 MG/1; MG/1
1 TABLET ORAL DAILY
Qty: 30 TABLET | Refills: 0 | Status: SHIPPED | OUTPATIENT
Start: 2019-02-18 | End: 2019-03-19

## 2019-02-18 RX ORDER — OXYCODONE AND ACETAMINOPHEN 7.5; 325 MG/1; MG/1
1 TABLET ORAL 2 TIMES DAILY PRN
Qty: 18 TABLET | Refills: 0 | Status: SHIPPED | OUTPATIENT
Start: 2019-02-18 | End: 2019-03-19

## 2019-02-18 RX ORDER — TRIAMCINOLONE ACETONIDE 1 MG/G
CREAM TOPICAL 3 TIMES DAILY PRN
Qty: 30 G | Refills: 0 | Status: SHIPPED | OUTPATIENT
Start: 2019-02-18 | End: 2020-12-08

## 2019-02-18 NOTE — TELEPHONE ENCOUNTER
"Routing refill request to provider for review/approval because:  Drug not on the [facility] [place] - Percocet -  not checked as provider is still pending for approval of RN's to check  on his behalf.  Patient should have a 30 day supply left on Rx of Tenoretic.  Not discussed in progress notes from last OV - Triamcinolone Cream.    Natalie Santana, RN    Requested Prescriptions   Pending Prescriptions Disp Refills     oxyCODONE-acetaminophen (PERCOCET) 7.5-325 MG per tablet 30 tablet 0     Sig: Take 1 tablet by mouth 2 times daily as needed for pain    There is no refill protocol information for this order        atenolol-chlorthalidone (TENORETIC) 100-25 MG tablet 30 tablet 1     Sig: Take 1 tablet by mouth daily    Beta-Blockers Protocol Failed - 2/18/2019 11:35 AM       Failed - Blood pressure under 140/90 in past 12 months    BP Readings from Last 3 Encounters:   01/14/19 (!) 140/94   08/03/18 (!) 142/100   07/02/18 (!) 144/92                Passed - Patient is age 6 or older       Passed - Recent (12 mo) or future (30 days) visit within the authorizing provider's specialty    Patient had office visit in the last 12 months or has a visit in the next 30 days with authorizing provider or within the authorizing provider's specialty.  See \"Patient Info\" tab in inbasket, or \"Choose Columns\" in Meds & Orders section of the refill encounter.             Passed - Medication is active on med list        triamcinolone (KENALOG) 0.1 % external cream 30 g 0     Sig: Apply topically 3 times daily as needed for irritation    Topical Steroids and Nonsteroidals Protocol Passed - 2/18/2019 11:35 AM       Passed - Patient is age 6 or older       Passed - Authorizing prescriber's most recent note related to this medication read.    If refill request is for ophthalmic use, please forward request to provider for approval.         Passed - High potency steroid not ordered       Passed - Recent (12 mo) or future (30 days) visit " "within the authorizing provider's specialty    Patient had office visit in the last 12 months or has a visit in the next 30 days with authorizing provider or within the authorizing provider's specialty.  See \"Patient Info\" tab in inbasket, or \"Choose Columns\" in Meds & Orders section of the refill encounter.             Passed - Medication is active on med list          "

## 2019-02-18 NOTE — TELEPHONE ENCOUNTER
Reason for Call:  Medication or medication refill:    Do you use a [facility]?  Name of the pharmacy and phone number for the current request:  [facility] in [place]    Name of the medication requested:     atenolol-chlorthalidone (TENORETIC) 100-25 MG tablet    oxyCODONE-acetaminophen (PERCOCET) 7.5-325 MG per tablet    triamcinolone (KENALOG) 0.1 % cream    Other request: Patient is out of the medications. Would like to see if she can get the refill as soon as possible.     Can we leave a detailed message on this number? YES    Phone number patient can be reached at: Cell number on file:    Telephone Information:   Mobile [phone]       Best Time: any    Call taken on 2/18/2019 at 11:32 AM by Reema Correa

## 2019-03-19 ENCOUNTER — OFFICE VISIT (OUTPATIENT)
Dept: FAMILY MEDICINE | Facility: CLINIC | Age: 29
End: 2019-03-19
Payer: COMMERCIAL

## 2019-03-19 VITALS
DIASTOLIC BLOOD PRESSURE: 88 MMHG | OXYGEN SATURATION: 100 % | HEART RATE: 90 BPM | BODY MASS INDEX: 47.09 KG/M2 | SYSTOLIC BLOOD PRESSURE: 138 MMHG | WEIGHT: 293 LBS | HEIGHT: 66 IN | TEMPERATURE: 98.3 F

## 2019-03-19 DIAGNOSIS — N89.8 VAGINAL ITCHING: ICD-10-CM

## 2019-03-19 DIAGNOSIS — I10 HYPERTENSION GOAL BP (BLOOD PRESSURE) < 140/90: ICD-10-CM

## 2019-03-19 DIAGNOSIS — R30.0 DYSURIA: ICD-10-CM

## 2019-03-19 DIAGNOSIS — N94.6 DYSMENORRHEA: Primary | ICD-10-CM

## 2019-03-19 PROCEDURE — 99214 OFFICE O/P EST MOD 30 MIN: CPT | Performed by: FAMILY MEDICINE

## 2019-03-19 RX ORDER — ATENOLOL AND CHLORTHALIDONE TABLET 100; 25 MG/1; MG/1
1 TABLET ORAL DAILY
Qty: 30 TABLET | Refills: 5 | Status: SHIPPED | OUTPATIENT
Start: 2019-03-19 | End: 2019-04-21

## 2019-03-19 RX ORDER — FLUCONAZOLE 150 MG/1
150 TABLET ORAL DAILY
Qty: 3 TABLET | Refills: 0 | Status: SHIPPED | OUTPATIENT
Start: 2019-03-19 | End: 2019-03-22

## 2019-03-19 RX ORDER — OXYCODONE AND ACETAMINOPHEN 7.5; 325 MG/1; MG/1
1 TABLET ORAL 2 TIMES DAILY PRN
Qty: 18 TABLET | Refills: 0 | Status: SHIPPED | OUTPATIENT
Start: 2019-03-19 | End: 2019-04-09

## 2019-03-19 RX ORDER — CEPHALEXIN 500 MG/1
500 CAPSULE ORAL 3 TIMES DAILY
Qty: 21 CAPSULE | Refills: 0 | Status: SHIPPED | OUTPATIENT
Start: 2019-03-19 | End: 2019-03-26

## 2019-03-19 ASSESSMENT — MIFFLIN-ST. JEOR: SCORE: 2152.64

## 2019-03-19 NOTE — PROGRESS NOTES
SUBJECTIVE:   Jigna Amador is a 29 year old female who presents to clinic today for the following health issues:      URINARY TRACT SYMPTOMS  Onset: 2 days, patient also has sores on both left and right breast, severe pain on breast.     Description:   Painful urination (Dysuria): YES  Blood in urine (Hematuria): no   Delay in urine (Hesitency): YES    Intensity: mild     Progression of Symptoms:  worsening    Accompanying Signs & Symptoms:  Fever/chills: no   Flank pain no   Nausea and vomiting: no   Any vaginal symptoms: vaginal itching  Abdominal/Pelvic Pain: YES    History:   History of frequent UTI's: no   History of kidney stones: no   Sexually Active: no   Possibility of pregnancy: No    Precipitating factors:   None    Therapies Tried and outcome: Cranberry juice    SUBJECTIVE:  Here today with the above.  Patient well-known to me when I saw her last month we started her on some Seasonique.  Has been using this but still having a very heavy painful bleeding and cramping.  Recently has been having some urinary frequency and dysuria combined with some vaginal itch.  But because of bleeding she is not certain if she really has a discharge.  We talked about obtaining a sample today but she said everything is too much blood to really get a good sample does not really feel comfortable having an exam.  We discussed strategies to deal with this situation.  Continues to have sores on her breasts and occasionally form small abscesses.  I discussed with the patient whether she would be willing to consider surgical options to help deal with her dysmenorrhea.  We discussed the basics of endometrial ablation versus hysterectomy versus medical management.  Has been taking her blood pressure medication but not following on the outside.  No particular side effects other than a little bit of urinary frequency.    Review of systems otherwise negative.  Past medical, family, and social history reviewed and updated in  "chart.    OBJECTIVE:  /88 (BP Location: Right arm, Patient Position: Chair, Cuff Size: Adult Large)   Pulse 90   Temp 98.3  F (36.8  C) (Oral)   Ht 1.664 m (5' 5.5\")   Wt 142.9 kg (315 lb)   LMP 03/19/2019 (Exact Date)   SpO2 100%   Breastfeeding? No   BMI 51.62 kg/m    Alert, pleasant, upbeat, and in no apparent discomfort.  Obese  Small sores on the surface of her breast, none of which are fully formed abscesses  S1 and S2 normal, no murmurs, clicks, gallops or rubs. Regular rate and rhythm. Chest is clear; no wheezes or rales. No edema or JVD.  The abdomen is soft without tenderness, guarding, mass, rebound or organomegaly. Bowel sounds are normal. No CVA tenderness or inguinal adenopathy noted.   Past labs reviewed with the patient.     ASSESSMENT / PLAN:  (N94.6) Dysmenorrhea  (primary encounter diagnosis)  Comment: Offered GYN referral  Plan: OB/GYN REFERRAL, oxyCODONE-acetaminophen         (PERCOCET) 7.5-325 MG per tablet            (R30.0) Dysuria  Comment: Not able to obtain a good sample today.  We treat Keflex to help with the abscesses as well as any urinary infections  Plan: cephALEXin (KEFLEX) 500 MG capsule            (N89.8) Vaginal itching  Comment: As above  Plan: fluconazole (DIFLUCAN) 150 MG tablet            (I10) Hypertension goal BP (blood pressure) < 140/90  Comment: Refilled as it seems to be working.  Plan: atenolol-chlorthalidone (TENORETIC) 100-25 MG         tablet            Follow up 2 weeks if needed.  The upcoming months will recheck any hypertension related blood work  SEwa Esquivel MD    (Chart documentation completed in part with Dragon voice-recognition software.  Even though reviewed some grammatical, spelling, and word errors may remain.)     "

## 2019-04-09 ENCOUNTER — OFFICE VISIT (OUTPATIENT)
Dept: FAMILY MEDICINE | Facility: CLINIC | Age: 29
End: 2019-04-09
Payer: COMMERCIAL

## 2019-04-09 VITALS
WEIGHT: 293 LBS | DIASTOLIC BLOOD PRESSURE: 80 MMHG | OXYGEN SATURATION: 97 % | HEIGHT: 66 IN | TEMPERATURE: 98 F | BODY MASS INDEX: 47.09 KG/M2 | SYSTOLIC BLOOD PRESSURE: 132 MMHG | HEART RATE: 80 BPM

## 2019-04-09 DIAGNOSIS — N94.6 DYSMENORRHEA: ICD-10-CM

## 2019-04-09 DIAGNOSIS — G89.4 CHRONIC PAIN SYNDROME: ICD-10-CM

## 2019-04-09 DIAGNOSIS — I10 HYPERTENSION GOAL BP (BLOOD PRESSURE) < 140/90: ICD-10-CM

## 2019-04-09 DIAGNOSIS — N61.1 ABSCESS OF BREAST: Primary | ICD-10-CM

## 2019-04-09 PROCEDURE — 80048 BASIC METABOLIC PNL TOTAL CA: CPT | Performed by: FAMILY MEDICINE

## 2019-04-09 PROCEDURE — 99214 OFFICE O/P EST MOD 30 MIN: CPT | Performed by: FAMILY MEDICINE

## 2019-04-09 PROCEDURE — 36415 COLL VENOUS BLD VENIPUNCTURE: CPT | Performed by: FAMILY MEDICINE

## 2019-04-09 PROCEDURE — 80061 LIPID PANEL: CPT | Performed by: FAMILY MEDICINE

## 2019-04-09 PROCEDURE — 82043 UR ALBUMIN QUANTITATIVE: CPT | Performed by: FAMILY MEDICINE

## 2019-04-09 RX ORDER — OXYCODONE AND ACETAMINOPHEN 7.5; 325 MG/1; MG/1
1 TABLET ORAL 2 TIMES DAILY PRN
Qty: 18 TABLET | Refills: 0 | Status: SHIPPED | OUTPATIENT
Start: 2019-04-09 | End: 2019-04-21

## 2019-04-09 RX ORDER — MINOCYCLINE HYDROCHLORIDE 100 MG/1
100 CAPSULE ORAL DAILY
Qty: 30 CAPSULE | Refills: 2 | Status: SHIPPED | OUTPATIENT
Start: 2019-04-09 | End: 2020-03-02

## 2019-04-09 RX ORDER — ZINC OXIDE 20 %
OINTMENT (GRAM) TOPICAL
Qty: 60 G | Refills: 0 | Status: SHIPPED | OUTPATIENT
Start: 2019-04-09 | End: 2021-03-09

## 2019-04-09 ASSESSMENT — MIFFLIN-ST. JEOR: SCORE: 2175.32

## 2019-04-09 NOTE — PATIENT INSTRUCTIONS
Plan:    1) start minocycline (good antibiotic for skin) once a day to help prevent these boils    2) try zinc oxide ointment to the breast skin twice daily as needed (especially at bedtime) to see if that dries up some of the moisture and helps prevent the boils

## 2019-04-09 NOTE — PROGRESS NOTES
"  SUBJECTIVE:   Jigna Amador is a 29 year old female who presents to clinic today for the following   health issues:      Boil(S)  Onset: couple of days     Description:   Location: Right breast   Number of warts: 2  Painful: YES    Accompanying Signs & Symptoms:  Signs of infection: no     History:   History of trauma: no   Prior boils: YES    Therapies Tried and outcome: Antibiotics     SUBJECTIVE:  Here today with the above and to follow-up on routine issues.  Is on Tenoretic and numbers look good today as far as blood pressure goes.  Patient has a couple of tender boils underneath right breast.  Seems to be worse in warmer weather when she tends to get sweaty.  She notes that nighttime skin gets quite irritated and she sometimes puts socks under her breast to help dry up the sweat.  Most recent cycle was definitely lighter and she like to continue with the Seasonique for a while before considering possible surgical measures    Review of systems otherwise negative.  Past medical, family, and social history reviewed and updated in chart.    OBJECTIVE:  /80 (BP Location: Right arm, Patient Position: Chair, Cuff Size: Adult Large)   Pulse 80   Temp 98  F (36.7  C) (Oral)   Ht 1.664 m (5' 5.5\")   Wt 145.2 kg (320 lb)   LMP 03/19/2019 (Exact Date)   SpO2 97%   Breastfeeding? No   BMI 52.44 kg/m     Alert, pleasant, upbeat, and in no apparent discomfort.  Morbidly obese  S1 and S2 normal, no murmurs, clicks, gallops or rubs. Regular rate and rhythm. Chest is clear; no wheezes or rales. No edema or JVD.  Skin -2 abscesses close to the skin surface underneath right breast.  Offered I&D but patient declined  Past labs reviewed with the patient.     ASSESSMENT / PLAN:  (N61.1) Abscess of breast  (primary encounter diagnosis)  Comment: Like to get her started on minocycline once daily for at least a few weeks as a preventive measure.  But also try some zinc oxide ointment as a barrier to the moisture at " night  Plan: minocycline (MINOCIN/DYNACIN) 100 MG capsule,         zinc oxide (DESITIN) 20 % external ointment            (I10) Hypertension goal BP (blood pressure) < 140/90  Comment: Controlled on current dosage.  Will monitor renal function  Plan: Basic metabolic panel, Lipid panel reflex to         direct LDL Non-fasting, Albumin Random Urine         Quantitative with Creat Ratio            (N94.6) Dysmenorrhea  Comment: Continue Seasonique  Plan: oxyCODONE-acetaminophen (PERCOCET) 7.5-325 MG         per tablet            (G89.4) Chronic pain syndrome  Comment:   Plan: Comprehen Drug Analysis UR            Follow up 3 months  BERTHA Esquivel MD    (Chart documentation completed in part with Dragon voice-recognition software.  Even though reviewed some grammatical, spelling, and word errors may remain.)

## 2019-04-10 LAB
ANION GAP SERPL CALCULATED.3IONS-SCNC: 8 MMOL/L (ref 3–14)
BUN SERPL-MCNC: 8 MG/DL (ref 7–30)
CALCIUM SERPL-MCNC: 9.2 MG/DL (ref 8.5–10.1)
CHLORIDE SERPL-SCNC: 111 MMOL/L (ref 94–109)
CHOLEST SERPL-MCNC: 130 MG/DL
CO2 SERPL-SCNC: 20 MMOL/L (ref 20–32)
CREAT SERPL-MCNC: 0.7 MG/DL (ref 0.52–1.04)
CREAT UR-MCNC: 76 MG/DL
GFR SERPL CREATININE-BSD FRML MDRD: >90 ML/MIN/{1.73_M2}
GLUCOSE SERPL-MCNC: 90 MG/DL (ref 70–99)
HDLC SERPL-MCNC: 46 MG/DL
LDLC SERPL CALC-MCNC: 62 MG/DL
MICROALBUMIN UR-MCNC: <5 MG/L
MICROALBUMIN/CREAT UR: NORMAL MG/G CR (ref 0–25)
NONHDLC SERPL-MCNC: 84 MG/DL
POTASSIUM SERPL-SCNC: 4.4 MMOL/L (ref 3.4–5.3)
SODIUM SERPL-SCNC: 139 MMOL/L (ref 133–144)
TRIGL SERPL-MCNC: 112 MG/DL

## 2019-04-21 ENCOUNTER — MYC REFILL (OUTPATIENT)
Dept: FAMILY MEDICINE | Facility: CLINIC | Age: 29
End: 2019-04-21

## 2019-04-21 DIAGNOSIS — N94.6 DYSMENORRHEA: ICD-10-CM

## 2019-04-21 DIAGNOSIS — G89.4 CHRONIC PAIN SYNDROME: ICD-10-CM

## 2019-04-21 DIAGNOSIS — I10 HYPERTENSION GOAL BP (BLOOD PRESSURE) < 140/90: ICD-10-CM

## 2019-04-22 NOTE — TELEPHONE ENCOUNTER
"Requested Prescriptions   Pending Prescriptions Disp Refills     atenolol-chlorthalidone (TENORETIC) 100-25 MG tablet 30 tablet 5     Sig: Take 1 tablet by mouth daily       Beta-Blockers Protocol Passed - 4/22/2019  6:52 AM        Passed - Blood pressure under 140/90 in past 12 months     BP Readings from Last 3 Encounters:   04/09/19 132/80   03/19/19 138/88   01/14/19 (!) 140/94                 Passed - Patient is age 6 or older        Passed - Recent (12 mo) or future (30 days) visit within the authorizing provider's specialty     Patient had office visit in the last 12 months or has a visit in the next 30 days with authorizing provider or within the authorizing provider's specialty.  See \"Patient Info\" tab in inbasket, or \"Choose Columns\" in Meds & Orders section of the refill encounter.              Passed - Medication is active on med list        oxyCODONE-acetaminophen (PERCOCET) 7.5-325 MG per tablet 18 tablet 0     Sig: Take 1 tablet by mouth 2 times daily as needed for pain       There is no refill protocol information for this order        atenolol-chlorthalidone (TENORETIC) 100-25 MG tablet  Last Written Prescription Date:  3/19/19  Last Fill Quantity: 30,  # refills: 5   Last office visit: 4/9/2019 with prescribing provider:  Dr. Esquivel   Future Office Visit:      oxyCODONE-acetaminophen (PERCOCET) 7.5-325 MG per tablet      Last Written Prescription Date:  4/9/19  Last Fill Quantity: 18,   # refills: 0  Last Office Visit: 4/9/19  Future Office visit:       Routing refill request to provider for review/approval because:  Drug not on the G, P or [facility] refill protocol or controlled substance    "

## 2019-04-23 DIAGNOSIS — G89.4 CHRONIC PAIN SYNDROME: ICD-10-CM

## 2019-04-23 RX ORDER — ATENOLOL AND CHLORTHALIDONE TABLET 100; 25 MG/1; MG/1
1 TABLET ORAL DAILY
Qty: 30 TABLET | Refills: 2 | Status: SHIPPED | OUTPATIENT
Start: 2019-04-23 | End: 2019-11-01

## 2019-04-23 RX ORDER — OXYCODONE AND ACETAMINOPHEN 7.5; 325 MG/1; MG/1
1 TABLET ORAL 2 TIMES DAILY PRN
Qty: 18 TABLET | Refills: 0 | Status: SHIPPED | OUTPATIENT
Start: 2019-04-23 | End: 2019-05-13

## 2019-04-23 NOTE — TELEPHONE ENCOUNTER
Prescription refilled.  But I would like a urine sample left for urine tox screen before picking up prescription.  This is part of the new policy regarding chronic pain prescriptions.  Patient herself needs to  - no proxy.  Needs the UDS    Will obtain CSA next visit

## 2019-04-23 NOTE — TELEPHONE ENCOUNTER
Controlled Substance Refill Request for Percocet  Problem List Complete:  Yes  Problem Detail     Noted:  8/3/2018   Priority:  Medium   Overview Signed 8/3/2018  3:05 PM by Barbara Esquivel MD   Patient is followed by Barbara Esquivel MD for ongoing prescription of pain medication.  All refills should only be approved by this provider, or covering partner.     Only occasional percocet 7.5  I do not feel she is a good candidate for chronic narcotic therapy        Controlled substance agreement:      Encounter-Level CSA:      There are no encounter-level csa.          Pain Clinic evaluation in the past: No     DIRE Total Score(s):  No flowsheet data found.     Last [facility] website verification:  04/23/19    [email or web address]       checked in past 3 months?  Yes 4/23/19   Last Written Prescription Date:  4/18/19  Last Fill Quantity: 18 tablets  # refills: 0   Last office visit: 4/9/2019 with prescribing provider:  4/9/19   Future Office Visit:    [place] reviewed:  reviewed- no concerns    [place]  [email or web address]          For atenolol-chlorthalidone:  Different pharmacy being requested for refill than previously sent to.   Prescription approved per [facility] [place] Protocol.              Mary Alaniz RN, BSN

## 2019-05-13 ENCOUNTER — MYC REFILL (OUTPATIENT)
Dept: FAMILY MEDICINE | Facility: CLINIC | Age: 29
End: 2019-05-13

## 2019-05-13 DIAGNOSIS — N94.6 DYSMENORRHEA: ICD-10-CM

## 2019-05-13 NOTE — TELEPHONE ENCOUNTER
Requested Prescriptions   Pending Prescriptions Disp Refills     tiZANidine (ZANAFLEX) 2 MG tablet 30 tablet 0     Sig: Take 1 tablet (2 mg) by mouth 3 times daily as needed (cramps)       There is no refill protocol information for this order        oxyCODONE-acetaminophen (PERCOCET) 7.5-325 MG per tablet 18 tablet 0     Sig: Take 1 tablet by mouth 2 times daily as needed for pain       There is no refill protocol information for this order        tiZANidine (ZANAFLEX) 2 MG tablet  Last Written Prescription Date:  8/3/18  Last Fill Quantity: 30,  # refills: 0   Last office visit: 4/9/2019 with prescribing provider:  Dr. Esquivel   Future Office Visit:      oxyCODONE-acetaminophen (PERCOCET) 7.5-325 MG per tablet      Last Written Prescription Date:  4/23/19  Last Fill Quantity: 18,   # refills: 0  Last Office Visit: 4/9/19  Future Office visit:       Routing refill request to provider for review/approval because:  Drug not on the G, P or [facility] refill protocol or controlled substance

## 2019-05-15 RX ORDER — TIZANIDINE 2 MG/1
2 TABLET ORAL 3 TIMES DAILY PRN
Qty: 30 TABLET | Refills: 0 | Status: SHIPPED | OUTPATIENT
Start: 2019-05-15 | End: 2019-11-01

## 2019-05-15 RX ORDER — OXYCODONE AND ACETAMINOPHEN 7.5; 325 MG/1; MG/1
1 TABLET ORAL 2 TIMES DAILY PRN
Qty: 18 TABLET | Refills: 0 | Status: SHIPPED | OUTPATIENT
Start: 2019-05-15 | End: 2019-05-28

## 2019-05-15 NOTE — TELEPHONE ENCOUNTER
For tizanidine:  Routing refill request to provider for review/approval because:  Drug not on the [facility] refill protocol     Controlled Substance Refill Request for Percocet  Problem List Complete:  Yes  Problem Detail     Noted:  8/3/2018   Priority:  Medium   Overview Addendum 4/23/2019 11:39 AM by Mary Alaniz RN   Patient is followed by Barbara Esquivel MD for ongoing prescription of pain medication.  All refills should only be approved by this provider, or covering partner.     Only occasional percocet 7.5  I do not feel she is a good candidate for chronic narcotic therapy        Controlled substance agreement:      Encounter-Level CSA:      There are no encounter-level csa.          Pain Clinic evaluation in the past: No     DIRE Total Score(s):  No flowsheet data found.     Last [facility] website verification:  04/23/19    [email or web address]       checked in past 3 months?  Yes 4/23/19   Last Written Prescription Date:  4/23/19  Last Fill Quantity: 18 tablets  # refills: 0   Last office visit: 4/9/2019 with prescribing provider:  4/9/19   Future Office Visit:  None  [place] reviewed:  not reviewed/not due - last done on 4/23/19    [place] profile:  [email or web address]          Mary Alaniz RN, BSN, PHN

## 2019-05-15 NOTE — TELEPHONE ENCOUNTER
Prescription refilled.  But I would like a urine sample left for urine tox screen before picking up prescription.  This is part of the new policy regarding chronic pain prescriptions.

## 2019-05-17 DIAGNOSIS — G89.4 CHRONIC PAIN SYNDROME: ICD-10-CM

## 2019-05-17 PROCEDURE — 80307 DRUG TEST PRSMV CHEM ANLYZR: CPT | Mod: 90 | Performed by: FAMILY MEDICINE

## 2019-05-17 NOTE — LETTER
[address]  [address]  [phone]    May 29, 2019      Jigna Amador  [address] [facility] [address]        Jigna,     Your urine drug screen continues to show marijuana - I can no longer continue to prescribe you pain medication.  I would be happy to refer you to a pain clinic if you feel this is needed.     BERTHA Esquivel M.D.

## 2019-05-27 LAB — COMPREHEN DRUG ANALYSIS UR: NORMAL

## 2019-05-28 NOTE — RESULT ENCOUNTER NOTE
Marjan Esquivel is out of the office and I am reviewing your results.    Your urine toxicologies showed marijuana.   Are you on the medical marijuana program?  Milana Amaro, PAC

## 2019-05-29 NOTE — RESULT ENCOUNTER NOTE
Please mail results and note to patient (certified):    Jigna,  Your urine drug screen continues to show marijuana - I can no longer continue to prescribe you pain medication.  I would be happy to refer you to a pain clinic if you feel this is needed.  BERTHA Esquivel M.D.

## 2019-11-01 ENCOUNTER — OFFICE VISIT (OUTPATIENT)
Dept: FAMILY MEDICINE | Facility: CLINIC | Age: 29
End: 2019-11-01
Payer: COMMERCIAL

## 2019-11-01 VITALS
HEIGHT: 66 IN | DIASTOLIC BLOOD PRESSURE: 103 MMHG | BODY MASS INDEX: 47.09 KG/M2 | TEMPERATURE: 98.2 F | WEIGHT: 293 LBS | RESPIRATION RATE: 16 BRPM | SYSTOLIC BLOOD PRESSURE: 157 MMHG | OXYGEN SATURATION: 95 % | HEART RATE: 85 BPM

## 2019-11-01 DIAGNOSIS — G89.4 CHRONIC PAIN SYNDROME: ICD-10-CM

## 2019-11-01 DIAGNOSIS — I10 HYPERTENSION GOAL BP (BLOOD PRESSURE) < 140/90: ICD-10-CM

## 2019-11-01 DIAGNOSIS — N94.6 DYSMENORRHEA: ICD-10-CM

## 2019-11-01 PROCEDURE — 99214 OFFICE O/P EST MOD 30 MIN: CPT | Performed by: FAMILY MEDICINE

## 2019-11-01 RX ORDER — METHOCARBAMOL 500 MG/1
500-1000 TABLET, FILM COATED ORAL 3 TIMES DAILY PRN
Qty: 30 TABLET | Refills: 0 | Status: SHIPPED | OUTPATIENT
Start: 2019-11-01 | End: 2020-03-02

## 2019-11-01 RX ORDER — ATENOLOL AND CHLORTHALIDONE TABLET 100; 25 MG/1; MG/1
1 TABLET ORAL DAILY
Qty: 90 TABLET | Refills: 1 | Status: SHIPPED | OUTPATIENT
Start: 2019-11-01 | End: 2019-12-26

## 2019-11-01 RX ORDER — OXYCODONE AND ACETAMINOPHEN 7.5; 325 MG/1; MG/1
1 TABLET ORAL 2 TIMES DAILY PRN
Qty: 12 TABLET | Refills: 0 | Status: SHIPPED | OUTPATIENT
Start: 2019-11-01 | End: 2019-11-11

## 2019-11-01 ASSESSMENT — PAIN SCALES - GENERAL: PAINLEVEL: WORST PAIN (10)

## 2019-11-01 ASSESSMENT — MIFFLIN-ST. JEOR: SCORE: 2195.73

## 2019-11-01 NOTE — PROGRESS NOTES
Subjective     Jigna Amador is a 29 year old female who presents to clinic today for the following health issues:    HPI   Hypertension Follow-up      Do you check your blood pressure regularly outside of the clinic? No     Are you following a low salt diet? No    Are your blood pressures ever more than 140 on the top number (systolic) OR more   than 90 on the bottom number (diastolic), for example 140/90? NA      How many servings of fruits and vegetables do you eat daily?  0-1    On average, how many sweetened beverages do you drink each day (soda, juice, sweet tea, etc)?   1 per week    How many days per week do you miss taking your medication? 0    ABDOMINAL   PAIN     Onset: 1 day    Description:   Character: stabbing  Location: pelvic region  Radiation: Flanks    Intensity: 10/10    Progression of Symptoms:  worsening    Accompanying Signs & Symptoms:  Fever/Chills?: no   Gas/Bloating: no   Nausea: no   Vomitting: no   Diarrhea?: no   Constipation:no   Dysuria or Hematuria: no    History:   Trauma: no   Previous similar pain: YES   Previous tests done: none    Precipitating factors:   Does the pain change with:     Food: no      BM: YES- can give slight relief    Urination: Yes - can give relief    Alleviating factors:  None    Therapies Tried and outcome: Tylenol, Ibuprofen - no help noted    LMP:  10/1/19     SUBJECTIVE:  Here today in follow-up of hypertension and dysmenorrhea.  The patient is well-known to me though I have not seen him in about 6 months.  Has been out of her blood pressure medication for a bit and we are not surprised to see it elevated.  While steadily taking it was in the 130s over 80s.  No issues with edema without.  We also had been using some Percocet to help deal with painful dysmenorrhea.  But I was not comfortable with routinely refilling this and the patient showed marijuana on a urine drug screen.  So I discussed that I do not want her on ongoing narcotics.  But she says her  "menstruation is very painful and I discussed that I am willing to use pain medication very judiciously as well as muscle relaxers but I strongly suggest she get in with GYN to discuss other measures, whether this be suppression or endometrial ablation or what ever.    Review of systems otherwise negative.  Past medical, family, and social history reviewed and updated in chart.    OBJECTIVE:  BP (!) 157/103 (BP Location: Right arm, Patient Position: Sitting, Cuff Size: Adult Large)   Pulse 85   Temp 98.2  F (36.8  C) (Oral)   Resp 16   Ht 1.664 m (5' 5.5\")   Wt 147.2 kg (324 lb 8 oz)   LMP 10/01/2019 (Exact Date)   SpO2 95%   BMI 53.18 kg/m    Alert, pleasant, upbeat, and in no apparent discomfort.  Morbidly obese  S1 and S2 normal, no murmurs, clicks, gallops or rubs. Regular rate and rhythm. Chest is clear; no wheezes or rales. No edema or JVD.    The abdomen is soft without tenderness, guarding, mass, rebound or organomegaly. Bowel sounds are normal. No CVA tenderness or inguinal adenopathy noted.   Past labs reviewed with the patient.     ASSESSMENT / PLAN:  (I10) Hypertension goal BP (blood pressure) < 140/90  Comment: We will get her back on her routine medication and plan to see each other in 2 to 3 months.  Will be due for lab work  Plan: atenolol-chlorthalidone (TENORETIC) 100-25 MG         tablet            (N94.6) Dysmenorrhea  Comment: Only small as needed refill closely monitored  Plan: oxyCODONE-acetaminophen (PERCOCET) 7.5-325 MG         per tablet, methocarbamol (ROBAXIN) 500 MG         tablet, OB/GYN REFERRAL        Suggest seeing GYN    (G89.4) Chronic pain syndrome  Comment:   Plan: As above    Follow up 2 to 3 months for blood pressure recheck  BERTHA Esquivel MD    (Chart documentation completed in part with Dragon voice-recognition software.  Even though reviewed some grammatical, spelling, and word errors may remain.)         "

## 2019-11-06 ENCOUNTER — MYC REFILL (OUTPATIENT)
Dept: FAMILY MEDICINE | Facility: CLINIC | Age: 29
End: 2019-11-06

## 2019-11-06 DIAGNOSIS — N94.6 DYSMENORRHEA: ICD-10-CM

## 2019-11-06 DIAGNOSIS — G89.4 CHRONIC PAIN SYNDROME: ICD-10-CM

## 2019-11-06 RX ORDER — OXYCODONE AND ACETAMINOPHEN 7.5; 325 MG/1; MG/1
1 TABLET ORAL 2 TIMES DAILY PRN
Qty: 12 TABLET | Refills: 0 | OUTPATIENT
Start: 2019-11-06

## 2019-11-06 NOTE — TELEPHONE ENCOUNTER
Requested Prescriptions   Pending Prescriptions Disp Refills     oxyCODONE-acetaminophen (PERCOCET) 7.5-325 MG per tablet 12 tablet 0     Sig: Take 1 tablet by mouth 2 times daily as needed for pain       There is no refill protocol information for this order          oxyCODONE-acetaminophen (PERCOCET) 7.5-325 MG per tablet      Last Written Prescription Date:  11/1/19  Last Fill Quantity: 12,   # refills: 0  Last Office Visit: 11/1/19  Future Office visit:       Routing refill request to provider for review/approval because:  Drug not on the [facility], [facility] or [facility] refill protocol or controlled substance

## 2019-11-07 NOTE — TELEPHONE ENCOUNTER
Controlled Substance Refill Request for Percocet  Problem List Complete:  Yes  Chronic pain syndrome   Problem Detail     Noted:  8/3/2018   Priority:  Medium   Overview Addendum 11/6/2019  6:02 PM by Mary Alaniz RN   Patient is followed by Barbara Esquivel MD for ongoing prescription of pain medication.  All refills should only be approved by this provider, or covering partner.     Multiple failed UDS ([facility]C) - no scheduled narcotics, close follow up on as needed usage       last checked: 11/06/19     checked in past 3 months?  Yes 11/6/19   Last Written Prescription Date:  11/1/19  Last Fill Quantity: 12 tablets  # refills: 0   Last office visit: 11/1/2019 with prescribing provider:  11/1/19   Future Office Visit:    [place] reviewed:  reviewed- no concerns    [place]:  [email or web address]        Mary Alaniz RN, BSN, PHN

## 2019-11-07 NOTE — TELEPHONE ENCOUNTER
Just filled prescription on 11/1/19 and note states limited supply and monitor use.   Prescription refill declined. Sent [employer] message to patient.

## 2019-11-08 ENCOUNTER — TELEPHONE (OUTPATIENT)
Dept: FAMILY MEDICINE | Facility: CLINIC | Age: 29
End: 2019-11-08

## 2019-11-08 NOTE — TELEPHONE ENCOUNTER
From: Jeff Hannon   Sent: 11/7/2019  11:09 AM CST   To: [facility]   Subject: FW: My medicine                                    Dr. Esquivel patient sending procedural question.  Please advise.   ----- Message -----   From: Jigna Amador   Sent: 11/6/2019  11:51 AM CST   To: [facility]   Subject: My medicine                                        Topic: Procedural Question      Can I have a water pill my ankles haven't went down     Need to call and verify what is needed.    Carrie Bennett RN, [facility]     no

## 2019-11-08 NOTE — TELEPHONE ENCOUNTER
This writer attempted to contact p on 11/08/19      Reason for call more information and left message.      If patient calls back:   Registered Nurse called. Follow Triage Call workflow        Cassidy Jones RN

## 2019-11-11 NOTE — TELEPHONE ENCOUNTER
This writer attempted to contact patient on 11/11/19      Reason for call additional information needed and left message.      If patient calls back:   Registered Nurse called. Follow Triage Call workflow        Breana Dean RN

## 2019-11-12 DIAGNOSIS — G89.4 CHRONIC PAIN SYNDROME: ICD-10-CM

## 2019-11-12 PROCEDURE — 99000 SPECIMEN HANDLING OFFICE-LAB: CPT | Performed by: FAMILY MEDICINE

## 2019-11-12 PROCEDURE — 80307 DRUG TEST PRSMV CHEM ANLYZR: CPT | Mod: 90 | Performed by: FAMILY MEDICINE

## 2019-11-12 NOTE — TELEPHONE ENCOUNTER
(3rd attempt) This writer attempted to contact patient on 11/12/19      Reason for call discuss sc and left message.      If patient calls back:   Registered Nurse called. Follow Triage Call workflow    Message routed to xiomara to advise on how sending out letter.     Breana Dean RN

## 2019-11-16 LAB — COMPREHEN DRUG ANALYSIS UR: NORMAL

## 2019-11-18 NOTE — RESULT ENCOUNTER NOTE
Jigna,  Your urine screen was normal and as expected - showing the medication as prescribed.  As part of [facility]'s new monitoring policy we will periodically recheck this test.  BETRHA Esquivel M.D.

## 2019-11-23 ENCOUNTER — MYC REFILL (OUTPATIENT)
Dept: FAMILY MEDICINE | Facility: CLINIC | Age: 29
End: 2019-11-23

## 2019-11-23 DIAGNOSIS — N94.6 DYSMENORRHEA: ICD-10-CM

## 2019-11-25 NOTE — TELEPHONE ENCOUNTER
Requested Prescriptions   Pending Prescriptions Disp Refills     oxyCODONE-acetaminophen (PERCOCET) 7.5-325 MG per tablet 12 tablet 0     Sig: Take 1 tablet by mouth 2 times daily as needed for pain       There is no refill protocol information for this order          oxyCODONE-acetaminophen (PERCOCET) 7.5-325 MG per tablet      Last Written Prescription Date:  11/12/19  Last Fill Quantity: 12,   # refills: 0  Last Office Visit: 11/1/19  Future Office visit:       Routing refill request to provider for review/approval because:  Drug not on the [facility], [facility] or [facility] refill protocol or controlled substance

## 2019-11-26 RX ORDER — OXYCODONE AND ACETAMINOPHEN 7.5; 325 MG/1; MG/1
1 TABLET ORAL 2 TIMES DAILY PRN
Qty: 12 TABLET | Refills: 0 | OUTPATIENT
Start: 2019-11-26

## 2019-11-26 NOTE — TELEPHONE ENCOUNTER
Controlled Substance Refill Request for oxyCODONE-acetaminophen (PERCOCET) 7.5-325 MG per tablet    Problem List Complete:  Yes  Patient is followed by Barbara Esquivel MD for ongoing prescription of pain medication.  All refills should only be approved by this provider, or covering partner.     Multiple failed UDS ([facility]C) - no scheduled narcotics, close follow up on as needed usage       last checked: 11/06/19     Last Urine Drug Screen: No results found for: Abelardo JURADO Drug Analysis [facility]   Date Value Ref Range Status   11/12/2019 FINAL  Final     Comment:     (Note)  ====================================================================  COMPREHENSIVE DRUG ANALYSIS,UR  ====================================================================  Test                             Result       Flag       Units         NO DRUGS DETECTED.  ====================================================================  Test                      Result    Flag   Units      Ref Range        Creatinine              65               mg/dL      >=20            ====================================================================  For clinical consultation, please call [phone].  ====================================================================  Analysis performed by "[employer] [place] [address]     , No results found for: THC13, PCP13, COC13, MAMP13, OPI13, AMP13, BZO13, TCA13, MTD13, BAR13, OXY13, PPX13, BUP13      checked in past 3 months?  Yes 11/6/19      Routing refill request to provider for review/approval because:  Drug not on the [facility] refill protocol   Yesenia Braun RN  [facility]

## 2019-11-29 ENCOUNTER — MYC REFILL (OUTPATIENT)
Dept: FAMILY MEDICINE | Facility: CLINIC | Age: 29
End: 2019-11-29

## 2019-11-29 DIAGNOSIS — N94.6 DYSMENORRHEA: ICD-10-CM

## 2019-12-03 RX ORDER — OXYCODONE AND ACETAMINOPHEN 7.5; 325 MG/1; MG/1
1 TABLET ORAL 2 TIMES DAILY PRN
Qty: 12 TABLET | Refills: 0 | Status: SHIPPED | OUTPATIENT
Start: 2019-12-03 | End: 2019-12-24

## 2019-12-03 NOTE — TELEPHONE ENCOUNTER
Routing refill request to provider for review/approval because:  Provider to review. Last Rx was denied.    Carrie Bennett RN, [facility] Triage

## 2019-12-24 ENCOUNTER — MYC REFILL (OUTPATIENT)
Dept: FAMILY MEDICINE | Facility: CLINIC | Age: 29
End: 2019-12-24

## 2019-12-24 DIAGNOSIS — N94.6 DYSMENORRHEA: ICD-10-CM

## 2019-12-24 DIAGNOSIS — I10 HYPERTENSION GOAL BP (BLOOD PRESSURE) < 140/90: ICD-10-CM

## 2019-12-24 RX ORDER — ATENOLOL AND CHLORTHALIDONE TABLET 100; 25 MG/1; MG/1
1 TABLET ORAL DAILY
Qty: 90 TABLET | Refills: 1 | Status: CANCELLED | OUTPATIENT
Start: 2019-12-24

## 2019-12-26 NOTE — TELEPHONE ENCOUNTER
"Requested Prescriptions   Pending Prescriptions Disp Refills     atenolol-chlorthalidone (TENORETIC) 100-25 MG tablet 90 tablet 1     Sig: Take 1 tablet by mouth daily       Beta-Blockers Protocol Failed - 12/26/2019  6:25 AM        Failed - Blood pressure under 140/90 in past 12 months     BP Readings from Last 3 Encounters:   11/01/19 (!) 157/103   04/09/19 132/80   03/19/19 138/88                 Passed - Patient is age 6 or older        Passed - Recent (12 mo) or future (30 days) visit within the authorizing provider's specialty     Patient has had an office visit with the authorizing provider or a provider within the authorizing providers department within the previous 12 mos or has a future within next 30 days. See \"Patient Info\" tab in inbasket, or \"Choose Columns\" in Meds & Orders section of the refill encounter.              Passed - Medication is active on med list        oxyCODONE-acetaminophen (PERCOCET) 7.5-325 MG per tablet 12 tablet 0     Sig: Take 1 tablet by mouth 2 times daily as needed for pain       There is no refill protocol information for this order      atenolol-chlorthalidone (TENORETIC) 100-25 MG tablet  Last Written Prescription Date:  11/1/19  Last Fill Quantity: 90,  # refills: 1   Last office visit: 11/1/2019 with prescribing provider:  Dr. Esquivel   Future Office Visit:        oxyCODONE-acetaminophen (PERCOCET) 7.5-325 MG per tablet     Last Written Prescription Date:  12/3/19  Last Fill Quantity: 12,   # refills: 0  Last Office Visit: 11/1/19  Future Office visit:       Routing refill request to provider for review/approval because:  Drug not on the [facility], [facility] or [facility] refill protocol or controlled substance    "

## 2019-12-27 NOTE — TELEPHONE ENCOUNTER
Too soon to request Tenoretic. Six month supply sent 11/1/19.     Controlled Substance Refill Request for oxyCODONE-acetaminophen (PERCOCET) 7.5-325 MG   Problem List Complete:  Yes  Overview Addendum 11/6/2019  6:02 PM by Mary Alaniz RN   Patient is followed by Barbara Esquivel MD for ongoing prescription of pain medication.  All refills should only be approved by this provider, or covering partner.     Multiple failed UDS ([facility]C) - no scheduled narcotics, close follow up on as needed usage       last checked: 11/06/19           checked in past 3 months?  Yes 11/6/19  Routing refill request to provider for review/approval because:  Drug not on the [facility] refill protocol   Yesenia Braun RN  [facility]

## 2019-12-28 RX ORDER — OXYCODONE AND ACETAMINOPHEN 7.5; 325 MG/1; MG/1
1 TABLET ORAL 2 TIMES DAILY PRN
Qty: 12 TABLET | Refills: 0 | Status: SHIPPED | OUTPATIENT
Start: 2019-12-28 | End: 2020-01-28

## 2020-01-28 ENCOUNTER — MYC REFILL (OUTPATIENT)
Dept: FAMILY MEDICINE | Facility: CLINIC | Age: 30
End: 2020-01-28

## 2020-01-28 DIAGNOSIS — I10 HYPERTENSION GOAL BP (BLOOD PRESSURE) < 140/90: ICD-10-CM

## 2020-01-28 DIAGNOSIS — N94.6 DYSMENORRHEA: ICD-10-CM

## 2020-01-29 NOTE — TELEPHONE ENCOUNTER
"Requested Prescriptions   Pending Prescriptions Disp Refills     atenolol-chlorthalidone (TENORETIC) 100-25 MG tablet 30 tablet 0     Sig: Take 1 tablet by mouth daily       Beta-Blockers Protocol Failed - 1/28/2020  6:38 PM        Failed - Blood pressure under 140/90 in past 12 months     BP Readings from Last 3 Encounters:   11/01/19 (!) 157/103   04/09/19 132/80   03/19/19 138/88                 Passed - Patient is age 6 or older        Passed - Recent (12 mo) or future (30 days) visit within the authorizing provider's specialty     Patient has had an office visit with the authorizing provider or a provider within the authorizing providers department within the previous 12 mos or has a future within next 30 days. See \"Patient Info\" tab in inbasket, or \"Choose Columns\" in Meds & Orders section of the refill encounter.              Passed - Medication is active on med list          "

## 2020-01-29 NOTE — TELEPHONE ENCOUNTER
Requested Prescriptions   Pending Prescriptions Disp Refills     oxyCODONE-acetaminophen (PERCOCET) 7.5-325 MG per tablet      Last Written Prescription Date:  12/28/19  Last Fill Quantity: 12 tablet,   # refills: 0  Last Office Visit: Dr. Esquivel  Future Office visit:       Routing refill request to provider for review/approval because:  Drug not on the [facility], P or [facility] refill protocol or controlled substance   12 tablet 0     Sig: Take 1 tablet by mouth 2 times daily as needed for pain       There is no refill protocol information for this order

## 2020-01-30 RX ORDER — OXYCODONE AND ACETAMINOPHEN 7.5; 325 MG/1; MG/1
1 TABLET ORAL 2 TIMES DAILY PRN
Qty: 12 TABLET | Refills: 0 | Status: SHIPPED | OUTPATIENT
Start: 2020-01-30 | End: 2020-03-02

## 2020-01-30 RX ORDER — ATENOLOL AND CHLORTHALIDONE TABLET 100; 25 MG/1; MG/1
1 TABLET ORAL DAILY
Qty: 30 TABLET | Refills: 0 | Status: SHIPPED | OUTPATIENT
Start: 2020-01-30 | End: 2020-03-02

## 2020-01-30 NOTE — TELEPHONE ENCOUNTER
Routing refill request to provider for review/approval because:  BP fails [facility] refill protocol    Breana Edwards RN  [place]

## 2020-01-30 NOTE — TELEPHONE ENCOUNTER
Controlled Substance Refill Request for Percocet  Problem List Complete:  Yes  Problem Detail     Noted:  8/3/2018   Priority:  Medium   Overview Addendum 11/6/2019  6:02 PM by Mary Alaniz RN   Patient is followed by Barbara Esquivel MD for ongoing prescription of pain medication.  All refills should only be approved by this provider, or covering partner.     Multiple failed UDS ([facility]C) - no scheduled narcotics, close follow up on as needed usage       last checked: 11/06/19       checked in past 3 months?  Yes 11/6/19  Last Written Prescription Date:  12/28/19  Last Fill Quantity: 12 tablets  # refills: 0   Last office visit: 11/1/2019 with prescribing provider:  11/1/19   Future Office Visit:   Next 5 appointments (look out 90 days)    Feb 12, 2020  1:20 PM CST  Office Visit with Barbara Esquivel MD  [facility] [address] [place] [phone]  [phone]         [place] reviewed:  not reviewed/not due - last done on 11/6/19    [place]:  [email or web address]          Mary Alaniz RN, BSN, PHN

## 2020-03-02 ENCOUNTER — OFFICE VISIT (OUTPATIENT)
Dept: FAMILY MEDICINE | Facility: CLINIC | Age: 30
End: 2020-03-02
Payer: MEDICAID

## 2020-03-02 VITALS
OXYGEN SATURATION: 100 % | DIASTOLIC BLOOD PRESSURE: 86 MMHG | TEMPERATURE: 98.4 F | HEIGHT: 66 IN | HEART RATE: 106 BPM | BODY MASS INDEX: 47.09 KG/M2 | RESPIRATION RATE: 20 BRPM | SYSTOLIC BLOOD PRESSURE: 150 MMHG | WEIGHT: 293 LBS

## 2020-03-02 DIAGNOSIS — E66.01 MORBID OBESITY WITH BMI OF 50.0-59.9, ADULT (H): ICD-10-CM

## 2020-03-02 DIAGNOSIS — I10 HYPERTENSION GOAL BP (BLOOD PRESSURE) < 140/90: ICD-10-CM

## 2020-03-02 DIAGNOSIS — J02.9 ACUTE PHARYNGITIS, UNSPECIFIED ETIOLOGY: Primary | ICD-10-CM

## 2020-03-02 DIAGNOSIS — N94.6 DYSMENORRHEA: ICD-10-CM

## 2020-03-02 DIAGNOSIS — G89.4 CHRONIC PAIN SYNDROME: ICD-10-CM

## 2020-03-02 LAB
DEPRECATED S PYO AG THROAT QL EIA: NEGATIVE
SPECIMEN SOURCE: NORMAL
SPECIMEN SOURCE: NORMAL
STREP GROUP A PCR: NOT DETECTED

## 2020-03-02 PROCEDURE — 40001204 ZZHCL STATISTIC STREP A RAPID: Performed by: FAMILY MEDICINE

## 2020-03-02 PROCEDURE — 99214 OFFICE O/P EST MOD 30 MIN: CPT | Performed by: FAMILY MEDICINE

## 2020-03-02 PROCEDURE — 87651 STREP A DNA AMP PROBE: CPT | Performed by: FAMILY MEDICINE

## 2020-03-02 RX ORDER — LISINOPRIL 10 MG/1
10 TABLET ORAL DAILY
Qty: 90 TABLET | Refills: 0 | Status: SHIPPED | OUTPATIENT
Start: 2020-03-02 | End: 2020-04-28

## 2020-03-02 RX ORDER — CHLORTHALIDONE 25 MG/1
25 TABLET ORAL DAILY
Qty: 90 TABLET | Refills: 0 | Status: SHIPPED | OUTPATIENT
Start: 2020-03-02 | End: 2020-04-28

## 2020-03-02 RX ORDER — OXYCODONE AND ACETAMINOPHEN 7.5; 325 MG/1; MG/1
1 TABLET ORAL 2 TIMES DAILY PRN
Qty: 12 TABLET | Refills: 0 | Status: SHIPPED | OUTPATIENT
Start: 2020-03-02 | End: 2020-04-14

## 2020-03-02 ASSESSMENT — ANXIETY QUESTIONNAIRES
GAD7 TOTAL SCORE: 10
IF YOU CHECKED OFF ANY PROBLEMS ON THIS QUESTIONNAIRE, HOW DIFFICULT HAVE THESE PROBLEMS MADE IT FOR YOU TO DO YOUR WORK, TAKE CARE OF THINGS AT HOME, OR GET ALONG WITH OTHER PEOPLE: NOT DIFFICULT AT ALL
2. NOT BEING ABLE TO STOP OR CONTROL WORRYING: MORE THAN HALF THE DAYS
6. BECOMING EASILY ANNOYED OR IRRITABLE: SEVERAL DAYS
1. FEELING NERVOUS, ANXIOUS, OR ON EDGE: SEVERAL DAYS
7. FEELING AFRAID AS IF SOMETHING AWFUL MIGHT HAPPEN: SEVERAL DAYS
3. WORRYING TOO MUCH ABOUT DIFFERENT THINGS: NEARLY EVERY DAY
5. BEING SO RESTLESS THAT IT IS HARD TO SIT STILL: SEVERAL DAYS

## 2020-03-02 ASSESSMENT — PATIENT HEALTH QUESTIONNAIRE - PHQ9
SUM OF ALL RESPONSES TO PHQ QUESTIONS 1-9: 12
5. POOR APPETITE OR OVEREATING: SEVERAL DAYS

## 2020-03-02 ASSESSMENT — MIFFLIN-ST. JEOR: SCORE: 2202.54

## 2020-03-02 ASSESSMENT — PAIN SCALES - GENERAL: PAINLEVEL: WORST PAIN (10)

## 2020-03-02 NOTE — LETTER
[facility]  03/02/20    Patient: Jigna Amador  YOB: 1990  Medical Record Number: [id number]                                                                  Opioid / Opioid Plus Controlled Substance Agreement    I understand that my care provider has prescribed an opioid (narcotic) controlled substance to help manage my condition(s). I am taking this medicine to help me function or work. I know this is strong medicine, and that it can cause serious side effects. Opioid medicine can be sedating, addicting and may cause a dependency on the drug. They can affect my ability to drive or think, and cause depression. They need to be taken exactly as prescribed. Combining opioids with certain medicines or chemicals (such as cocaine, sedatives and tranquilizers, sleeping pills, meth) can be dangerous or even fatal. Also, if I stop opioids suddenly, I may have severe withdrawal symptoms. Last, I understand that opioids do not work for all types of pain nor for all patients. If not helpful, I may be asked to stop them.        The risks, benefits, and side effects of these medicine(s) were explained to me. I agree that:    1. I will take part in other treatments as advised by my care team. This may be psychiatry or counseling, physical therapy, behavioral therapy, group treatment or a referral to a pain clinic. I will reduce or stop my medicine when my care team tells me to do so.  2. I will take my medicines as prescribed. I will not change the dose or schedule unless my care team tells me to. There will be no refills if I  run out early.   I may be contactedwithout warning and asked to complete a urine drug test or pill count at any time.   3. I will keep all my appointments, and understand this is part of the monitoring of opioids. My care team may require an office visit for EVERY opioid/controlled substance refill. If I miss appointments or don t follow instructions, my care team may stop my  medicine.  4. I will not ask other providers to prescribe controlled substances, and I will not accept controlled substances from other people. If I need another prescribed controlled substance for a new reason, I will tell my care team within 1 business day.  5. I will use one pharmacy to fill all of my controlled substance prescriptions, and it is up to me to make sure that I do not run out of my medicines on weekends or holidays. If my care team is willing to refill my opioid prescription without a visit, I must request refills only during office hours, refills may take up to 3 days to process, and it may take up to 5 to 7 days for my medicine to be mailed and ready at my pharmacy. Prescriptions will not be mailed anywhere except my pharmacy.        [id number]  Rev 12/18         Registration to scan to EHR                             Page 1 of 2               Controlled Substance Agreement Opioid        [facility]  03/02/20  Patient: Jigna Amador  YOB: 1990  Medical Record Number: [id number]                                                                  6. I am responsible for my prescriptions. If the medicine/prescription is lost or stolen, it will not be replaced. I also agree not to share controlled substance medicines with anyone.  7. I agree to not use ANY illegal or recreational drugs. This includes marijuana, cocaine, bath salts or other drugs. I agree not to use alcohol unless my care team says I may.          I agree to give urine samples whenever asked. If I don t give a urine sample, the care team may stop my medicine.    8. If I enroll in the [place] Medical Marijuana program, I will tell my care team. I will also sign an agreement to share my medical records with my care team.   9. I will bring in my list of medicines (or my medicine bottles) each time I come to the clinic.   10. I will tell my care team right away if I become pregnant or have a new medical problem treated  outside of my regular clinic.  11. I understand that this medicine can affect my thinking and judgment. It may be unsafe for me to drive, use machinery and do dangerous tasks. I will not do any of these things until I know how the medicine affects me. If my dose changes, I will wait to see how it affects me. I will contact my care team if I have concerns about medicine side effects.    I understand that if I do not follow any of the conditions above, my prescriptions or treatment may be stopped.      I agree that my provider, clinic care team, and pharmacy may work with any city, state or federal law enforcement agency that investigates the misuse, sale, or other diversion of my controlled medicine. I will allow my provider to discuss my care with or share a copy of this agreement with any other treating provider, pharmacy or emergency room where I receive care. I agree to give up (waive) any right of privacy or confidentiality with respect to these consents.     I have read this agreement and have asked questions about anything I did not understand.      ________________________________________________________________________  Patient signature - Date/Time -  Jigna Amador                                      ________________________________________________________________________  Witness signature                                                            ________________________________________________________________________  Provider signature - Barbara Esquivel MD      [id number]  Rev 12/18         Registration to scan to EHR                         Page 2 of 2                   Controlled Substance Agreement Opioid           Page 1 of 2  Opioid Pain Medicines (also known as Narcotics)  What You Need to Know    What are opioids?   Opioids are pain medicines that must be prescribed by a doctor.  They are also known as narcotics.    Examples are:     morphine (MS Contin, Sara)    oxycodone  (Oxycontin)    oxycodone and acetaminophen (Percocet)    hydrocodone and acetaminophen (Vicodin, Norco)     fentanyl patch (Duragesic)     hydromorphone (Dilaudid)     methadone     What do opioids do well?   Opioids are best for short-term pain after a surgery or injury. They also work well for cancer pain. Unlike other pain medicines, they do not cause liver or kidney failure or ulcers. They may help some people with long-lasting (chronic) pain.     What do opioids NOT do well?   Opioids never get rid of pain entirely, and they do not work well for most patients with chronic pain. Opioids do not reduce swelling, one of the causes of pain. They also don t work well for nerve pain.                           For informational purposes only.  Not to replace the advice of your care provider.  Copyright 201 [facility]. All right reserved. [employer] 815626-[employer] 02/18.      Page 2 of 2    Risks and side effects   Talk to your doctor before you start or decide to keep taking one of these medicines. Side effects include:    Lowering your breathing rate enough to cause death    Overdose, including death, especially if taking higher than prescribed doses    Long-term opioid use    Worse depression symptoms; less pleasure in things you usually enjoy    Feeling tired or sluggish    Slower thoughts or cloudy thinking    Being more sensitive to pain over time; pain is harder to control    Trouble sleeping or restless sleep    Changes in hormone levels (for example, less testosterone)    Changes in sex drive or ability to have sex    Constipation    Unsafe driving    Itching and sweating    Feeling dizzy    Nausea, vomiting and dry mouth    What else should I know about opioids?  When someone takes opioids for too long or too often, they become dependent. This means that if you stop or reduce the medicine too quickly, you will have withdrawal symptoms.    Dependence is not the same as addiction. Addiction is when  people keep using a substance that harms their body, their mind or their relations with others. If you have a history of drug or alcohol abuse, taking opioids can cause a relapse.    Over time, opioids don t work as well. Most people will need higher and higher doses. The higher the dose, the more serious the side effects. We don t know the long-term effects of opioids.      Prescribed opioids aren't the best way to manage chronic pain    Other ways to manage pain include:      Ibuprofen or acetaminophen.  You should always try this first.      Treat health problems that may be causing pain.      acupuncture or massage, deep breathing, meditation, visual imagery, aromatherapy.      Use heat or ice at the pain site      Physical therapy and exercise      Stop smoking      See a counselor or therapist                                                  People who have used opioids for a long time may have a lower quality of life, worse depression, higher levels of pain and more visits to doctors.    Never share your opioids with others. Be sure to store opioids in a secure place, locked if possible.Young children can easily swallow them and overdose.     You can overdose on opioids.  Signs of overdose include decrease or loss of consciousness, slowed breathing, trouble waking and blue lips.  If someone is worried about overdose, they should call 911.    If you are at risk for overdose, you may get naloxone (Narcan, a medicine that reverses the effects of opioids.  If you overdose, a friend or family member can give you Narcan while waiting for the ambulance.  They need to know the signs of overdose and how to give Narcan.    While you're taking opioids:    Don't use alcohol or street drugs. Taking them together can cause death.    Don't take any of these medicines unless your doctor says its okay.  Taking these with opioids can cause death.    Benzodiazepines (such as lorazepam         or diazepam)    Muscle relaxers  (such as cyclobenzaprine)    sleeping pills    other opioids    Safe disposal of opioids  Find your area drug take-back program, your pharmacy mail-back program, buy a special disposal bag (such as Deterra) from your pharmacy or flush them down the toilet.  Use the guidelines at:  www.fda.gov/drugs/resourcesforyou

## 2020-03-02 NOTE — PROGRESS NOTES
Subjective     Jigna Amador is a 29 year old female who presents to clinic today for the following health issues:    HPI   Hypertension Follow-up      Do you check your blood pressure regularly outside of the clinic? No     Are you following a low salt diet? No    Are your blood pressures ever more than 140 on the top number (systolic) OR more   than 90 on the bottom number (diastolic), for example 140/90? Yes      How many servings of fruits and vegetables do you eat daily?  0-1    On average, how many sweetened beverages do you drink each day (Examples: soda, juice, sweet tea, etc.  Do NOT count diet or artificially sweetened beverages)?   0    How many days per week do you exercise enough to make your heart beat faster? none    How many minutes a day do you exercise enough to make your heart beat faster? non    How many days per week do you miss taking your medication? Currently not taking HTN medcication    SUBJECTIVE:  Here today with ongoing sore throat symptoms have been present for over a month.  Was seen at an outside urgent care a couple of months ago and diagnosed with strep and given a course of Augmentin.  Was definitely feeling better but as soon as this finished symptoms came back.  Seems to be on and off but getting worse recently.  No fevers or chills.  No upper respiratory symptoms.  Just the sore throat.  Patient well-known to me for history of hypertension and morbid obesity.  We have had her blood pressure under reasonable control when on therapy but she tends to run out without proper rechecks.  Some of the medications in the past have caused significant swelling and some side aches, this most likely seems to be amlodipine.  Also has a history of dysmenorrhea for which we have used some pain medication but I do not feel comfortable having the patient on frequent daily use medication.  Discussed only intermittent and small doses.  But it is frequent enough that it warrants monitoring our chronic  "pain care package    Review of systems otherwise negative.  Past medical, family, and social history reviewed and updated in chart.    OBJECTIVE:  BP (!) 150/86   Pulse 106   Temp 98.4  F (36.9  C) (Oral)   Resp 20   Ht 1.664 m (5' 5.5\")   Wt 147.9 kg (326 lb)   LMP 02/28/2020 (Approximate)   SpO2 100%   Breastfeeding No   BMI 53.42 kg/m    Alert, pleasant, upbeat, and in no apparent discomfort.  Ears clear bilaterally  Oropharynx mildly erythematous  Neck supple no lymphadenopathy  S1 and S2 normal, no murmurs, clicks, gallops or rubs. Regular rate and rhythm. Chest is clear; no wheezes or rales. No edema or JVD.   Past labs reviewed with the patient.  QS negative     ASSESSMENT / PLAN:  (J02.9) Acute pharyngitis, unspecified etiology  (primary encounter diagnosis)  Comment: We will go ahead and treat for flu 2 weeks and see how she does  Plan: Streptococcus A Rapid Scr w Reflx to PCR, Group        A Streptococcus PCR Throat Swab,         amoxicillin-clavulanate (AUGMENTIN) 875-125 MG         tablet        Discussed mechanism of action of the proposed medication, as well as potential effects, both good and bad.  Patient expressed understanding and agreed with treatment.     (I10) Hypertension goal BP (blood pressure) < 140/90  Comment: Off therapy currently and I think we are running into trouble with the combination pills knowing which 1 is working in which 1 is not.  So we will start over with a low-dose of lisinopril and chlorthalidone.  Plan recheck in 3 to 4 weeks and can simply double the dosages if needed  Plan: chlorthalidone (HYGROTON) 25 MG tablet,         lisinopril (ZESTRIL) 10 MG tablet            (E66.01,  Z68.43) Morbid obesity with BMI of 50.0-59.9, adult (H)  Comment: Lifestyle management discussed  Plan:     (N94.6) Dysmenorrhea  Comment: Controlled substance agreement signed today and urine drug screen up-to-date.  [facility] database monitored  Plan: oxyCODONE-acetaminophen (PERCOCET) 7.5-325 " MG         per tablet            Follow up 3 to 4 weeks for blood pressure recheck  BERTHA Esquivel MD    (Chart documentation completed in part with Dragon voice-recognition software.  Even though reviewed some grammatical, spelling, and word errors may remain.)

## 2020-03-03 ASSESSMENT — ANXIETY QUESTIONNAIRES: GAD7 TOTAL SCORE: 10

## 2020-03-10 ENCOUNTER — HEALTH MAINTENANCE LETTER (OUTPATIENT)
Age: 30
End: 2020-03-10

## 2020-04-13 ENCOUNTER — MYC REFILL (OUTPATIENT)
Dept: FAMILY MEDICINE | Facility: CLINIC | Age: 30
End: 2020-04-13

## 2020-04-13 DIAGNOSIS — N94.6 DYSMENORRHEA: ICD-10-CM

## 2020-04-14 ENCOUNTER — MYC REFILL (OUTPATIENT)
Dept: FAMILY MEDICINE | Facility: CLINIC | Age: 30
End: 2020-04-14

## 2020-04-14 DIAGNOSIS — N94.6 DYSMENORRHEA: ICD-10-CM

## 2020-04-14 NOTE — TELEPHONE ENCOUNTER
Requested Prescriptions   Pending Prescriptions Disp Refills     oxyCODONE-acetaminophen (PERCOCET) 7.5-325 MG per tablet        Last Written Prescription Date:  3/2/20  Last Fill Quantity: 12 tablet,   # refills: 0  Last Office Visit: Dr. Esquivel  Future Office visit:       Routing refill request to provider for review/approval because:  Drug not on the [facility], P or [facility] refill protocol or controlled substance   12 tablet 0     Sig: Take 1 tablet by mouth 2 times daily as needed for pain Needs to be seen for any further refill.       There is no refill protocol information for this order

## 2020-04-14 NOTE — TELEPHONE ENCOUNTER
Requested Prescriptions   Pending Prescriptions Disp Refills     oxyCODONE-acetaminophen (PERCOCET) 7.5-325 MG per tablet      Last Written Prescription Date:  3/2/20  Last Fill Quantity: 12 tablet,   # refills: 0  Last Office Visit: Dr. Esquivel  Future Office visit:       Routing refill request to provider for review/approval because:  Drug not on the [facility], P or [facility] refill protocol or controlled substance   12 tablet 0     Sig: Take 1 tablet by mouth 2 times daily as needed for pain Needs to be seen for any further refill.       There is no refill protocol information for this order

## 2020-04-16 RX ORDER — OXYCODONE AND ACETAMINOPHEN 7.5; 325 MG/1; MG/1
1 TABLET ORAL 2 TIMES DAILY PRN
Qty: 12 TABLET | Refills: 0 | Status: CANCELLED | OUTPATIENT
Start: 2020-04-16

## 2020-04-16 NOTE — TELEPHONE ENCOUNTER
Controlled Substance Refill Request for Percocet  Problem List Complete:  Yes  Chronic pain syndrome   Problem Detail     Noted:  8/3/2018    Priority:  Medium    Overview Addendum 3/2/2020  4:15 PM by Barbara Esquivel MD    Patient is followed by Barbara Esquivel MD for ongoing prescription of pain medication.  All refills should only be approved by this provider, or covering partner.     Medication(s): percocet.   Maximum quantity per month: 12-24  Clinic visit frequency required: Q 3 months      Controlled substance agreement:  3/2/2020  Encounter-Level CSA:    There are no encounter-level csa.      Patient-Level CSA:    There are no patient-level csa.         UDS - multiple positive for THC -not necessarily disqualifying, but bears monitor     Pain Clinic evaluation in the past: No     DIRE Total Score(s):  No flowsheet data found.     Last [facility] website verification:  done on 3/2/2020   [email or web address]      checked in past 3 months?  Yes 3/2/20   Last Written Prescription Date:  3/2/2020  Last Fill Quantity: 12 tablets  # refills: 0   Last office visit: 3/2/2020 with prescribing provider:  3/2/20   Future Office Visit:    RX [place] reviewed:  not reviewed/not due - last done on 3/2/20    [place]:  [email or web address]        Mary Alaniz RN, BSN, PHN

## 2020-04-17 RX ORDER — OXYCODONE AND ACETAMINOPHEN 7.5; 325 MG/1; MG/1
1 TABLET ORAL 2 TIMES DAILY PRN
Qty: 12 TABLET | Refills: 0 | Status: SHIPPED | OUTPATIENT
Start: 2020-04-17 | End: 2020-04-28

## 2020-04-17 NOTE — TELEPHONE ENCOUNTER
Controlled Substance Refill Request for Percocet  Problem List Complete:  Yes  Chronic pain syndrome   Problem Detail      Noted:  8/3/2018    Priority:  Medium    Overview Addendum 3/2/2020  4:15 PM by Barbara Esquivel MD    Patient is followed by Barbara Esquivel MD for ongoing prescription of pain medication.  All refills should only be approved by this provider, or covering partner.     Medication(s): percocet.   Maximum quantity per month: 12-24  Clinic visit frequency required: Q 3 months      Controlled substance agreement:  3/2/2020  Encounter-Level CSA:    There are no encounter-level csa.      Patient-Level CSA:    There are no patient-level csa.         UDS - multiple positive for THC -not necessarily disqualifying, but bears monitor     Pain Clinic evaluation in the past: No     DIRE Total Score(s):  No flowsheet data found.     Last [facility] website verification:  done on 3/2/2020   [email or web address]       checked in past 3 months?  Yes 3/2/20   Last Written Prescription Date:  3/2/2020  Last Fill Quantity: 12 tablets  # refills: 0   Last office visit: 3/2/2020 with prescribing provider:  3/2/20   Future Office Visit:    RX monitoring program [place] reviewed:  not reviewed/not due - last done on 3/2/20     [place]:  [email or web address]           Mary Alaniz RN, BSN, PHN

## 2020-04-28 ENCOUNTER — MYC REFILL (OUTPATIENT)
Dept: FAMILY MEDICINE | Facility: CLINIC | Age: 30
End: 2020-04-28

## 2020-04-28 DIAGNOSIS — N94.6 DYSMENORRHEA: ICD-10-CM

## 2020-04-28 DIAGNOSIS — I10 HYPERTENSION GOAL BP (BLOOD PRESSURE) < 140/90: ICD-10-CM

## 2020-05-01 NOTE — TELEPHONE ENCOUNTER
"Requested Prescriptions   Pending Prescriptions Disp Refills     chlorthalidone (HYGROTON) 25 MG tablet 90 tablet 0     Sig: Take 1 tablet (25 mg) by mouth daily       Diuretics (Including Combos) Protocol Failed - 5/1/2020  3:01 PM        Failed - Blood pressure under 140/90 in past 12 months     BP Readings from Last 3 Encounters:   03/02/20 (!) 150/86   11/01/19 (!) 157/103   04/09/19 132/80                 Failed - Normal serum creatinine on file in past 12 months     Recent Labs   Lab Test 04/09/19  1752   CR 0.70              Failed - Normal serum potassium on file in past 12 months     Recent Labs   Lab Test 04/09/19  1752   POTASSIUM 4.4                    Failed - Normal serum sodium on file in past 12 months     Recent Labs   Lab Test 04/09/19  1752                 Passed - Recent (12 mo) or future (30 days) visit within the authorizing provider's specialty     Patient has had an office visit with the authorizing provider or a provider within the authorizing providers department within the previous 12 mos or has a future within next 30 days. See \"Patient Info\" tab in inbasket, or \"Choose Columns\" in Meds & Orders section of the refill encounter.              Passed - Medication is active on med list        Passed - Patient is age 18 or older        Passed - No active pregancy on record        Passed - No positive pregnancy test in past 12 months           lisinopril (ZESTRIL) 10 MG tablet 90 tablet 0     Sig: Take 1 tablet (10 mg) by mouth daily       ACE Inhibitors (Including Combos) Protocol Failed - 4/28/2020  8:11 PM        Failed - Blood pressure under 140/90 in past 12 months     BP Readings from Last 3 Encounters:   03/02/20 (!) 150/86   11/01/19 (!) 157/103   04/09/19 132/80                 Failed - Normal serum creatinine on file in past 12 months     Recent Labs   Lab Test 04/09/19  1752   CR 0.70       Ok to refill medication if creatinine is low          Failed - Normal serum potassium on " "file in past 12 months     Recent Labs   Lab Test 04/09/19  1752   POTASSIUM 4.4             Passed - Recent (12 mo) or future (30 days) visit within the authorizing provider's specialty     Patient has had an office visit with the authorizing provider or a provider within the authorizing providers department within the previous 12 mos or has a future within next 30 days. See \"Patient Info\" tab in inbasket, or \"Choose Columns\" in Meds & Orders section of the refill encounter.              Passed - Medication is active on med list        Passed - Patient is age 18 or older        Passed - No active pregnancy on record        Passed - No positive pregnancy test within past 12 months           oxyCODONE-acetaminophen (PERCOCET) 7.5-325 MG per tablet 12 tablet 0     Sig: Take 1 tablet by mouth 2 times daily as needed for pain Needs to be seen for any further refill.       There is no refill protocol information for this order          "

## 2020-05-01 NOTE — TELEPHONE ENCOUNTER
For Hygroton and lisinopril:  Routing refill request to provider for review/approval because:  Labs not current:  Creatinine, potassium, sodium    Controlled Substance Refill Request for Percocet  Problem List Complete:  Yes  Chronic pain syndrome   Problem Detail     Noted:  8/3/2018    Priority:  Medium    Overview Addendum 3/2/2020  4:15 PM by Barbara Esquivel MD    Patient is followed by Barbara Esquivel MD for ongoing prescription of pain medication.  All refills should only be approved by this provider, or covering partner.     Medication(s): percocet.   Maximum quantity per month: 12-24  Clinic visit frequency required: Q 3 months      Controlled substance agreement:  3/2/2020  Encounter-Level CSA:    There are no encounter-level csa.      Patient-Level CSA:    There are no patient-level csa.         UDS - multiple positive for THC -not necessarily disqualifying, but bears monitor     Pain Clinic evaluation in the past: No     DIRE Total Score(s):  No flowsheet data found.     Last [facility] website verification:  done on 3/2/2020   [email or web address]      checked in past 3 months?  Yes 3/2/2020   Last Written Prescription Date:  4/17/20  Last Fill Quantity: 12 tablets  # refills: 0   Last office visit: 3/2/2020 with prescribing provider:  3/2/20   Future Office Visit:  None  RX [place] reviewed:  not reviewed/not due - last done on 3/20/2020    [place]:  [email or web address]            Mary Alaniz RN, BSN, PHN

## 2020-05-04 RX ORDER — LISINOPRIL 10 MG/1
10 TABLET ORAL DAILY
Qty: 90 TABLET | Refills: 0 | Status: SHIPPED | OUTPATIENT
Start: 2020-05-04 | End: 2020-08-11

## 2020-05-04 RX ORDER — OXYCODONE AND ACETAMINOPHEN 7.5; 325 MG/1; MG/1
1 TABLET ORAL 2 TIMES DAILY PRN
Qty: 12 TABLET | Refills: 0 | Status: SHIPPED | OUTPATIENT
Start: 2020-05-04 | End: 2020-05-16

## 2020-05-04 RX ORDER — CHLORTHALIDONE 25 MG/1
25 TABLET ORAL DAILY
Qty: 90 TABLET | Refills: 0 | Status: SHIPPED | OUTPATIENT
Start: 2020-05-04 | End: 2020-08-11

## 2020-05-16 ENCOUNTER — MYC REFILL (OUTPATIENT)
Dept: FAMILY MEDICINE | Facility: CLINIC | Age: 30
End: 2020-05-16

## 2020-05-16 DIAGNOSIS — N94.6 DYSMENORRHEA: ICD-10-CM

## 2020-05-19 ENCOUNTER — MYC REFILL (OUTPATIENT)
Dept: FAMILY MEDICINE | Facility: CLINIC | Age: 30
End: 2020-05-19

## 2020-05-19 DIAGNOSIS — N94.6 DYSMENORRHEA: ICD-10-CM

## 2020-05-20 RX ORDER — OXYCODONE AND ACETAMINOPHEN 7.5; 325 MG/1; MG/1
1 TABLET ORAL 2 TIMES DAILY PRN
Qty: 12 TABLET | Refills: 0 | Status: SHIPPED | OUTPATIENT
Start: 2020-05-20 | End: 2020-06-02

## 2020-05-20 NOTE — TELEPHONE ENCOUNTER
Med refilled.  Just filled 5/4/2020 but allowed 24 per month. Continue to monitor - overdue for follow up with Dr. Esquivel for blood pressure- assist with scheduling virtual visit with Dr. Esquivel and ancillary visit for blood pressure check

## 2020-05-20 NOTE — TELEPHONE ENCOUNTER
Controlled Substance Refill Request for Percocet  Problem List Complete:  Yes  Overview Addendum 3/2/2020  4:15 PM by Barbara Esquivel MD    Patient is followed by Barbara Esquivel MD for ongoing prescription of pain medication.  All refills should only be approved by this provider, or covering partner.     Medication(s): percocet.   Maximum quantity per month: 12-24  Clinic visit frequency required: Q 3 months      Controlled substance agreement:  3/2/2020  Encounter-Level CSA:    There are no encounter-level csa.      Patient-Level CSA:    There are no patient-level csa.         UDS - multiple positive for THC -not necessarily disqualifying, but bears monitor     Pain Clinic evaluation in the past: No     DIRE Total Score(s):  No flowsheet data found.     Last [facility] website verification:  done on 3/2/2020       Carrie Bennett RN, [facility] Triage

## 2020-05-26 RX ORDER — OXYCODONE AND ACETAMINOPHEN 7.5; 325 MG/1; MG/1
1 TABLET ORAL 2 TIMES DAILY PRN
Qty: 12 TABLET | Refills: 0 | OUTPATIENT
Start: 2020-05-26

## 2020-05-26 NOTE — TELEPHONE ENCOUNTER
No appointment has been scheduled.     Controlled Substance Refill Request for Percocet  Problem List Complete:  Yes  Overview Addendum 3/2/2020  4:15 PM by Barbara Esquivel MD    Patient is followed by Barbara Esquivel MD for ongoing prescription of pain medication.  All refills should only be approved by this provider, or covering partner.     Medication(s): percocet.   Maximum quantity per month: 12-24  Clinic visit frequency required: Q 3 months      Controlled substance agreement:  3/2/2020  Encounter-Level CSA:    There are no encounter-level csa.      Patient-Level CSA:    There are no patient-level csa.         UDS - multiple positive for THC -not necessarily disqualifying, but bears monitor     Pain Clinic evaluation in the past: No     DIRE Total Score(s):  No flowsheet data found.     Last [facility] website verification:  done on 3/2/2020      Routing refill request to provider for review/approval because:  Drug not on the [facility] refill protocol   Yesenia Braun RN  [facility]

## 2020-06-02 ENCOUNTER — VIRTUAL VISIT (OUTPATIENT)
Dept: FAMILY MEDICINE | Facility: CLINIC | Age: 30
End: 2020-06-02
Payer: MEDICAID

## 2020-06-02 DIAGNOSIS — L02.92 BOIL: Primary | ICD-10-CM

## 2020-06-02 DIAGNOSIS — N94.6 DYSMENORRHEA: ICD-10-CM

## 2020-06-02 PROCEDURE — 99213 OFFICE O/P EST LOW 20 MIN: CPT | Mod: 95 | Performed by: FAMILY MEDICINE

## 2020-06-02 RX ORDER — CEPHALEXIN 500 MG/1
500 CAPSULE ORAL 3 TIMES DAILY
Qty: 30 CAPSULE | Refills: 0 | Status: SHIPPED | OUTPATIENT
Start: 2020-06-02 | End: 2020-08-11

## 2020-06-02 RX ORDER — OXYCODONE AND ACETAMINOPHEN 7.5; 325 MG/1; MG/1
1 TABLET ORAL 2 TIMES DAILY PRN
Qty: 30 TABLET | Refills: 0 | Status: SHIPPED | OUTPATIENT
Start: 2020-06-02 | End: 2020-06-05

## 2020-06-02 NOTE — PROGRESS NOTES
"Jigna Amador is a 30 year old female who is being evaluated via a billable telephone visit.      The patient has been notified of following:     \"This telephone visit will be conducted via a call between you and your physician/provider. We have found that certain health care needs can be provided without the need for a physical exam.  This service lets us provide the care you need with a short phone conversation.  If a prescription is necessary we can send it directly to your pharmacy.  If lab work is needed we can place an order for that and you can then stop by our lab to have the test done at a later time.    Telephone visits are billed at different rates depending on your insurance coverage. During this emergency period, for some insurers they may be billed the same as an in-person visit.  Please reach out to your insurance provider with any questions.    If during the course of the call the physician/provider feels a telephone visit is not appropriate, you will not be charged for this service.\"    Patient has given verbal consent for Telephone visit?  Yes    What phone number would you like to be contacted at? [phone]    How would you like to obtain your AVS? MyChart    Subjective     Jigna Amador is a 30 year old female who presents via phone visit today for the following health issues:    HPI  Medication Followup of oxycodone-acetaminphen     Taking Medication as prescribed: yes    Side Effects:  None    Medication Helping Symptoms:  Feeling that percocet is not enough.  Would like to have records reviewed.  [facility] in [place] was initial visit for breast pain.   Patient states pain in breasts is 10/10.  Is unable to wear a bra due to pain.  Would like to discuss more mediation      Spoke with patient via phone regarding a small boil under her breast.  Patient is well-known to me and does receive some Percocet to help with dysmenorrhea.  I try to keep this at a bare minimum and I discussed " with patient that I do not feel comfortable using this on a more frequent basis.  But she also has had some issues with recurrent cysts/abscesses around her breast and has developed one about a week ago under her right breast.  Does not feel like it needs draining at this time but is definitely sore.  Has not come to ahead.  We talked about hot packs and other measures.  Not terribly red around this.  No fevers or chills.    Objective   Reported vitals:  There were no vitals taken for this visit.   healthy, alert and no distress  PSYCH: Alert and oriented times 3; coherent speech, normal   rate and volume, able to articulate logical thoughts, able   to abstract reason, no tangential thoughts, no hallucinations   or delusions  Her affect is normal  RESP: No cough, no audible wheezing, able to talk in full sentences  Remainder of exam unable to be completed due to telephone visits    Diagnostic Test Results:  Labs reviewed in Epic        Assessment/Plan:  1. Dysmenorrhea  Will refill pain medication and this time increase the quantity a little bit because of the boil.  But in general I do not want to use this more than a few days a month during her cycle.  She and I discussed this in the past.  - oxyCODONE-acetaminophen (PERCOCET) 7.5-325 MG per tablet; Take 1 tablet by mouth 2 times daily as needed for pain  Dispense: 30 tablet; Refill: 0    2. Boil  We will try a course of antibiotics and hot packs first.  If need be we can get her in the office for drainage.  Discussed mechanism of action of the proposed medication, as well as potential effects, both good and bad.  Patient expressed understanding and agreed with treatment.   - cephALEXin (KEFLEX) 500 MG capsule; Take 1 capsule (500 mg) by mouth 3 times daily  Dispense: 30 capsule; Refill: 0    No follow-ups on file.      Phone call duration:  8 minutes    Barbara Esquivel MD

## 2020-06-04 ENCOUNTER — TELEPHONE (OUTPATIENT)
Dept: FAMILY MEDICINE | Facility: CLINIC | Age: 30
End: 2020-06-04

## 2020-06-04 DIAGNOSIS — N94.6 DYSMENORRHEA: ICD-10-CM

## 2020-06-04 NOTE — TELEPHONE ENCOUNTER
Please verify that is the case with the pharmacy.  If so, I will send in a prescription for the other #20 tabs dated in a few days.

## 2020-06-04 NOTE — TELEPHONE ENCOUNTER
Reason for Call:  Other prescription    Detailed comments: Jigna picked up her oxyCODONE-acetaminophen (PERCOCET) 7.5-325 MG per tablet yesterday from the pharmacy and they only gave her 10 pills. She is needing you to send another prescription for the remainder of her medication to [facility] please.  Thank you     Phone Number Patient can be reached at: Home number on file [phone] (home)    Best Time: Any    Can we leave a detailed message on this number? YES    Call taken on 6/4/2020 at 10:36 AM by Nori Ace

## 2020-06-05 ENCOUNTER — TELEPHONE (OUTPATIENT)
Dept: FAMILY MEDICINE | Facility: CLINIC | Age: 30
End: 2020-06-05

## 2020-06-05 RX ORDER — OXYCODONE AND ACETAMINOPHEN 7.5; 325 MG/1; MG/1
1 TABLET ORAL 2 TIMES DAILY PRN
Qty: 20 TABLET | Refills: 0 | Status: SHIPPED | OUTPATIENT
Start: 2020-06-09 | End: 2020-06-19

## 2020-06-05 NOTE — TELEPHONE ENCOUNTER
Called and spoke with Pharmacist True at [facility] he stated that since the Rx is for Acute pain the Rx can only initially be filled  for 7 days worth of medication.  If provider wants to send Rx for remaining script, she would be able to fill 3 days early on on 6/10/20.     Laury Edgar RN

## 2020-06-05 NOTE — TELEPHONE ENCOUNTER
Awaiting provider's response before contacting patient.     Yesenia Braun RN  [facility]

## 2020-06-05 NOTE — TELEPHONE ENCOUNTER
Reason for Call:  Other prescription    Detailed comments: Pharmacy has question about OXYCODONE 7.5-325G.    Phone Number Patient can be reached at: Other phone number: [phone]    Best Time: Anytime..    Can we leave a detailed message on this number? YES    Call taken on 6/5/2020 at 4:02 PM by Elicia Simmons

## 2020-06-09 NOTE — TELEPHONE ENCOUNTER
Reason for Call:  Other call back    Detailed comments: Pharmacy called back. They are just needing to know if the oxyCODONE-acetaminophen (PERCOCET) 7.5-325 MG per tablet   is for chronic or accute pain. And asked that someone call them back ASAP at [phone]    Phone Number Patient can be reached at: [phone]    Best Time: ANy    Can we leave a detailed message on this number? NO    Call taken on 6/9/2020 at 12:53 PM by Sharon Suarez

## 2020-06-09 NOTE — TELEPHONE ENCOUNTER
.Reason for Call:  Other prescription    Detailed comments: Patient is at the pharmacy and she's wondering if someone could call the pharmacy  and approved her medication.     Phone Number Patient can be reached at: Cell number on file:    Telephone Information:   Mobile [phone]       Best Time: any    Can we leave a detailed message on this number? YES    Call taken on 6/9/2020 at 12:47 PM by Celine Archuleta

## 2020-06-19 ENCOUNTER — MYC REFILL (OUTPATIENT)
Dept: FAMILY MEDICINE | Facility: CLINIC | Age: 30
End: 2020-06-19

## 2020-06-19 DIAGNOSIS — N94.6 DYSMENORRHEA: ICD-10-CM

## 2020-06-19 DIAGNOSIS — G89.4 CHRONIC PAIN SYNDROME: ICD-10-CM

## 2020-06-19 DIAGNOSIS — I10 HYPERTENSION GOAL BP (BLOOD PRESSURE) < 140/90: ICD-10-CM

## 2020-06-19 RX ORDER — LISINOPRIL 10 MG/1
10 TABLET ORAL DAILY
Qty: 90 TABLET | Refills: 0 | Status: CANCELLED | OUTPATIENT
Start: 2020-06-19

## 2020-06-19 RX ORDER — CHLORTHALIDONE 25 MG/1
25 TABLET ORAL DAILY
Qty: 90 TABLET | Refills: 0 | Status: CANCELLED | OUTPATIENT
Start: 2020-06-19

## 2020-06-23 RX ORDER — OXYCODONE AND ACETAMINOPHEN 7.5; 325 MG/1; MG/1
1 TABLET ORAL 2 TIMES DAILY PRN
Qty: 20 TABLET | Refills: 0 | Status: SHIPPED | OUTPATIENT
Start: 2020-06-23 | End: 2020-07-11

## 2020-06-23 NOTE — TELEPHONE ENCOUNTER
Lisinopril and Chlorthalidone both last refilled on 5/4/20 x 90 days, sent to [facility] pharmacy in [place].  MyC message sent to patient to inform of this and to request a prescription transfer to new pharmacy.      Routing refill request to provider for review/approval because:  Drug not on the [facility] refill protocol     Controlled Substance Refill Request for Percocet  Problem List Complete:  Yes   checked in past 3 months?  Yes  6/23/20     Patient is followed by Barbara Esquivel MD for ongoing prescription of pain medication.  All refills should only be approved by this provider, or covering partner.     Medication(s): percocet.   Maximum quantity per month: 12-24  Clinic visit frequency required: Q 3 months      Controlled substance agreement:  3/2/2020  Encounter-Level CSA:    There are no encounter-level csa.      Patient-Level CSA:    Controlled Substance Agreement - Opioid - Scan on 3/3/2020  7:08 AM: Opioid         UDS - multiple positive for THC -not necessarily disqualifying, but bears monitor     Pain Clinic evaluation in the past: No     DIRE Total Score(s):  No flowsheet data found.     Last [place] website verification:  6/23/20    RX [place] ([place] reviewed:  reviewed- no concerns    [place]  [email or web address]

## 2020-06-29 NOTE — TELEPHONE ENCOUNTER
Patient was already notified that Lisinopril and Chlorthalidone both last refilled on 5/4/20 x 90 days, sent to [facility] pharmacy in [place].  MyC message sent to patient to inform of this and to request a prescription transfer to new pharmacy.    Sent another Supersolidt message to notify patient and if she wants to transfer medications to a different pharmacy, she needs to contact the new pharmacy.         Mary Alaniz RN, BSN, PHN

## 2020-06-29 NOTE — TELEPHONE ENCOUNTER
Pt is stating that her medication for lisinopril and Chlorthalidone have not been filled yet and would like a call back concerning this. Pt is confused as to why this refill is taking so long

## 2020-07-11 ENCOUNTER — MYC REFILL (OUTPATIENT)
Dept: FAMILY MEDICINE | Facility: CLINIC | Age: 30
End: 2020-07-11

## 2020-07-11 DIAGNOSIS — N94.6 DYSMENORRHEA: ICD-10-CM

## 2020-07-14 RX ORDER — OXYCODONE AND ACETAMINOPHEN 7.5; 325 MG/1; MG/1
1 TABLET ORAL 2 TIMES DAILY PRN
Qty: 20 TABLET | Refills: 0 | Status: SHIPPED | OUTPATIENT
Start: 2020-07-14 | End: 2020-08-11

## 2020-07-14 NOTE — TELEPHONE ENCOUNTER
Controlled Substance Refill Request for Percocet  Problem List Complete:  Yes  Chronic pain syndrome   Problem Detail     Noted:  8/3/2018    Priority:  Medium    Overview Addendum 6/23/2020  7:51 AM by Krista Castillo RN    Patient is followed by Barbara Esquivel MD for ongoing prescription of pain medication.  All refills should only be approved by this provider, or covering partner.     Medication(s): percocet.   Maximum quantity per month: 12-24  Clinic visit frequency required: Q 3 months      Controlled substance agreement:  3/2/2020  Encounter-Level CSA:    There are no encounter-level csa.      Patient-Level CSA:    Controlled Substance Agreement - Opioid - Scan on 3/3/2020  7:08 AM: Opioid         UDS - multiple positive for THC -not necessarily disqualifying, but bears monitor     Pain Clinic evaluation in the past: No     DIRE Total Score(s):  No flowsheet data found.     Last [place] website verification:  6/23/20   [email or web address]    checked in past 3 months?  Yes 6/23/20   RX monitoring program ([place]) reviewed:  not reviewed/not due - last done on 6/23/20  [place]  [email or web address]  Last Written Prescription Date:  6/23/20  Last Fill Quantity: 20 tablets  # refills: 0   Last office visit: 3/2/2020 with prescribing provider:  3/2/20   Future Office Visit:  None        Mary Alaniz RN, BSN, PHN

## 2020-07-16 DIAGNOSIS — N94.6 DYSMENORRHEA: ICD-10-CM

## 2020-07-16 NOTE — TELEPHONE ENCOUNTER
Reason for Call:  Birth control    Detailed comments: Patient is asking for rx for Birth control    Please call and advise    Phone Number Patient can be reached at: Home number on file [phone] (home)    Best Time: any    Can we leave a detailed message on this number? YES    Call taken on 7/16/2020 at 4:37 PM by Wanda Parker

## 2020-07-20 ENCOUNTER — TELEPHONE (OUTPATIENT)
Dept: FAMILY MEDICINE | Facility: CLINIC | Age: 30
End: 2020-07-20

## 2020-07-20 RX ORDER — LEVONORGESTREL / ETHINYL ESTRADIOL AND ETHINYL ESTRADIOL 150-30(84)
1 KIT ORAL DAILY
Qty: 91 TABLET | Refills: 3 | Status: SHIPPED | OUTPATIENT
Start: 2020-07-20 | End: 2021-06-02

## 2020-07-20 NOTE — TELEPHONE ENCOUNTER
"Requested Prescriptions   Signed Prescriptions Disp Refills    levonorgest-eth estrad 91-Day (SEASONIQUE) 0.15-0.03 &0.01 MG tablet 91 tablet 3     Sig: Take 1 tablet by mouth daily       Contraceptives Protocol Passed - 7/16/2020  5:41 PM        Passed - Patient is not a current smoker if age is 35 or older        Passed - Recent (12 mo) or future (30 days) visit within the authorizing provider's specialty     Patient has had an office visit with the authorizing provider or a provider within the authorizing providers department within the previous 12 mos or has a future within next 30 days. See \"Patient Info\" tab in inbasket, or \"Choose Columns\" in Meds & Orders section of the refill encounter.              Passed - Medication is active on med list        Passed - No active pregnancy on record        Passed - No positive pregnancy test in past 12 months           Prescription approved per [facility] Refill Protocol.      Mary Alaniz RN, BSN, PHN    "

## 2020-07-20 NOTE — TELEPHONE ENCOUNTER
Reason for Call:  Other prescription    Detailed comments: pt states that only part of the medication was filled on 07/14//2020 and they are asking that the rest be filled and sent over as soon as possible as they are out. Thank you.  Pharmacy is attached.    oxyCODONE-acetaminophen (PERCOCET) 7.5-325 MG per tablet    Phone Number Patient can be reached at: Home number on file [phone] (home)    Best Time: Any    Can we leave a detailed message on this number? YES    Call taken on 7/20/2020 at 12:45 PM by Yoon Edwards

## 2020-07-20 NOTE — TELEPHONE ENCOUNTER
Writer called and spoke with F[facility]ician at [facility]. Patient Picked up 14 of the 20 tablets of the oxyCODONE-acetaminophen (PERCOCET) 7.5-325 MG per tablet  on July 16 th. Insurance would only allow her to  14 tablets.  Can provider send Rx for remaining 6 tablets to the pharmacy?    Laury Edgar RN

## 2020-07-21 NOTE — TELEPHONE ENCOUNTER
This writer attempted to contact patient on 07/21/20      Reason for call inform message below, check to see if patient need refill and left detailed message.      If patient calls back:   1st floor [place] Care Team (MA/TC) called. Inform patient that someone from the team will contact them, document that pt called and route to care team.         Vaughn Priest MA

## 2020-07-22 NOTE — TELEPHONE ENCOUNTER
This writer attempted to contact patient on 07/22/20      Reason for call medication request and left message.      If patient calls back:   [place] Care Team (MA/TC) called. Inform patient that someone from the team will contact them, document that pt called and route to care team.         Radha Amador MA

## 2020-07-23 NOTE — TELEPHONE ENCOUNTER
Informed patient of provider's message below. Patient stated that due to her pain, she has been taking an increased dose of her medication therefore she's currently out of medications. Informed patient that message will be routed to provider for further instructions.    PARDEEP oRland MA

## 2020-07-27 ENCOUNTER — MYC REFILL (OUTPATIENT)
Dept: FAMILY MEDICINE | Facility: CLINIC | Age: 30
End: 2020-07-27

## 2020-07-27 DIAGNOSIS — N94.6 DYSMENORRHEA: ICD-10-CM

## 2020-07-27 DIAGNOSIS — L02.92 BOIL: ICD-10-CM

## 2020-07-27 NOTE — TELEPHONE ENCOUNTER
This writer attempted to contact pt on 07/27/20      Reason for call refill and left detailed message.      If patient calls back:   Inform pt of message below per amanda Finnegan, CMA

## 2020-07-30 RX ORDER — OXYCODONE AND ACETAMINOPHEN 7.5; 325 MG/1; MG/1
1 TABLET ORAL 2 TIMES DAILY PRN
Qty: 20 TABLET | Refills: 0 | OUTPATIENT
Start: 2020-07-30

## 2020-07-30 RX ORDER — CEPHALEXIN 500 MG/1
500 CAPSULE ORAL 3 TIMES DAILY
Qty: 30 CAPSULE | Refills: 0 | OUTPATIENT
Start: 2020-07-30

## 2020-07-30 NOTE — TELEPHONE ENCOUNTER
For Keflex:  Routing refill request to provider for review/approval because:  Drug not on the [facility] refill protocol     Controlled Substance Refill Request for Percocet  Problem List Complete:  Yes  Chronic pain syndrome   Problem Detail     Noted:  8/3/2018    Priority:  Medium    Overview Addendum 6/23/2020  7:51 AM by Krista Castillo RN    Patient is followed by Barbara Esquivel MD for ongoing prescription of pain medication.  All refills should only be approved by this provider, or covering partner.     Medication(s): percocet.   Maximum quantity per month: 12-24  Clinic visit frequency required: Q 3 months      Controlled substance agreement:  3/2/2020  Encounter-Level CSA:    There are no encounter-level csa.      Patient-Level CSA:    Controlled Substance Agreement - Opioid - Scan on 3/3/2020  7:08 AM: Opioid         UDS - multiple positive for THC -not necessarily disqualifying, but bears monitor     Pain Clinic evaluation in the past: No     DIRE Total Score(s):  No flowsheet data found.     Last [place] website verification:  6/23/20   [email or web address]    checked in past 3 months?  Yes 6/23/20   RX [place] program ([place]) reviewed:  not reviewed/not due - last done on 6/23/20  [place]:  [email or web address]  Last Written Prescription Date:  7/14/20  Last Fill Quantity: 20 tablets  # refills: 0   Last office visit: 3/2/2020 with prescribing provider:  3/2/20   Future Office Visit:  None              Mary Alaniz RN, BSN, PHN

## 2020-07-30 NOTE — TELEPHONE ENCOUNTER
Percocet - 20 pills given 2 weeks ago.  Prior to that given end of June.  Plan was for 12-24/month.

## 2020-07-30 NOTE — TELEPHONE ENCOUNTER
Patient informed on the message from provider.  Advised to come to [facility] to be seen for the sore on the breast that is about to open as patient reported.    Monet King RN

## 2020-08-11 ENCOUNTER — VIRTUAL VISIT (OUTPATIENT)
Dept: FAMILY MEDICINE | Facility: CLINIC | Age: 30
End: 2020-08-11
Payer: COMMERCIAL

## 2020-08-11 DIAGNOSIS — I10 HYPERTENSION GOAL BP (BLOOD PRESSURE) < 140/90: ICD-10-CM

## 2020-08-11 DIAGNOSIS — G89.4 CHRONIC PAIN SYNDROME: ICD-10-CM

## 2020-08-11 DIAGNOSIS — L02.92 BOIL: Primary | ICD-10-CM

## 2020-08-11 DIAGNOSIS — N94.6 DYSMENORRHEA: ICD-10-CM

## 2020-08-11 PROCEDURE — 99214 OFFICE O/P EST MOD 30 MIN: CPT | Mod: 95 | Performed by: FAMILY MEDICINE

## 2020-08-11 RX ORDER — CHLORTHALIDONE 25 MG/1
25 TABLET ORAL DAILY
Qty: 90 TABLET | Refills: 0 | Status: SHIPPED | OUTPATIENT
Start: 2020-08-11 | End: 2020-11-08

## 2020-08-11 RX ORDER — OXYCODONE AND ACETAMINOPHEN 7.5; 325 MG/1; MG/1
1 TABLET ORAL 2 TIMES DAILY PRN
Qty: 20 TABLET | Refills: 0 | Status: SHIPPED | OUTPATIENT
Start: 2020-08-11 | End: 2020-09-10

## 2020-08-11 RX ORDER — CEPHALEXIN 500 MG/1
500 CAPSULE ORAL 3 TIMES DAILY
Qty: 30 CAPSULE | Refills: 0 | Status: SHIPPED | OUTPATIENT
Start: 2020-08-11 | End: 2020-10-14

## 2020-08-11 RX ORDER — LISINOPRIL 10 MG/1
10 TABLET ORAL DAILY
Qty: 90 TABLET | Refills: 0 | Status: SHIPPED | OUTPATIENT
Start: 2020-08-11 | End: 2020-11-08

## 2020-08-11 NOTE — PROGRESS NOTES
".Jigna Amador is a 30 year old female who is being evaluated via a billable telephone visit.      The patient has been notified of following:     \"This telephone visit will be conducted via a call between you and your physician/provider. We have found that certain health care needs can be provided without the need for a physical exam.  This service lets us provide the care you need with a short phone conversation.  If a prescription is necessary we can send it directly to your pharmacy.  If lab work is needed we can place an order for that and you can then stop by our lab to have the test done at a later time.    Telephone visits are billed at different rates depending on your insurance coverage. During this emergency period, for some insurers they may be billed the same as an in-person visit.  Please reach out to your insurance provider with any questions.    If during the course of the call the physician/provider feels a telephone visit is not appropriate, you will not be charged for this service.\"    Patient has given verbal consent for Telephone visit?  Yes    What phone number would you like to be contacted at? [phone]    How would you like to obtain your AVS? Mail a copy    Subjective     Jigna Amador is a 30 year old female who presents via phone visit today for the following health issues:    HPI    Chronic Pain Follow-Up    Where in your body do you have pain? Breast   How has your pain affected your ability to work? Unable to work  Which of these pain treatments have you tried since your last clinic visit? Other: no   How well are you sleeping? Poor  How has your mood been since your last visit? Slightly worse  Have you had a significant life event? Financial Concerns and Health Concerns      PHQ-9 SCORE 1/14/2019 3/2/2020   PHQ-9 Total Score 13 12     JASON-7 SCORE 1/14/2019 3/2/2020   Total Score 13 10     No flowsheet data found.  Encounter-Level CSA:    There are no encounter-level csa.   "   Patient-Level CSA:    Controlled Substance Agreement - Opioid - Scan on 3/3/2020  7:08 AM: Opioid         How many servings of fruits and vegetables do you eat daily?  2-3    On average, how many sweetened beverages do you drink each day (Examples: soda, juice, sweet tea, etc.  Do NOT count diet or artificially sweetened beverages)?   0    How many days per week do you exercise enough to make your heart beat faster? 7    How many minutes a day do you exercise enough to make your heart beat faster? 30 - 60    How many days per week do you miss taking your medication? 0    Spoke with patient via phone in follow-up of pain related to breast abscesses and dysmenorrhea.  She is up-to-date on her controlled substance agreement and we use very small amounts of as needed pain medication.  I have declined larger dosages and patient understands my reasoning behind this.  Also due for recheck of hypertension and patient is deferring in person visits currently due to the risks of coming into clinic.  This is understandable.    Reviewed and updated as needed this visit by Provider         Review of Systems   Constitutional, HEENT, cardiovascular, pulmonary, gi and gu systems are negative, except as otherwise noted.       Objective   Reported vitals:  There were no vitals taken for this visit.   healthy, alert and no distress  PSYCH: Alert and oriented times 3; coherent speech, normal   rate and volume, able to articulate logical thoughts, able   to abstract reason, no tangential thoughts, no hallucinations   or delusions  Her affect is normal  RESP: No cough, no audible wheezing, able to talk in full sentences  Remainder of exam unable to be completed due to telephone visits    Diagnostic Test Results:  Labs reviewed in Epic        Assessment/Plan:    1. Boil  We will get her going back on a course of antibiotics and discussed self-care  - cephALEXin (KEFLEX) 500 MG capsule; Take 1 capsule (500 mg) by mouth 3 times daily   Dispense: 30 capsule; Refill: 0    2. Dysmenorrhea  Stable and monitored  - oxyCODONE-acetaminophen (PERCOCET) 7.5-325 MG per tablet; Take 1 tablet by mouth 2 times daily as needed for pain  Dispense: 20 tablet; Refill: 0    3. Chronic pain syndrome  As above.  Controlled substance agreement up-to-date.    4. Hypertension goal BP (blood pressure) < 140/90  Plan to refill and recheck in the fall  - lisinopril (ZESTRIL) 10 MG tablet; Take 1 tablet (10 mg) by mouth daily  Dispense: 90 tablet; Refill: 0  - chlorthalidone (HYGROTON) 25 MG tablet; Take 1 tablet (25 mg) by mouth daily  Dispense: 90 tablet; Refill: 0    Return in about 2 months (around 10/11/2020) for Follow up on pain, Can call for refills.      Phone call duration:  8 minutes    Barbara Esquivel MD

## 2020-08-11 NOTE — PATIENT INSTRUCTIONS
At [facility], we strive to deliver an exceptional experience to you, every time we see you. If you receive a survey, please complete it as we do value your feedback.  If you have MyChart, you can expect to receive results automatically within 24 hours of their completion.  Your provider will send a note interpreting your results as well.   If you do not have MyChart, you should receive your results in about a week by mail.    Your care team:                            Family Medicine Internal Medicine   MD Jason Emery MD Shantel Branch-Fleming, MD Srinivasa Vaka, MD Katya Georgiev PA-C Megan Hill, APRN CNP    Alexei Marrufo, MD Pediatrics   Nikolas Guerrero, PAYeesniaC  Viktoria Pina, CNP MD Adeola Vazquez APRN CNP   MD Alexandra Liao MD Deborah Mielke, MD Mary Carmen Underwood, APRN CNP  Milana Mcguire, PAYeseniaC  Kerry Sanabria, CNP  MD Rosibel Leggett MD Angela Wermerskirchen, MD      Clinic hours: Monday - Thursday 7 am-7 pm; Fridays 7 am-5 pm.   Urgent care: Monday - Friday 11 am-9 pm; Saturday and Sunday 9 am-5 pm.    Clinic: [phone]       [facility]: Monday - Thursday 8 am - 7 pm; Friday 8 am - 6 pm  [facility]: [phone]     Use www.[email or web address] for 24/7 diagnosis and treatment of dozens of conditions.

## 2020-09-10 DIAGNOSIS — N94.6 DYSMENORRHEA: ICD-10-CM

## 2020-09-10 RX ORDER — OXYCODONE AND ACETAMINOPHEN 7.5; 325 MG/1; MG/1
1 TABLET ORAL 2 TIMES DAILY PRN
Qty: 20 TABLET | Refills: 0 | Status: SHIPPED | OUTPATIENT
Start: 2020-09-10 | End: 2020-10-08

## 2020-10-08 ENCOUNTER — MYC REFILL (OUTPATIENT)
Dept: FAMILY MEDICINE | Facility: CLINIC | Age: 30
End: 2020-10-08

## 2020-10-08 DIAGNOSIS — N94.6 DYSMENORRHEA: ICD-10-CM

## 2020-10-12 RX ORDER — OXYCODONE AND ACETAMINOPHEN 7.5; 325 MG/1; MG/1
1 TABLET ORAL 2 TIMES DAILY PRN
Qty: 20 TABLET | Refills: 0 | Status: SHIPPED | OUTPATIENT
Start: 2020-10-12 | End: 2020-11-08

## 2020-10-12 NOTE — TELEPHONE ENCOUNTER
Controlled Substance Refill Request for Oxycodone  Problem List Complete:  Yes  Chronic pain syndrome  Problem Detail    Noted:  8/3/2018   Priority:  Medium   Overview Addendum 9/10/2020 10:58 AM by Mary Alaniz RN   Patient is followed by Barbara Esquivel MD for ongoing prescription of pain medication.  All refills should only be approved by this provider, or covering partner.     Medication(s): percocet.   Maximum quantity per month: 12-24  Clinic visit frequency required: Q 3 months      Controlled substance agreement:  3/2/2020  Encounter-Level CSA:    There are no encounter-level csa.      Patient-Level CSA:    Controlled Substance Agreement - Opioid - Scan on 3/3/2020  7:08 AM: Opioid         UDS - multiple positive for THC -not necessarily disqualifying, but bears monitor     Pain Clinic evaluation in the past: No     DIRE Total Score(s):  No flowsheet data found.     Last [place] website verification:  09/10/20    [email or web address]    checked in past 3 months?  Yes 9/10/20   RX monitoring program ([place]MP) reviewed:  not reviewed/not due - last done on 9/10/20  [place]:  [email or web address]  Last Written Prescription Date:  9/10/20  Last Fill Quantity: 20 tablets  # refills: 0   Last office visit: Visit date not found with prescribing provider:  8/11/20 Virtual visit   Future Office Visit:  None        Mary Alaniz RN, BSN, PHN

## 2020-10-14 ENCOUNTER — VIRTUAL VISIT (OUTPATIENT)
Dept: FAMILY MEDICINE | Facility: CLINIC | Age: 30
End: 2020-10-14
Payer: COMMERCIAL

## 2020-10-14 DIAGNOSIS — L98.491 SKIN ULCER, LIMITED TO BREAKDOWN OF SKIN (H): Primary | ICD-10-CM

## 2020-10-14 DIAGNOSIS — B35.3 TINEA PEDIS OF BOTH FEET: ICD-10-CM

## 2020-10-14 DIAGNOSIS — J02.9 PHARYNGITIS, UNSPECIFIED ETIOLOGY: ICD-10-CM

## 2020-10-14 DIAGNOSIS — B37.31 YEAST INFECTION OF THE VAGINA: ICD-10-CM

## 2020-10-14 PROCEDURE — 99214 OFFICE O/P EST MOD 30 MIN: CPT | Mod: 95 | Performed by: FAMILY MEDICINE

## 2020-10-14 RX ORDER — FLUCONAZOLE 200 MG/1
200 TABLET ORAL DAILY
Qty: 5 TABLET | Refills: 0 | Status: SHIPPED | OUTPATIENT
Start: 2020-10-14 | End: 2020-10-19

## 2020-10-14 RX ORDER — ANORECTAL OINTMENT 15.7; .44; 24; 20.6 G/100G; G/100G; G/100G; G/100G
OINTMENT TOPICAL 4 TIMES DAILY PRN
Qty: 71 G | Refills: 0 | Status: SHIPPED | OUTPATIENT
Start: 2020-10-14 | End: 2021-01-04

## 2020-10-14 RX ORDER — OMEPRAZOLE 40 MG/1
40 CAPSULE, DELAYED RELEASE ORAL DAILY
Qty: 30 CAPSULE | Refills: 2 | Status: SHIPPED | OUTPATIENT
Start: 2020-10-14 | End: 2022-10-19

## 2020-10-14 RX ORDER — CLOTRIMAZOLE 1 %
CREAM (GRAM) TOPICAL 2 TIMES DAILY
Qty: 60 G | Refills: 1 | Status: SHIPPED | OUTPATIENT
Start: 2020-10-14 | End: 2020-12-05

## 2020-10-14 NOTE — PROGRESS NOTES
"Jigna Amador is a 30 year old female who is being evaluated via a billable telephone visit.      The patient has been notified of following:     \"This telephone visit will be conducted via a call between you and your physician/provider. We have found that certain health care needs can be provided without the need for a physical exam.  This service lets us provide the care you need with a short phone conversation.  If a prescription is necessary we can send it directly to your pharmacy.  If lab work is needed we can place an order for that and you can then stop by our lab to have the test done at a later time.    Telephone visits are billed at different rates depending on your insurance coverage. During this emergency period, for some insurers they may be billed the same as an in-person visit.  Please reach out to your insurance provider with any questions.    If during the course of the call the physician/provider feels a telephone visit is not appropriate, you will not be charged for this service.\"    Patient has given verbal consent for Telephone visit?  Yes    What phone number would you like to be contacted at? cell    How would you like to obtain your AVS? Robsonhart    Subjective     Jigna Amador is a 30 year old female who presents via phone visit today for the following health issues:    HPI     Phone visit with patient today about a few issues.  Had a pedicure a couple of weeks ago and since then she says she has developed a significant foot odor.  I asked about the outbreak of a rash but she was not sure if there was a rash per se.  Is experiencing some vaginal yeast infection symptoms and asks about Diflucan to treat this.  No recent antibiotics.  Dry itchy vaginal discharge.  No other skin rash associated but she still is having sores under her breasts.  We have treated these in the past as abscesses but there is also a component of macerated skin breakdown.  Patient says the lesions are quite sore.    On an " unrelated note she has been having intermittent sore throat.  No other illness symptoms such as fever or chills or sinus symptoms.  No cough or shortness of breath.  No loss of smell or taste and she does not think this is related to an acute illness such as COVID as it has been going on for a long time.  Denies any change in her voice but she does endorse some reflux symptoms at times.    Review of Systems   Constitutional, HEENT, cardiovascular, pulmonary, gi and gu systems are negative, except as otherwise noted.       Objective          Vitals:  No vitals were obtained today due to virtual visit.    healthy, alert and no distress  PSYCH: Alert and oriented times 3; coherent speech, normal   rate and volume, able to articulate logical thoughts, able   to abstract reason, no tangential thoughts, no hallucinations   or delusions  Her affect is normal  RESP: No cough, no audible wheezing, able to talk in full sentences  Remainder of exam unable to be completed due to telephone visits    Past labs reviewed with the patient.         Assessment/Plan:    Assessment & Plan     Skin ulcer, limited to breakdown of skin (H)  I think she is having some macerated skin breakdown with a component of perhaps intertrigo given her obesity and the location.  So we discussed topical barrier creams and avoidance of irritants.  We will try some calmoseptine and see if that can help with the skin breakdown.  We will be treating yeast and fungus as well.  She wants to send me a picture of those lesions and I provided my email to do so.  Preference would be for MyChart as patient understands of the emails not necessarily medically secure.  Consideration for dermatology referral  - menthol-zinc oxide (CALMOSEPTINE) 0.44-20.6 % OINT ointment; Apply topically 4 times daily as needed for skin protection    Tinea pedis of both feet  Presumably picked up a fungal infection related to recent pedicure.  So we will treat that with topical cream  "but we are going to be using Diflucan anyway for her vaginal yeast infection.  We will try a longer course to see if we can clear this all up.  Curious to see if it helps with the breast lesions at all  - fluconazole (DIFLUCAN) 200 MG tablet; Take 1 tablet (200 mg) by mouth daily for 5 days  - clotrimazole (LOTRIMIN) 1 % external cream; Apply topically 2 times daily For feet    Yeast infection of the vagina  Discussed mechanism of action of the proposed medication, as well as potential effects, both good and bad.  Patient expressed understanding and agreed with treatment.   - fluconazole (DIFLUCAN) 200 MG tablet; Take 1 tablet (200 mg) by mouth daily for 5 days    Pharyngitis, unspecified etiology  Suspect she may be having some ongoing nocturnal reflux.  Discussed mechanism of action of the proposed medication, as well as potential effects, both good and bad.  Patient expressed understanding and agreed with treatment.   - omeprazole (PRILOSEC) 40 MG DR capsule; Take 1 capsule (40 mg) by mouth daily     Tobacco Cessation:   reports that she has been smoking cigarettes. She has a 5.00 pack-year smoking history. She uses smokeless tobacco.  Tobacco Cessation Action Plan: Self help information given to patient      BMI:   Estimated body mass index is 53.42 kg/m  as calculated from the following:    Height as of 3/2/20: 1.664 m (5' 5.5\").    Weight as of 3/2/20: 147.9 kg (326 lb).   Weight management plan: Discussed healthy diet and exercise guidelines         See Patient Instructions    Return in about 2 weeks (around 10/28/2020) for Contact me with progress.    Barbara Esquivel MD  [facility]    Phone call duration:  13 minutes               "

## 2020-10-19 ENCOUNTER — TELEPHONE (OUTPATIENT)
Dept: FAMILY MEDICINE | Facility: CLINIC | Age: 30
End: 2020-10-19

## 2020-10-19 NOTE — TELEPHONE ENCOUNTER
.Reason for Call:  Other PRESCRIPTIONS     Detailed comments: pt would like a call regarding her prescriptions     Phone Number Patient can be reached at: Cell number on file:    Telephone Information:   Mobile [phone]       Best Time: any    Can we leave a detailed message on this number? YES    Call taken on 10/19/2020 at 11:53 AM by Dorys Brown

## 2020-10-22 NOTE — TELEPHONE ENCOUNTER
I have attempted without success to contact this patient by phone to return their call.  Left message on voicemail to return call. Amanda Maloney CMA([facility])

## 2020-10-26 NOTE — TELEPHONE ENCOUNTER
This writer attempted to contact patient on 10/26/20      Reason for call clarify message  and unable to leave message. Voicemail had a male voice      If patient calls back:   [facility] Care Team (MA/TC) called. Inform patient that someone from the team will contact them, document that pt called and route to care team.     3 ATTEMPTS UNABLE TO CONTACT PATIENT    Vaughn Priest MA

## 2020-11-08 ENCOUNTER — MYC REFILL (OUTPATIENT)
Dept: FAMILY MEDICINE | Facility: CLINIC | Age: 30
End: 2020-11-08

## 2020-11-08 DIAGNOSIS — I10 HYPERTENSION GOAL BP (BLOOD PRESSURE) < 140/90: ICD-10-CM

## 2020-11-08 DIAGNOSIS — N94.6 DYSMENORRHEA: ICD-10-CM

## 2020-11-10 DIAGNOSIS — I10 HYPERTENSION GOAL BP (BLOOD PRESSURE) < 140/90: ICD-10-CM

## 2020-11-10 RX ORDER — CHLORTHALIDONE 25 MG/1
25 TABLET ORAL DAILY
Qty: 90 TABLET | Refills: 0 | Status: CANCELLED | OUTPATIENT
Start: 2020-11-10

## 2020-11-10 NOTE — TELEPHONE ENCOUNTER
Routing refill request to provider for review/approval because:  Blood pressure fails protocol  Labs not current: Na, K, Creatinine   Drug not on the [facility] refill protocol - Percocet     Controlled Substance Refill Request for Oxycodone  Problem List Complete:  Yes  Chronic pain syndrome  Problem Detail     Noted:  8/3/2018   Priority:  Medium   Overview Addendum 9/10/2020 10:58 AM by Mary Alaniz RN   Patient is followed by Barbara Esquivel MD for ongoing prescription of pain medication.  All refills should only be approved by this provider, or covering partner.     Medication(s): percocet.   Maximum quantity per month: 12-24  Clinic visit frequency required: Q 3 months      Controlled substance agreement:  3/2/2020  Encounter-Level CSA:    There are no encounter-level csa.      Patient-Level CSA:    Controlled Substance Agreement - Opioid - Scan on 3/3/2020  7:08 AM: Opioid         UDS - multiple positive for THC -not necessarily disqualifying, but bears monitor     Pain Clinic evaluation in the past: No     DIRE Total Score(s):  No flowsheet data found.     Last [facility] website verification:  09/10/20    [email or web address]    checked in past 3 months?  Yes 9/10/20     Last Written Prescription Date:  10/12/20  Last Fill Quantity: 20,  # refills: 0   Last visit: Virtual visit on 10/14/20 with Dr. Esquivel   Future Office Visit:  None      Yesenia Braun RN  [facility]

## 2020-11-11 RX ORDER — LISINOPRIL 10 MG/1
10 TABLET ORAL DAILY
Qty: 90 TABLET | Refills: 0 | Status: SHIPPED | OUTPATIENT
Start: 2020-11-11 | End: 2021-02-05

## 2020-11-11 RX ORDER — OXYCODONE AND ACETAMINOPHEN 7.5; 325 MG/1; MG/1
1 TABLET ORAL 2 TIMES DAILY PRN
Qty: 20 TABLET | Refills: 0 | Status: SHIPPED | OUTPATIENT
Start: 2020-11-11 | End: 2020-12-05

## 2020-11-11 RX ORDER — CHLORTHALIDONE 25 MG/1
25 TABLET ORAL DAILY
Qty: 90 TABLET | Refills: 0 | Status: SHIPPED | OUTPATIENT
Start: 2020-11-11 | End: 2021-02-05

## 2020-12-05 ENCOUNTER — MYC REFILL (OUTPATIENT)
Dept: FAMILY MEDICINE | Facility: CLINIC | Age: 30
End: 2020-12-05

## 2020-12-05 DIAGNOSIS — B35.3 TINEA PEDIS OF BOTH FEET: ICD-10-CM

## 2020-12-05 DIAGNOSIS — N94.6 DYSMENORRHEA: ICD-10-CM

## 2020-12-08 ENCOUNTER — MYC REFILL (OUTPATIENT)
Dept: FAMILY MEDICINE | Facility: CLINIC | Age: 30
End: 2020-12-08

## 2020-12-08 DIAGNOSIS — N94.6 DYSMENORRHEA: ICD-10-CM

## 2020-12-08 DIAGNOSIS — G89.4 CHRONIC PAIN SYNDROME: ICD-10-CM

## 2020-12-08 DIAGNOSIS — L02.91 ABSCESS: ICD-10-CM

## 2020-12-08 DIAGNOSIS — B35.3 TINEA PEDIS OF BOTH FEET: ICD-10-CM

## 2020-12-08 RX ORDER — CLOTRIMAZOLE 1 %
CREAM (GRAM) TOPICAL 2 TIMES DAILY
Qty: 60 G | Refills: 1 | Status: SHIPPED | OUTPATIENT
Start: 2020-12-08

## 2020-12-08 RX ORDER — OXYCODONE AND ACETAMINOPHEN 7.5; 325 MG/1; MG/1
1 TABLET ORAL 2 TIMES DAILY PRN
Qty: 20 TABLET | Refills: 0 | Status: SHIPPED | OUTPATIENT
Start: 2020-12-08 | End: 2021-01-04

## 2020-12-08 NOTE — TELEPHONE ENCOUNTER
Routing refill request to provider for review/approval because:  Drug not on the [facility] refill protocol- Lotrimin and Percocet    Controlled Substance Refill Request for Percocet   Problem List Complete:  Yes  Chronic pain syndrome  Problem Detail     Noted:  8/3/2018   Priority:  Medium   Overview Addendum 9/10/2020 10:58 AM by Mary Alaniz RN   Patient is followed by Barbara Esquivel MD for ongoing prescription of pain medication.  All refills should only be approved by this provider, or covering partner.     Medication(s): percocet.   Maximum quantity per month: 12-24  Clinic visit frequency required: Q 3 months      Controlled substance agreement:  3/2/2020  Encounter-Level CSA:    There are no encounter-level csa.      Patient-Level CSA:    Controlled Substance Agreement - Opioid - Scan on 3/3/2020  7:08 AM: Opioid         UDS - multiple positive for THC -not necessarily disqualifying, but bears monitor     Pain Clinic evaluation in the past: No     DIRE Total Score(s):  No flowsheet data found.     Last [facility] website verification:  09/10/20    [email or web address]    checked in past 3 months?  Yes 9/10/20      Last Written Prescription Date:  11/11/20  Last Fill Quantity: 20,  # refills: 0   Last virtual visit: 10/14/20   Future Office Visit:  None    Yesenia Braun RN  [facility]             Visual acuity is at potential.

## 2020-12-11 RX ORDER — OXYCODONE AND ACETAMINOPHEN 7.5; 325 MG/1; MG/1
1 TABLET ORAL 2 TIMES DAILY PRN
Qty: 20 TABLET | Refills: 0 | Status: CANCELLED | OUTPATIENT
Start: 2020-12-11

## 2020-12-11 RX ORDER — CLOTRIMAZOLE 1 %
CREAM (GRAM) TOPICAL 2 TIMES DAILY
Qty: 60 G | Refills: 1 | Status: CANCELLED | OUTPATIENT
Start: 2020-12-11

## 2020-12-11 RX ORDER — TRIAMCINOLONE ACETONIDE 1 MG/G
CREAM TOPICAL 3 TIMES DAILY PRN
Qty: 30 G | Refills: 0 | Status: SHIPPED | OUTPATIENT
Start: 2020-12-11 | End: 2022-10-19

## 2020-12-11 NOTE — TELEPHONE ENCOUNTER
For Lotrimin:  Routing refill request to provider for review/approval because:  Drug not on the [facility] refill protocol     Controlled Substance Refill Request for Percocet  Problem List Complete:  Yes  Chronic pain syndrome  Problem Detail    Noted:  8/3/2018   Priority:  Medium   Overview Addendum 12/11/2020  3:05 PM by Mary Alaniz RN   Patient is followed by Barbara Esquivel MD for ongoing prescription of pain medication.  All refills should only be approved by this provider, or covering partner.     Medication(s): percocet.   Maximum quantity per month: 12-24  Clinic visit frequency required: Q 3 months      Controlled substance agreement:  3/2/2020  Encounter-Level CSA:    There are no encounter-level csa.      Patient-Level CSA:    Controlled Substance Agreement - Opioid - Scan on 3/3/2020  7:08 AM: Opioid         UDS - multiple positive for THC -not necessarily disqualifying, but bears monitor     Pain Clinic evaluation in the past: No     DIRE Total Score(s):  No flowsheet data found.     Last MNP website verification: 12/11/20     [email or web address]    checked in past 3 months?  Yes 12/11/20   RX monitoring program ([place]MP) reviewed:  reviewed- no concerns  [place]MP profile:  [email or web address]  Last Written Prescription Date:  12/8/20  Last Fill Quantity: 20 tablets  # refills: 0   Last office visit: 3/2/2020 with prescribing provider:  10/14/20 Virtual visit   Future Office Visit:  None      For triamcinolone:  Prescription approved per [facility] Refill Protocol.        Mary Alaniz RN, BSN, PHN

## 2020-12-20 ENCOUNTER — HEALTH MAINTENANCE LETTER (OUTPATIENT)
Age: 30
End: 2020-12-20

## 2021-01-04 ENCOUNTER — MYC REFILL (OUTPATIENT)
Dept: FAMILY MEDICINE | Facility: CLINIC | Age: 31
End: 2021-01-04

## 2021-01-04 DIAGNOSIS — N94.6 DYSMENORRHEA: ICD-10-CM

## 2021-01-04 DIAGNOSIS — L98.491 SKIN ULCER, LIMITED TO BREAKDOWN OF SKIN (H): ICD-10-CM

## 2021-01-05 RX ORDER — OXYCODONE AND ACETAMINOPHEN 7.5; 325 MG/1; MG/1
1 TABLET ORAL 2 TIMES DAILY PRN
Qty: 20 TABLET | Refills: 0 | Status: SHIPPED | OUTPATIENT
Start: 2021-01-05 | End: 2021-02-05

## 2021-01-05 RX ORDER — ANORECTAL OINTMENT 15.7; .44; 24; 20.6 G/100G; G/100G; G/100G; G/100G
OINTMENT TOPICAL 4 TIMES DAILY PRN
Qty: 71 G | Refills: 0 | Status: SHIPPED | OUTPATIENT
Start: 2021-01-05

## 2021-02-05 ENCOUNTER — MYC REFILL (OUTPATIENT)
Dept: FAMILY MEDICINE | Facility: CLINIC | Age: 31
End: 2021-02-05

## 2021-02-05 DIAGNOSIS — N94.6 DYSMENORRHEA: ICD-10-CM

## 2021-02-05 DIAGNOSIS — I10 HYPERTENSION GOAL BP (BLOOD PRESSURE) < 140/90: ICD-10-CM

## 2021-02-05 NOTE — TELEPHONE ENCOUNTER
Needs to schedule face to face visit and will get enough medication to make it to that appointment - patient notified via [employer]

## 2021-02-05 NOTE — TELEPHONE ENCOUNTER
Routing refill request to provider for review/approval because:  Labs not current:  Creatinine, potassium, sodium    BP Readings from Last 3 Encounters:   03/02/20 (!) 150/86   11/01/19 (!) 157/103   04/09/19 132/80     No appointment pending at this time.  Routing to provider to advise.    Lily LANDISN, RN

## 2021-02-08 DIAGNOSIS — I10 HYPERTENSION GOAL BP (BLOOD PRESSURE) < 140/90: ICD-10-CM

## 2021-02-08 RX ORDER — OXYCODONE AND ACETAMINOPHEN 7.5; 325 MG/1; MG/1
1 TABLET ORAL 2 TIMES DAILY PRN
Qty: 20 TABLET | Refills: 0 | Status: SHIPPED | OUTPATIENT
Start: 2021-02-08 | End: 2021-02-19

## 2021-02-08 RX ORDER — LISINOPRIL 10 MG/1
10 TABLET ORAL DAILY
Qty: 15 TABLET | Refills: 0 | Status: SHIPPED | OUTPATIENT
Start: 2021-02-08 | End: 2021-05-03

## 2021-02-08 RX ORDER — CHLORTHALIDONE 25 MG/1
25 TABLET ORAL DAILY
Qty: 15 TABLET | Refills: 0 | Status: SHIPPED | OUTPATIENT
Start: 2021-02-08 | End: 2021-04-26

## 2021-02-08 NOTE — TELEPHONE ENCOUNTER
I spoke to the patient and I scheduled a face to face appointment for her on 2/22/21.  The patient would like a partial refill to last until her appointment.  Thank you.  Lashonda Siegel,

## 2021-02-08 NOTE — TELEPHONE ENCOUNTER
Given 15 days of antihypertensives and refilled percocet for 20 tablets -patient notified via [facility]

## 2021-02-09 RX ORDER — LISINOPRIL 10 MG/1
TABLET ORAL
Qty: 90 TABLET | Refills: 0 | OUTPATIENT
Start: 2021-02-09

## 2021-02-19 ENCOUNTER — MYC REFILL (OUTPATIENT)
Dept: FAMILY MEDICINE | Facility: CLINIC | Age: 31
End: 2021-02-19

## 2021-02-19 DIAGNOSIS — N94.6 DYSMENORRHEA: ICD-10-CM

## 2021-02-19 DIAGNOSIS — I10 HYPERTENSION GOAL BP (BLOOD PRESSURE) < 140/90: ICD-10-CM

## 2021-02-19 RX ORDER — CHLORTHALIDONE 25 MG/1
TABLET ORAL
Qty: 15 TABLET | Refills: 0 | OUTPATIENT
Start: 2021-02-19

## 2021-02-19 RX ORDER — OXYCODONE AND ACETAMINOPHEN 7.5; 325 MG/1; MG/1
1 TABLET ORAL 2 TIMES DAILY PRN
Qty: 20 TABLET | Refills: 0 | Status: SHIPPED | OUTPATIENT
Start: 2021-02-19 | End: 2021-04-29

## 2021-02-19 NOTE — TELEPHONE ENCOUNTER
Controlled Substance Refill Request for Percocet  Problem List Complete:  Yes  Chronic pain syndrome  Problem Detail    Noted:  8/3/2018   Priority:  Medium   Overview Addendum 12/11/2020  3:05 PM by Mary Alaniz RN   Patient is followed by Barbara Esquivel MD for ongoing prescription of pain medication.  All refills should only be approved by this provider, or covering partner.     Medication(s): percocet.   Maximum quantity per month: 12-24  Clinic visit frequency required: Q 3 months      Controlled substance agreement:  3/2/2020  Encounter-Level CSA:    There are no encounter-level csa.      Patient-Level CSA:    Controlled Substance Agreement - Opioid - Scan on 3/3/2020  7:08 AM: Opioid         UDS - multiple positive for THC -not necessarily disqualifying, but bears monitor     Pain Clinic evaluation in the past: No     DIRE Total Score(s):  No flowsheet data found.     Last MNP website verification: 12/11/20     [email or web address]    checked in past 3 months?  Yes 12/11/20   RX [place] program ([place]) reviewed:  not reviewed/not due - last done on 12/11/20  [place] profile:  [email or web address]  Last Written Prescription Date:  2/8/21  Last Fill Quantity: 20 tablets  # refills: 0   Last office visit: Visit date not found with prescribing provider:  10/14/20 Virtual visit   Future Office Visit:   Next 5 appointments (look out 90 days)    Feb 22, 2021  3:20 PM  Office Visit with Barbara Esquivel MD  [facility] ([facility] ) [address] [place] [id number]  [phone]               Mary Alaniz RN, BSN, PHN

## 2021-02-19 NOTE — TELEPHONE ENCOUNTER
Denial sent, has refills from 2/8/21, # 15.  Short term will be addressed at upcoming appointment.  Next 5 appointments (look out 90 days)    Feb 22, 2021  3:20 PM  Office Visit with Barbara Esquivel MD  [facility] ([facility] ) [address] [place] [id number]  [phone]        Maxine Hunt RN

## 2021-02-22 ENCOUNTER — TELEPHONE (OUTPATIENT)
Dept: FAMILY MEDICINE | Facility: CLINIC | Age: 31
End: 2021-02-22

## 2021-02-22 NOTE — TELEPHONE ENCOUNTER
Reason for Call:  Other prescription    Detailed comments: Patient would like a phone call to talk about her Blood Pressure Medications? Thank you    Phone Number Patient can be reached at: Cell number on file:    Telephone Information:   Mobile [phone]       Best Time: anytime    Can we leave a detailed message on this number? YES    Call taken on 2/22/2021 at 11:55 AM by Leatha Freire

## 2021-02-23 NOTE — TELEPHONE ENCOUNTER
The one that was sent on 2/19?  Can we verify that with the pharmacy please before I send a new 1?

## 2021-02-23 NOTE — TELEPHONE ENCOUNTER
I have not heard anything about the chlorthalidone recall.  But if for some reason that is not available I definitely have other choices that we could try.    As far as clinic safety I think we are doing quite a bit to keep people distant and safe.  If she would rather do a virtual visit that would work as well.  But I think the clinic is pretty darn safe.    And as far as  policy on the medication, that is something driven by the pharmacy not us

## 2021-02-23 NOTE — TELEPHONE ENCOUNTER
Pt calling to state that pharmacy never got the Rx for oxycodone.    Please advise.    Kiki RUELAS, Patient Care

## 2021-02-23 NOTE — TELEPHONE ENCOUNTER
Called patient.    She had heard there was a recall on the Chlorthalidone medication.    Should she switch to another medication?    She also had not picked up her oxycodone medication, last week she had a friend go to the pharmacy ot pick it up.  See that the earliest fill date was 2/19/2021 and let her know, she needs to  herself.    She states she was contemplating not coming into the clinic due to the Covid pandemic.   Let patient know, it is safe to be in the clinic.  Necessary precautions are in place.   Also, Dr. Esquivel will need to see the patient for further refills.     She verbalized understanding.    Please advise on Chlorthalidone medication.    Leatha Peña RN, [facility]

## 2021-02-24 NOTE — TELEPHONE ENCOUNTER
Called pharmacy, pt picked up Rx yesterday.  I also asked pharmacy if there was a recall on the chlorthalidone but there is not at this time.  I called pt and let her know that there is no recall on the med and that our clinic is a safe place to be as we clean rooms and BP cuffs in between patients.

## 2021-03-09 ENCOUNTER — MYC REFILL (OUTPATIENT)
Dept: FAMILY MEDICINE | Facility: CLINIC | Age: 31
End: 2021-03-09

## 2021-03-09 DIAGNOSIS — N94.6 DYSMENORRHEA: ICD-10-CM

## 2021-03-09 DIAGNOSIS — N61.1 ABSCESS OF BREAST: ICD-10-CM

## 2021-03-10 RX ORDER — OXYCODONE AND ACETAMINOPHEN 7.5; 325 MG/1; MG/1
1 TABLET ORAL 2 TIMES DAILY PRN
Qty: 20 TABLET | Refills: 0 | OUTPATIENT
Start: 2021-03-10

## 2021-03-10 RX ORDER — ZINC OXIDE 20 %
OINTMENT (GRAM) TOPICAL
Qty: 60 G | Refills: 0 | Status: SHIPPED | OUTPATIENT
Start: 2021-03-10

## 2021-03-10 NOTE — TELEPHONE ENCOUNTER
Routing refill request to provider for review/approval because:  Drug not on the [facility] refill protocol     Lily LANDISN, RN

## 2021-03-19 ENCOUNTER — MYC REFILL (OUTPATIENT)
Dept: FAMILY MEDICINE | Facility: CLINIC | Age: 31
End: 2021-03-19

## 2021-03-19 DIAGNOSIS — N94.6 DYSMENORRHEA: ICD-10-CM

## 2021-03-22 RX ORDER — OXYCODONE AND ACETAMINOPHEN 7.5; 325 MG/1; MG/1
1 TABLET ORAL 2 TIMES DAILY PRN
Qty: 20 TABLET | Refills: 0 | OUTPATIENT
Start: 2021-03-22

## 2021-04-21 ENCOUNTER — MYC REFILL (OUTPATIENT)
Dept: FAMILY MEDICINE | Facility: CLINIC | Age: 31
End: 2021-04-21

## 2021-04-21 DIAGNOSIS — N94.6 DYSMENORRHEA: ICD-10-CM

## 2021-04-21 RX ORDER — OXYCODONE AND ACETAMINOPHEN 7.5; 325 MG/1; MG/1
1 TABLET ORAL 2 TIMES DAILY PRN
Qty: 20 TABLET | Refills: 0 | OUTPATIENT
Start: 2021-04-21

## 2021-04-24 ENCOUNTER — HEALTH MAINTENANCE LETTER (OUTPATIENT)
Age: 31
End: 2021-04-24

## 2021-04-24 ENCOUNTER — NURSE TRIAGE (OUTPATIENT)
Dept: NURSING | Facility: CLINIC | Age: 31
End: 2021-04-24

## 2021-04-24 DIAGNOSIS — I10 HYPERTENSION GOAL BP (BLOOD PRESSURE) < 140/90: ICD-10-CM

## 2021-04-24 NOTE — TELEPHONE ENCOUNTER
Having right  breast pain.  Would like a refill of her Percocet.  States that Dr. Esquivel is aware of an ulcer on her breast.  Advised that I could message her MD, but that On-Call MD's do not refill narcotics.  Advised that if she is having intolerable pain that she would need to go to urgent care.    Reason for Disposition    [1] Breast pain AND [2] cause is not known    Protocols used: BREAST SYMPTOMS-A-AH

## 2021-04-26 RX ORDER — CHLORTHALIDONE 25 MG/1
TABLET ORAL
Qty: 15 TABLET | Refills: 0 | Status: SHIPPED | OUTPATIENT
Start: 2021-04-26 | End: 2021-05-03

## 2021-04-26 NOTE — TELEPHONE ENCOUNTER
Routing refill request to provider for review/approval because:  Labs not current  BP Failed Protocol     Malathi Schuler RN, BSN, CMSRN  [facility]

## 2021-04-28 ENCOUNTER — TELEPHONE (OUTPATIENT)
Dept: FAMILY MEDICINE | Facility: CLINIC | Age: 31
End: 2021-04-28

## 2021-04-28 NOTE — TELEPHONE ENCOUNTER
Pt states she is trying to get refills of lisinopril, chlorthalidone, and oxy.  See last few messages.  Advised pt provider declined refills due to her being due for an appointment.  Advised controlled substances can't be refilled if last ov is more than 6 months ago.  Advised if she schedules an appointment she can send a [employer] message asking for refills of the other meds to get to the appointment. Pt agrees to plan.  Suyapa LANDISN, RN

## 2021-05-03 ENCOUNTER — VIRTUAL VISIT (OUTPATIENT)
Dept: FAMILY MEDICINE | Facility: CLINIC | Age: 31
End: 2021-05-03
Payer: COMMERCIAL

## 2021-05-03 DIAGNOSIS — E66.01 MORBID OBESITY WITH BMI OF 50.0-59.9, ADULT (H): ICD-10-CM

## 2021-05-03 DIAGNOSIS — I10 HYPERTENSION GOAL BP (BLOOD PRESSURE) < 140/90: Primary | ICD-10-CM

## 2021-05-03 DIAGNOSIS — N62 LARGE BREASTS: ICD-10-CM

## 2021-05-03 DIAGNOSIS — N94.6 DYSMENORRHEA: ICD-10-CM

## 2021-05-03 DIAGNOSIS — N64.4 BREAST PAIN: ICD-10-CM

## 2021-05-03 DIAGNOSIS — G89.4 CHRONIC PAIN SYNDROME: ICD-10-CM

## 2021-05-03 PROBLEM — L98.491 SKIN ULCER, LIMITED TO BREAKDOWN OF SKIN (H): Status: RESOLVED | Noted: 2020-10-14 | Resolved: 2021-05-03

## 2021-05-03 PROCEDURE — 99214 OFFICE O/P EST MOD 30 MIN: CPT | Mod: 95 | Performed by: FAMILY MEDICINE

## 2021-05-03 RX ORDER — MULTIVITAMIN
1 TABLET ORAL DAILY
Qty: 90 TABLET | Refills: 3 | Status: SHIPPED | OUTPATIENT
Start: 2021-05-03

## 2021-05-03 RX ORDER — LISINOPRIL 10 MG/1
10 TABLET ORAL DAILY
Qty: 90 TABLET | Refills: 0 | Status: SHIPPED | OUTPATIENT
Start: 2021-05-03 | End: 2021-06-02

## 2021-05-03 RX ORDER — OXYCODONE AND ACETAMINOPHEN 7.5; 325 MG/1; MG/1
1 TABLET ORAL 2 TIMES DAILY PRN
Qty: 20 TABLET | Refills: 0 | Status: SHIPPED | OUTPATIENT
Start: 2021-05-03 | End: 2021-06-02

## 2021-05-03 RX ORDER — HYDROCHLOROTHIAZIDE 25 MG/1
25 TABLET ORAL DAILY
Qty: 90 TABLET | Refills: 0 | Status: SHIPPED | OUTPATIENT
Start: 2021-05-03 | End: 2021-06-02 | Stop reason: ALTCHOICE

## 2021-05-03 NOTE — PROGRESS NOTES
Jigna Amador is a 31 year old who is being evaluated via a billable telephone visit.      What phone number would you like to be contacted at? [phone]  How would you like to obtain your AVS? Maggy    Assessment & Plan     Hypertension goal BP (blood pressure) < 140/90  Due for an office recheck so we can see if the medication is actually controlling her blood pressure properly.  Previsit labs and follow-up next month.  Will change to hydrochlorothiazide as chlorthalidone not currently available.  - **Basic metabolic panel FUTURE anytime; Future  - lisinopril (ZESTRIL) 10 MG tablet; Take 1 tablet (10 mg) by mouth daily  - hydrochlorothiazide (HYDRODIURIL) 25 MG tablet; Take 1 tablet (25 mg) by mouth daily    Chronic pain syndrome  Needs an update on controlled substance agreement and urine drug screen which we will do with his upcoming visit.  - Drug Abuse Screen Panel 13, Urine (Care Map); Future    Breast pain  Pain ever since having open sores on her breast last year.  But the sores have healed I discussed that this is not likely a skin issue itself.  She wonders if it may be related to having large breasts and we discussed this.  Patient is interested in meeting with a specialist to talk about the possibility of breast reduction surgery.  We will have her meet with Dr. Long.    - oxyCODONE-acetaminophen (PERCOCET) 7.5-325 MG per tablet; Take 1 tablet by mouth 2 times daily as needed for pain  - PLASTIC SURGERY REFERRAL    Large breasts  As above  - PLASTIC SURGERY REFERRAL    Morbid obesity with BMI of 50.0-59.9, adult (H)  Again discussed diet and exercise measures to work on weight loss  - multivitamin (ONE-DAILY) tablet; Take 1 tablet by mouth daily    Dysmenorrhea    - oxyCODONE-acetaminophen (PERCOCET) 7.5-325 MG per tablet; Take 1 tablet by mouth 2 times daily as needed for pain       See Patient Instructions    Return in about 1 month (around 6/3/2021) for BP recheck, in office, Previsit  labwork.    Barbara Esquivel MD  [facility]    Barbara Amador is a 31 year old who presents for the following health issues     HPI       Medication Followup of Oxycodone     Taking Medication as prescribed: yes    Side Effects:  None    Medication Helping Symptoms:  yes     Met with patient today to discuss follow-up on hypertension as well as continued breast pain.  Wonders if this is because of breasts are so large and she wanted to know some details about breast reduction surgery.  Has not been on chlorthalidone because of some manufacturing issues not able to get.    Review of Systems   Constitutional, HEENT, cardiovascular, pulmonary, gi and gu systems are negative, except as otherwise noted.      Objective           Vitals:  No vitals were obtained today due to virtual visit.    Physical Exam   healthy, alert and no distress  PSYCH: Alert and oriented times 3; coherent speech, normal   rate and volume, able to articulate logical thoughts, able   to abstract reason, no tangential thoughts, no hallucinations   or delusions  Her affect is normal  RESP: No cough, no audible wheezing, able to talk in full sentences  Remainder of exam unable to be completed due to telephone visits    Past labs reviewed with the patient.             Phone call duration: 13 minutes

## 2021-05-13 ENCOUNTER — TELEPHONE (OUTPATIENT)
Dept: SURGERY | Facility: CLINIC | Age: 31
End: 2021-05-13

## 2021-05-13 NOTE — TELEPHONE ENCOUNTER
Patient is currently scheduled with general surgeon. Patient is needing to be seen for breast reduction consult with our plastic surgeon. Referral in chart for plastics.    Please call and reschedule.    Kristie Lobo LPN

## 2021-05-24 ENCOUNTER — MYC REFILL (OUTPATIENT)
Dept: FAMILY MEDICINE | Facility: CLINIC | Age: 31
End: 2021-05-24

## 2021-05-24 DIAGNOSIS — N64.4 BREAST PAIN: ICD-10-CM

## 2021-05-24 DIAGNOSIS — N94.6 DYSMENORRHEA: ICD-10-CM

## 2021-05-25 RX ORDER — OXYCODONE AND ACETAMINOPHEN 7.5; 325 MG/1; MG/1
1 TABLET ORAL 2 TIMES DAILY PRN
Qty: 20 TABLET | Refills: 0 | OUTPATIENT
Start: 2021-05-25

## 2021-05-25 NOTE — TELEPHONE ENCOUNTER
Refill denied.  Patient was informed at her last virtual visit that she needed an in person visit to update controlled substance agreement and to do a urine drug screen.  These have not been done yet.

## 2021-05-28 NOTE — TELEPHONE ENCOUNTER
Patient is scheduled for next week, patient would like for Dr. Esquivel to call patient regarding this medication request.  Edilberto Patel (AKA:  Argelia), , [facility], Primary Care

## 2021-05-29 DIAGNOSIS — N64.4 BREAST PAIN: ICD-10-CM

## 2021-05-29 DIAGNOSIS — N94.6 DYSMENORRHEA: ICD-10-CM

## 2021-05-29 RX ORDER — OXYCODONE AND ACETAMINOPHEN 7.5; 325 MG/1; MG/1
1 TABLET ORAL 2 TIMES DAILY PRN
Qty: 20 TABLET | Refills: 0 | Status: CANCELLED | OUTPATIENT
Start: 2021-05-29

## 2021-05-29 NOTE — TELEPHONE ENCOUNTER
Pt calling to request a refill of her oxycodone, for her breast pain, and pain from her boils.    Last filled:    oxyCODONE-acetaminophen (PERCOCET) 7.5-325 MG per tablet  1 tablet, 2 TIMES DAILY PRN 0 ordered  EditCancel Reorder       Summary: Take 1 tablet by mouth 2 times daily as needed for pain, Disp-20 tablet, R-0, E-Prescribe   Dose, Route, Frequency: 1 tablet, Oral, 2 TIMES DAILY PRN  Start: 5/3/2021  Ord/Sold: 5/3/2021 (O)  Report  Adh:   Taking:   Long-term:   Pharmacy: [id number] - [place], [place] - [address] [facility] Dose History       Patient Sig: Take 1 tablet by mouth 2 times daily as needed for pain       Ordered on: 5/3/2021       Authorized by: MORALES ESQUIVEL       Dispense: 20 tablet            Pt advised that request would be addressed on Tuesday. Pt instructed to call back if pain worsens, or to go to urgent care/or the ED if pain is unmanageable over the weekend.  Pt verbalized understanding.    Rx pended and routed rod Dr Esquivel.  Shireen Pina RN 05/29/21 11:08 AM  [facility] Nurse Advisor      COVID 19 Nurse Triage Plan/Patient Instructions    Please be aware that novel coronavirus (COVID-19) may be circulating in the community. If you develop symptoms such as fever, cough, or SOB or if you have concerns about the presence of another infection including coronavirus (COVID-19), please contact your health care provider or visit [email or web address].     Disposition/Instructions    Home care recommended. Follow home care protocol based instructions.    Thank you for taking steps to prevent the spread of this virus.  o Limit your contact with others.  o Wear a simple mask to cover your cough.  o Wash your hands well and often.    Resources    [facility]: About COVID-19: [email or web address]    CDC: What to Do If You're Sick: www.cdc.gov/coronavirus/2019-ncov/about/steps-when-sick.html    CDC: Ending Home Isolation:  www.cdc.gov/coronavirus/2019-ncov/hcp/disposition-in-home-patients.html     CDC: Caring for Someone: www.cdc.gov/coronavirus/2019-ncov/if-you-are-sick/care-for-someone.html     [facility]: Interim Guidance for Hospital Discharge to Home: www.[facility].[place]/diseases/coronavirus/hcp/hospdischarge.pdf    [facility] clinical trials (COVID-19 research studies): clinicalaffairs.[facility]/n-clinical-trials     Below are the COVID-19 hotlines at the [place] [facility] of [facility] ([facility]). Interpreters are available.   o For health questions: Call 234-606-0227 or 1-577.592.9137 (7 a.m. to 7 p.m.)  o For questions about schools and childcare: Call 567-516-6734 or 1-313.307.1792 (7 a.m. to 7 p.m.)

## 2021-06-01 NOTE — PROGRESS NOTES
Assessment & Plan     Hypertension goal BP (blood pressure) < 140/90  Blood pressure has improved a little with weight loss but still could be better.  We will combine the 2 medications into 1 and increase the lisinopril dosage.  Plan follow-up in a few months to continue to follow this.  - lisinopril-hydrochlorothiazide (ZESTORETIC) 20-25 MG tablet; Take 1 tablet by mouth daily  - Basic metabolic panel    Morbid obesity with BMI of 50.0-59.9, adult (H)  Down 20 pounds through diet and exercise.  Also planning breast reduction.    Chronic, continuous use of opioids  Renewed controlled substance agreement today.  [facility] database reviewed.  Follow-up every 3 months.  - Drug Abuse Screen Panel 13, Urine (Care Map)    Dysmenorrhea  As above  - oxyCODONE-acetaminophen (PERCOCET) 7.5-325 MG per tablet; Take 1 tablet by mouth 2 times daily as needed for pain  - levonorgest-eth estrad 91-Day (SEASONIQUE) 0.15-0.03 &0.01 MG tablet; Take 1 tablet by mouth daily    Breast pain  As above  - oxyCODONE-acetaminophen (PERCOCET) 7.5-325 MG per tablet; Take 1 tablet by mouth 2 times daily as needed for pain       See Patient Instructions    Return in about 3 months (around 9/2/2021) for Follow up on Pain, In Office or Video, can call for refills.    Barbara Esquivel MD  [facility] JONY Benito is a 31 year old who presents for the following health issues     HPI     Hypertension Follow-up      Do you check your blood pressure regularly outside of the clinic? No     Are you following a low salt diet? Yes    Are your blood pressures ever more than 140 on the top number (systolic) OR more   than 90 on the bottom number (diastolic), for example 140/90? unknown    Chronic Pain Follow-Up    Where in your body do you have pain? Right breast and back  How has your pain affected your ability to work? Sometimes affects it, but pt pushes through the pain  Which of these pain treatments have you tried  since your last clinic visit? None-referral for chiro  How well are you sleeping? Good when taking meds, poor when not  How has your mood been since your last visit? About the same  Have you had a significant life event? No  Other aggravating factors: incease in activity  Taking medication as directed? Yes    PHQ-9 SCORE 1/14/2019 3/2/2020 6/2/2021   PHQ-9 Total Score 13 12 8     JASON-7 SCORE 1/14/2019 3/2/2020   Total Score 13 10     No flowsheet data found.  Encounter-Level CSA:    There are no encounter-level csa.     Patient-Level CSA:    Controlled Substance Agreement - Opioid - Scan on 3/3/2020  7:08 AM: Opioid         How many servings of fruits and vegetables do you eat daily?  4 or more    On average, how many sweetened beverages do you drink each day (Examples: soda, juice, sweet tea, etc.  Do NOT count diet or artificially sweetened beverages)?   0-drinks fruit water    How many days per week do you exercise enough to make your heart beat faster? 7    How many minutes a day do you exercise enough to make your heart beat faster? 30 - 60    How many days per week do you miss taking your medication? 0    Here today in follow-up of blood pressure, breast pain, chronic pain.  Down about 20 pounds through diet and exercise and feeling good from that standpoint.  Would like to meet with a chiropractor to help with some upper back pain and is meeting with plastic surgery to discuss breast reduction.    Review of Systems   Constitutional, HEENT, cardiovascular, pulmonary, gi and gu systems are negative, except as otherwise noted.      Objective    Pulse 78   Temp 98.7  F (37.1  C) (Tympanic)   Resp 16   Wt 138.8 kg (306 lb)   BMI 50.15 kg/m    Body mass index is 50.15 kg/m .  Physical Exam   Alert, pleasant, upbeat, and in no apparent discomfort.  Obese  S1 and S2 normal, no murmurs, clicks, gallops or rubs. Regular rate and rhythm. Chest is clear; no wheezes or rales. No edema or JVD.  Past labs reviewed with  the patient.

## 2021-06-02 ENCOUNTER — OFFICE VISIT (OUTPATIENT)
Dept: FAMILY MEDICINE | Facility: CLINIC | Age: 31
End: 2021-06-02
Payer: COMMERCIAL

## 2021-06-02 VITALS — WEIGHT: 293 LBS | TEMPERATURE: 98.7 F | HEART RATE: 78 BPM | RESPIRATION RATE: 16 BRPM | BODY MASS INDEX: 50.15 KG/M2

## 2021-06-02 DIAGNOSIS — N94.6 DYSMENORRHEA: ICD-10-CM

## 2021-06-02 DIAGNOSIS — N64.4 BREAST PAIN: ICD-10-CM

## 2021-06-02 DIAGNOSIS — F11.90 CHRONIC, CONTINUOUS USE OF OPIOIDS: ICD-10-CM

## 2021-06-02 DIAGNOSIS — E66.01 MORBID OBESITY WITH BMI OF 50.0-59.9, ADULT (H): ICD-10-CM

## 2021-06-02 DIAGNOSIS — I10 HYPERTENSION GOAL BP (BLOOD PRESSURE) < 140/90: Primary | ICD-10-CM

## 2021-06-02 PROBLEM — R82.5 POSITIVE URINE DRUG SCREEN: Status: RESOLVED | Noted: 2018-07-06 | Resolved: 2021-06-02

## 2021-06-02 LAB
AMPHETAMINES UR QL: NOT DETECTED NG/ML
ANION GAP SERPL CALCULATED.3IONS-SCNC: 6 MMOL/L (ref 3–14)
BARBITURATES UR QL SCN: NOT DETECTED NG/ML
BENZODIAZ UR QL SCN: NOT DETECTED NG/ML
BUN SERPL-MCNC: 11 MG/DL (ref 7–30)
BUPRENORPHINE UR QL: NOT DETECTED NG/ML
CALCIUM SERPL-MCNC: 9.1 MG/DL (ref 8.5–10.1)
CANNABINOIDS UR QL: ABNORMAL NG/ML
CHLORIDE SERPL-SCNC: 105 MMOL/L (ref 94–109)
CO2 SERPL-SCNC: 25 MMOL/L (ref 20–32)
COCAINE UR QL SCN: NOT DETECTED NG/ML
CREAT SERPL-MCNC: 0.69 MG/DL (ref 0.52–1.04)
D-METHAMPHET UR QL: NOT DETECTED NG/ML
GFR SERPL CREATININE-BSD FRML MDRD: >90 ML/MIN/{1.73_M2}
GLUCOSE SERPL-MCNC: 96 MG/DL (ref 70–99)
METHADONE UR QL SCN: NOT DETECTED NG/ML
OPIATES UR QL SCN: NOT DETECTED NG/ML
OXYCODONE UR QL SCN: NOT DETECTED NG/ML
PCP UR QL SCN: NOT DETECTED NG/ML
POTASSIUM SERPL-SCNC: 4.4 MMOL/L (ref 3.4–5.3)
PROPOXYPH UR QL: NOT DETECTED NG/ML
SODIUM SERPL-SCNC: 136 MMOL/L (ref 133–144)
TRICYCLICS UR QL SCN: NOT DETECTED NG/ML

## 2021-06-02 PROCEDURE — 80048 BASIC METABOLIC PNL TOTAL CA: CPT | Performed by: FAMILY MEDICINE

## 2021-06-02 PROCEDURE — 80306 DRUG TEST PRSMV INSTRMNT: CPT | Performed by: FAMILY MEDICINE

## 2021-06-02 PROCEDURE — 36415 COLL VENOUS BLD VENIPUNCTURE: CPT | Performed by: FAMILY MEDICINE

## 2021-06-02 PROCEDURE — 99214 OFFICE O/P EST MOD 30 MIN: CPT | Performed by: FAMILY MEDICINE

## 2021-06-02 RX ORDER — OXYCODONE AND ACETAMINOPHEN 7.5; 325 MG/1; MG/1
1 TABLET ORAL 2 TIMES DAILY PRN
Qty: 20 TABLET | Refills: 0 | Status: SHIPPED | OUTPATIENT
Start: 2021-06-02 | End: 2021-06-25

## 2021-06-02 RX ORDER — LEVONORGESTREL / ETHINYL ESTRADIOL AND ETHINYL ESTRADIOL 150-30(84)
1 KIT ORAL DAILY
Qty: 91 TABLET | Refills: 1 | Status: SHIPPED | OUTPATIENT
Start: 2021-06-02 | End: 2021-10-27

## 2021-06-02 RX ORDER — LISINOPRIL AND HYDROCHLOROTHIAZIDE 20; 25 MG/1; MG/1
1 TABLET ORAL DAILY
Qty: 90 TABLET | Refills: 1 | Status: SHIPPED | OUTPATIENT
Start: 2021-06-02 | End: 2021-09-07

## 2021-06-02 ASSESSMENT — PATIENT HEALTH QUESTIONNAIRE - PHQ9: SUM OF ALL RESPONSES TO PHQ QUESTIONS 1-9: 8

## 2021-06-02 NOTE — LETTER
Opioid / Opioid Plus Controlled Substance Agreement    This is an agreement between you and your provider about the safe and appropriate use of controlled substance/opioids prescribed by your care team. Controlled substances are medicines that can cause physical and mental dependence (abuse).    There are strict laws about having and using these medicines. We here at [facility] are committing to working with you in your efforts to get better. To support you in this work, we ll help you schedule regular office appointments for medicine refills. If we must cancel or change your appointment for any reason, we ll make sure you have enough medicine to last until your next appointment.     As a Provider, I will:    Listen carefully to your concerns and treat you with respect.     Recommend a treatment plan that I believe is in your best interest. This plan may involve therapies other than opioid pain medication.     Talk with you often about the possible benefits, and the risk of harm of any medicine that we prescribe for you.     Provide a plan on how to taper (discontinue or go off) using this medicine if the decision is made to stop its use.    As a Patient, I understand that opioid(s):     Are a controlled substance prescribed by my care team to help me function or work and manage my condition(s).     Are strong medicines and can cause serious side effects such as:    Drowsiness, which can seriously affect my driving ability    A lower breathing rate, enough to cause death    Harm to my thinking ability     Depression     Abuse of and addiction to this medicine    Need to be taken exactly as prescribed. Combining opioids with certain medicines or chemicals (such as illegal drugs, sedatives, sleeping pills, and benzodiazepines) can be dangerous or even fatal. If I stop opioids suddenly, I may have severe withdrawal symptoms.    Do not work for all types of pain nor for all patients. If they re not helpful, I may  be asked to stop them.      The risks, benefits and side effects of these medicine(s) were explained to me. I agree that:  1. I will take part in other treatments as advised by my care team. This may be psychiatry or counseling, physical therapy, behavioral therapy, group treatment or a referral to a specialist.     2. I will keep all my appointments. I understand that this is part of the monitoring of opioids. My care team may require an office visit for EVERY opioid/controlled substance refill. If I miss appointments or don t follow instructions, my care team may stop my medicine.    3. I will take my medicines as prescribed. I will not change the dose or schedule unless my care team tells me to. There will be no refills if I run out early.     4. I may be asked to come to the clinic and complete a urine drug test or complete a pill count at any time. If I don t give a urine sample or participate in a pill count, the care team may stop my medicine.    5. I will only receive prescriptions from this clinic for chronic pain. If I am treated by another provider for acute pain issues, I will tell them that I am taking opioid pain medication for chronic pain and that I have a treatment agreement with this provider. I will inform my [facility] care team within one business day if I am given a prescription for any pain medication by another healthcare provider. My [facility] care team can contact other providers and pharmacists about my use of any medicines.    6. It is up to me to make sure that I don t run out of my medicines on weekends or holidays. If my care team is willing to refill my opioid prescription without a visit, I must request refills only during office hours. Refills may take up to 3 business days to process. I will use one pharmacy to fill all my opioid and other controlled substance prescriptions. I will notify the clinic about any changes to my insurance or medication  availability.    7. I am responsible for my prescriptions. If the medicine/prescription is lost, stolen or destroyed, it will not be replaced. I also agree not to share controlled substance medicines with anyone.    8. I am aware I should not use any illegal or recreational drugs. I agree not to drink alcohol unless my care team says I can.       9. If I enroll in the [place] Medical Cannabis program, I will tell my care team prior to my next refill.     10. I will tell my care team right away if I become pregnant, have a new medical problem treated outside of my regular clinic, or have a change in my medications.    11. I understand that this medicine can affect my thinking, judgment and reaction time. Alcohol and drugs affect the brain and body, which can affect the safety of my driving. Being under the influence of alcohol or drugs can affect my decision-making, behaviors, personal safety, and the safety of others. Driving while impaired (DWI) can occur if a person is driving, operating, or in physical control of a car, motorcycle, boat, snowmobile, ATV, motorbike, off-road vehicle, or any other motor vehicle ([place] Statute 169A.20). I understand the risk if I choose to drive or operate any vehicle or machinery.    I understand that if I do not follow any of the conditions above, my prescriptions or treatment may be stopped or changed.          Opioids  What You Need to Know    What are opioids?   Opioids are pain medicines that must be prescribed by a doctor. They are also known as narcotics.     Examples are:   1. morphine (MS Contin, Sara)  2. oxycodone (Oxycontin)  3. oxycodone and acetaminophen (Percocet)  4. hydrocodone and acetaminophen (Vicodin, Norco)   5. fentanyl patch (Duragesic)   6. hydromorphone (Dilaudid)   7. methadone  8. codeine (Tylenol #3)     What do opioids do well?   Opioids are best for severe short-term pain such as after a surgery or injury. They may work well for cancer pain. They may  help some people with long-lasting (chronic) pain.     What do opioids NOT do well?   Opioids never get rid of pain entirely, and they don t work well for most patients with chronic pain. Opioids don t reduce swelling, one of the causes of pain.                                    Other ways to manage chronic pain and improve function include:       Treat the health problem that may be causing pain    Anti-inflammation medicines, which reduce swelling and tenderness, such as ibuprofen (Advil, Motrin) or naproxen (Aleve)    Acetaminophen (Tylenol)    Antidepressants and anti-seizure medicines, especially for nerve pain    Topical treatments such as patches or creams    Injections or nerve blocks    Chiropractic or osteopathic treatment    Acupuncture, massage, deep breathing, meditation, visual imagery, aromatherapy    Use heat or ice at the pain site    Physical therapy     Exercise    Stop smoking    Take part in therapy       Risks and side effects     Talk to your doctor before you start or decide to keep taking opioids. Possible side effects include:      Lowering your breathing rate enough to cause death    Overdose, including death, especially if taking higher than prescribed doses    Worse depression symptoms; less pleasure in things you usually enjoy    Feeling tired or sluggish    Slower thoughts or cloudy thinking    Being more sensitive to pain over time; pain is harder to control    Trouble sleeping or restless sleep    Changes in hormone levels (for example, less testosterone)    Changes in sex drive or ability to have sex    Constipation    Unsafe driving    Itching and sweating    Dizziness    Nausea, throwing up and dry mouth    What else should I know about opioids?    Opioids may lead to dependence, tolerance, or addiction.      Dependence means that if you stop or reduce the medicine too quickly, you will have withdrawal symptoms. These include loose poop (diarrhea), jitters, flu-like symptoms,  nervousness and tremors. Dependence is not the same as addiction.                       Tolerance means needing higher doses over time to get the same effect. This may increase the chance of serious side effects.      Addiction is when people improperly use a substance that harms their body, their mind or their relations with others. Use of opiates can cause a relapse of addiction if you have a history of drug or alcohol abuse.      People who have used opioids for a long time may have a lower quality of life, worse depression, higher levels of pain and more visits to doctors.    You can overdose on opioids. Take these steps to lower your risk of overdose:    1. Recognize the signs:  Signs of overdose include decrease or loss of consciousness (blackout), slowed breathing, trouble waking up and blue lips. If someone is worried about overdose, they should call 911.    2. Talk to your doctor about Narcan (naloxone).   If you are at risk for overdose, you may be given a prescription for Narcan. This medicine very quickly reverses the effects of opioids.   If you overdose, a friend or family member can give you Narcan while waiting for the ambulance. They need to know the signs of overdose and how to give Narcan.     3. Don't use alcohol or street drugs.   Taking them with opioids can cause death.    4. Do not take any of these medicines unless your doctor says it s OK. Taking these with opioids can cause death:    Benzodiazepines, such as lorazepam (Ativan), alprazolam (Xanax) or diazepam (Valium)    Muscle relaxers, such as cyclobenzaprine (Flexeril)    Sleeping pills like zolpidem (Ambien)     Other opioids      How to keep you and other people safe while taking opioids:    1. Never share your opioids with others.  Opioid medicines are regulated by the Drug Enforcement Agency (QUIN[employer]). Selling or sharing medications is a criminal act.    2. Be sure to store opioids in a secure place, locked up if possible. Young children  can easily swallow them and overdose.    3. When you are traveling with your medicines, keep them in the original bottles. If you use a pill box, be sure you also carry a copy of your medicine list from your clinic or pharmacy.    4. Safe disposal of opioids    Most pharmacies have places to get rid of medicine, called disposal kiosks. Medicine disposal options are also available in every [facility]. Search your county and  medication disposal  to find more options. You can find more details at:  https://www.[facility].[place]./living-green/managing-unwanted-medications     I agree that my provider, clinic care team, and pharmacy may work with any city, state or federal law enforcement agency that investigates the misuse, sale, or other diversion of my controlled medicine. I will allow my provider to discuss my care with, or share a copy of, this agreement with any other treating provider, pharmacy or emergency room where I receive care.    I have read this agreement and have asked questions about anything I did not understand.    _______________________________________________________  Patient Signature - Jigna Amador _____________________                   Date     _______________________________________________________  Provider Signature - Barbara Esquivel MD   _____________________                   Date     _______________________________________________________  Witness Signature (required if provider not present while patient signing)   _____________________                   Date

## 2021-06-25 ENCOUNTER — TELEPHONE (OUTPATIENT)
Dept: FAMILY MEDICINE | Facility: CLINIC | Age: 31
End: 2021-06-25

## 2021-06-25 DIAGNOSIS — N94.6 DYSMENORRHEA: ICD-10-CM

## 2021-06-25 DIAGNOSIS — N64.4 BREAST PAIN: ICD-10-CM

## 2021-06-25 RX ORDER — OXYCODONE AND ACETAMINOPHEN 7.5; 325 MG/1; MG/1
1 TABLET ORAL 2 TIMES DAILY PRN
Qty: 20 TABLET | Refills: 0 | Status: SHIPPED | OUTPATIENT
Start: 2021-06-25 | End: 2021-07-15

## 2021-06-25 NOTE — TELEPHONE ENCOUNTER
Patient is requesting a refill of the medication, oxyCODONE-acetaminophen (PERCOCET) 7.5-325 MG per tablet.  Agustina Hirsch

## 2021-07-15 DIAGNOSIS — N64.4 BREAST PAIN: ICD-10-CM

## 2021-07-15 DIAGNOSIS — N94.6 DYSMENORRHEA: ICD-10-CM

## 2021-07-15 RX ORDER — OXYCODONE AND ACETAMINOPHEN 7.5; 325 MG/1; MG/1
1 TABLET ORAL 2 TIMES DAILY PRN
Qty: 20 TABLET | Refills: 0 | Status: SHIPPED | OUTPATIENT
Start: 2021-07-15 | End: 2021-08-02

## 2021-07-15 NOTE — TELEPHONE ENCOUNTER
Patient calling to request a refill oxyCODONE-acetaminophen (PERCOCET) 7.5-325 MG per tablet to be sent to [facility]    Patient requested it noted she is in a lot of pain

## 2021-08-02 ENCOUNTER — TELEPHONE (OUTPATIENT)
Dept: FAMILY MEDICINE | Facility: CLINIC | Age: 31
End: 2021-08-02

## 2021-08-02 DIAGNOSIS — N94.6 DYSMENORRHEA: ICD-10-CM

## 2021-08-02 DIAGNOSIS — N64.4 BREAST PAIN: ICD-10-CM

## 2021-08-02 RX ORDER — OXYCODONE AND ACETAMINOPHEN 7.5; 325 MG/1; MG/1
1 TABLET ORAL 2 TIMES DAILY PRN
Qty: 20 TABLET | Refills: 0 | Status: SHIPPED | OUTPATIENT
Start: 2021-08-02 | End: 2021-08-19

## 2021-08-02 NOTE — TELEPHONE ENCOUNTER
Patient calling that needs a refill of her Oxycodone to be sent to [facility] and that she wanted Dr Esquivel to know that the pharmacy only gave her #18 the last time.  Dina Harrison MA  [facility]  2nd Floor  Primary Care

## 2021-08-02 NOTE — TELEPHONE ENCOUNTER
Patient is calling stating that she has a boil on her breast and needs pain medication.  She is stating that she needs to take 3 or maybe 4 pills to get the pain to subside.  Please advise.  Thank you  Ruma Berrios

## 2021-08-02 NOTE — TELEPHONE ENCOUNTER
"See refill request  Provider refilled percocet x 1 and patient needs office visit or video visit for further refills    Routing to provider- patient is saying she needs \"take 3 or maybe 4 pills to get the pain to subside\"    Daquan Roberts RN  [facility]    "

## 2021-08-02 NOTE — TELEPHONE ENCOUNTER
Routing refill request to provider for review/approval because:  Drug not on the [facility] refill protocol.   See note below.       Sulema Ray RN  [facility]

## 2021-08-03 ENCOUNTER — VIRTUAL VISIT (OUTPATIENT)
Dept: FAMILY MEDICINE | Facility: CLINIC | Age: 31
End: 2021-08-03
Payer: COMMERCIAL

## 2021-08-03 DIAGNOSIS — F11.90 CHRONIC, CONTINUOUS USE OF OPIOIDS: ICD-10-CM

## 2021-08-03 DIAGNOSIS — N64.4 BREAST PAIN: Primary | ICD-10-CM

## 2021-08-03 PROCEDURE — 99213 OFFICE O/P EST LOW 20 MIN: CPT | Mod: 95 | Performed by: NURSE PRACTITIONER

## 2021-08-03 NOTE — PATIENT INSTRUCTIONS
NP will update patient on PCP recommendations for increased percocet count either today or tomorrow by phone or [employer] message    NP will also send message to PCP about wanting extra 2 pills to cover the full June script.

## 2021-08-03 NOTE — TELEPHONE ENCOUNTER
"Noted, but if she is using 3 to 4 pills a day for a flareup this should last 4 to 5 days.  And that is the plan.  We no longer provide a \"month worth\" of medications.  We only do short small prescriptions for a few days.  "

## 2021-08-03 NOTE — Clinical Note
Hi Dr. Esquivel: Patient requesting more percocet pill count per month. Is using percocet 3-4 times a day, everyday, not just during menstrual cycle when her pain flares more. Denies breast sores today. Please advise recommendations. Also see note about extra 2 pills she is requesting from June script where she got 18 instead of 20 pills.  Thank you,  WADE Mosqueda, NP-C  [facility]

## 2021-08-03 NOTE — TELEPHONE ENCOUNTER
Please ask patient to schedule a f/u with provider (office visit or video visit) before she runs out of meds     Daquan Roberts RN  [facility]

## 2021-08-03 NOTE — PROGRESS NOTES
Jigna is a 31 year old who is being evaluated via a   billable telephone visit      How would you like to obtain your AVS? Maggy  If the video visit is dropped, the invitation should be resent by: [phone]  Will anyone else be joining your video visit? No          Assessment & Plan     Breast pain/Chronic, continuous use of opioids  Patient states she is having on-going daily breast pain. When she gets her menses she gets boils that are very painful. She denies having boils today.  She has been using the percocet 7-325 mg tabs 1 pill 3-4 times a day, on a daily basis. She states she needs this much even when she does not have her menses. She has tried other medications which were not effective.  She just got a refill of 20 tabs 1 day ago. Advised to patient if she is using 3-4 pills on a daily basis not just during menstrual cycle, that is still a lot of pain medication. She has minimal constipation.      She also reports she would like a script for 2 more pills to complete the 20 pill script she was supposed to get on June 25th, states pharmacy didn't have enough to give her the full 20 tabs at that time.     -->Her main concern is she would like more pill count to help control her pain. Due to pain contract with her PCP, will route to him to get further recommendations. Will update patient once recommendations are made.       Return in about 3 months (around 11/3/2021) for Phone or Video visit okay, Medication check.    WADE Mosqueda, NP-C  [facility]   Jigna is a 31 year old who presents for the following health issues  accompanied by her self:    HPI     Having lots of breast pain. Feels like the pain is so bad like she has open sore but doesn't at this time. Pain is so bad at times she cannot wear a bra.  She is currently on percocet for her pain. She got a refill 1 day ago. She doesn't feel 20 pills is enough, it is Rx as 1 pill twice a day as needed. She will take up to  3-4 pills throughout the day for the pain. She states she needs to take this much on a daily basis.  She tries to spread it out.  When she gets her menses she also gets severe cramps which percocet helps with that also, she also gets boils on her breasts which are super painful. She has tried ibuprofen, muscle relaxer which also don't help as much as the percocet.     States she tried gabapentin but it didn't work. States she tried soma and that worked some. Do not see these medications in her medication history. Is having some constipation but will take something to help her go.     Had appointment end of June with general surgeon for breast reduction but cancelled appointment, next available isn't until end of December. She said she has an appointment and it is outside of [place].        She states she only got 18 pills last time, but clarified this was from June 25th. She would like a script to sign off on getting 2 extra pills from that fill when she only got 18 pills in June. The pharmacy just didn't have enough at that time.       Review of Systems   Constitutional, HEENT, cardiovascular, pulmonary, gi and gu systems are negative, except as otherwise noted.      Objective           Vitals:  No vitals were obtained today due to virtual visit.    Physical Exam   GENERAL: sounds healthy, alert and no acute distress  RESP: No audible wheeze, cough   PSYCH: Mentation sounds normal, affect normal/bright, judgement and insight intact, normal speech    n/a              Telephone visit: 13 minutes

## 2021-08-18 ENCOUNTER — TELEPHONE (OUTPATIENT)
Dept: FAMILY MEDICINE | Facility: CLINIC | Age: 31
End: 2021-08-18

## 2021-08-18 DIAGNOSIS — N94.6 DYSMENORRHEA: ICD-10-CM

## 2021-08-18 DIAGNOSIS — N64.4 BREAST PAIN: ICD-10-CM

## 2021-08-18 NOTE — TELEPHONE ENCOUNTER
Patient calling to request refill of her percocet.  Per last refill encounter note from Barbara Staley on 8/2/2021, refill was given x 1 and patient needed a visit before further refills.  Patient had a virtual visit with WADE Mosqueda CNP on 8/3/2021 but due to patient being in a pain contract with PCP, WADE Mosqueda CNP did not refill meds at that time and had discussed plan of care further with PCP.  See Veodiahart message from 8/4/2021 from WADE Mosqueda CNP after she was able to discuss further with Barbara Staley.    Patient requesting refill on her percocet for pain from boils.  Has 2 new boils, 1 on buttocks and another on labia.  Patient also requesting to have abx on hand in case these get worse    Daquan Roberts RN  [facility]

## 2021-08-19 RX ORDER — OXYCODONE AND ACETAMINOPHEN 7.5; 325 MG/1; MG/1
1 TABLET ORAL 2 TIMES DAILY PRN
Qty: 20 TABLET | Refills: 0 | Status: SHIPPED | OUTPATIENT
Start: 2021-08-19 | End: 2021-09-07

## 2021-09-07 ENCOUNTER — TELEPHONE (OUTPATIENT)
Dept: FAMILY MEDICINE | Facility: CLINIC | Age: 31
End: 2021-09-07

## 2021-09-07 DIAGNOSIS — I10 HYPERTENSION GOAL BP (BLOOD PRESSURE) < 140/90: ICD-10-CM

## 2021-09-07 DIAGNOSIS — N94.6 DYSMENORRHEA: ICD-10-CM

## 2021-09-07 DIAGNOSIS — N64.4 BREAST PAIN: ICD-10-CM

## 2021-09-07 RX ORDER — OXYCODONE AND ACETAMINOPHEN 7.5; 325 MG/1; MG/1
1 TABLET ORAL 2 TIMES DAILY PRN
Qty: 20 TABLET | Refills: 0 | Status: SHIPPED | OUTPATIENT
Start: 2021-09-07 | End: 2021-09-27

## 2021-09-07 RX ORDER — LISINOPRIL AND HYDROCHLOROTHIAZIDE 20; 25 MG/1; MG/1
1 TABLET ORAL DAILY
Qty: 30 TABLET | Refills: 0 | Status: SHIPPED | OUTPATIENT
Start: 2021-09-07 | End: 2021-10-27

## 2021-09-07 NOTE — TELEPHONE ENCOUNTER
Reason for Call:  Medication or medication refill:    Do you use a [facility]?  Name of the pharmacy and phone number for the current request:  [facility]    Name of the medication requested: Zestoretic and Percocet    Other request:     Can we leave a detailed message on this number? yes    Phone number patient can be reached at:  [phone]  Best Time: any    Call taken on 9/7/2021 at 1:06 PM by Ruma Pierce

## 2021-09-07 NOTE — TELEPHONE ENCOUNTER
Refilled x 1.  Needs visit (OFV or video visit) before any further refill.   In office visit to recheck BP

## 2021-09-07 NOTE — TELEPHONE ENCOUNTER
Routing refill request to provider for review/approval because:  Drug not on the [facility] refill protocol     Lily LNADISN, RN

## 2021-09-22 DIAGNOSIS — N94.6 DYSMENORRHEA: ICD-10-CM

## 2021-09-22 DIAGNOSIS — N64.4 BREAST PAIN: ICD-10-CM

## 2021-09-22 RX ORDER — OXYCODONE AND ACETAMINOPHEN 7.5; 325 MG/1; MG/1
1 TABLET ORAL 2 TIMES DAILY PRN
Qty: 20 TABLET | Refills: 0 | OUTPATIENT
Start: 2021-09-22

## 2021-09-23 NOTE — TELEPHONE ENCOUNTER
Patient is calling in for a refill on pain medication.    She is also having boils on her labia and buttocks.    Routing refill request to provider for review/approval because:  Drug not on the [facility] refill protocol     Carrie Bennett RN, [facility] Triage            
Refill denied - needs OFV - pain and BP recheck   
back pain general

## 2021-09-27 DIAGNOSIS — N64.4 BREAST PAIN: ICD-10-CM

## 2021-09-27 DIAGNOSIS — N94.6 DYSMENORRHEA: ICD-10-CM

## 2021-09-27 RX ORDER — OXYCODONE AND ACETAMINOPHEN 7.5; 325 MG/1; MG/1
1 TABLET ORAL 2 TIMES DAILY PRN
Qty: 20 TABLET | Refills: 0 | Status: SHIPPED | OUTPATIENT
Start: 2021-09-27 | End: 2021-10-27

## 2021-09-27 NOTE — TELEPHONE ENCOUNTER
I will send this refill in, but as per the previous messages she needs to set up an office visit to recheck on blood pressure.  So this will be the last refill without this visit.  Have her book it now since I am booked out a few weeks.

## 2021-09-27 NOTE — TELEPHONE ENCOUNTER
Patient called checking on status and also stated she needs the medication for pain and she has a boil under her arm

## 2021-10-03 ENCOUNTER — HEALTH MAINTENANCE LETTER (OUTPATIENT)
Age: 31
End: 2021-10-03

## 2021-10-15 NOTE — TELEPHONE ENCOUNTER
Patient called today to request oxycodone refill for her pain that is caused by the boil on her vaginal upper lip.    Informed patient that a boil needs to be treated with ABX post OV.  Patient said that she can't make to the 3:20 pm appointment offered with Zohra Benson.  Reminded on the message from Dr. Esquivel that she needs to be seen first for more refills.    Monet King RN

## 2021-10-24 NOTE — PROGRESS NOTES
"  Assessment & Plan     Hypertension goal BP (blood pressure) < 140/90  Has been out of her medication for a few weeks and she can tell. A bit more pressure in her head but no blurred vision or cardiac symptoms. Blood pressure here is very high. Not adequately controlled on her previous dosage so we will increase to 2 tablets daily and I like her to have a nurse only visit in a few weeks.  - lisinopril-hydrochlorothiazide (ZESTORETIC) 20-25 MG tablet; Take 2 tablets by mouth daily    Dysmenorrhea  Due for Pap smear and we will set this up at some point. She primarily uses this to help cut down on menorrhagia we discussed other methods. She might be a good candidate for progesterone IUD. She is now in her 30s and continues to smoke I would not feel comfortable using this beyond age 35 for her.  - levonorgest-eth estrad 91-Day (SEASONIQUE) 0.15-0.03 &0.01 MG tablet; Take 1 tablet by mouth daily  - oxyCODONE-acetaminophen (PERCOCET) 7.5-325 MG per tablet; Take 1 tablet by mouth 2 times daily as needed for pain    Breast pain  Stable and following  - oxyCODONE-acetaminophen (PERCOCET) 7.5-325 MG per tablet; Take 1 tablet by mouth 2 times daily as needed for pain    Chronic, continuous use of opioids  Reviewed  database. Up-to-date on urine drug screen and controlled substance agreement.         Tobacco Cessation:   reports that she has been smoking cigarettes. She has a 5.00 pack-year smoking history. She has never used smokeless tobacco.  Tobacco Cessation Action Plan: Shared Medical Visit / Group Education    BMI:   Estimated body mass index is 48.28 kg/m  as calculated from the following:    Height as of this encounter: 1.664 m (5' 5.5\").    Weight as of this encounter: 133.6 kg (294 lb 9.6 oz).   Weight management plan: Discussed healthy diet and exercise guidelines    Depression Screening Follow Up    PHQ 10/27/2021   PHQ-9 Total Score 15   Q9: Thoughts of better off dead/self-harm past 2 weeks Not at all " "        See Patient Instructions    Return in about 3 weeks (around 11/17/2021) for BP recheck, nurse only visit - any [place] clinic.    Barbara Esquivel MD  [facility] JONY Benito is a 31 year old who presents for the following health issues     History of Present Illness       Hypertension: She presents for follow up of hypertension.  She does not check blood pressure  regularly outside of the clinic. Outpatient blood pressures have not been over 140/90. She follows a low salt diet.     She eats 0-1 servings of fruits and vegetables daily.She consumes 1 sweetened beverage(s) daily.She exercises with enough effort to increase her heart rate 9 or less minutes per day.  She exercises with enough effort to increase her heart rate 3 or less days per week. She is missing 1 dose(s) of medications per week.           Here today primarily in follow-up of hypertension. Is due for blood pressure recheck. She has been out of her medication for a few weeks and was concerned about some recall she is on TD. We discussed those.    Review of Systems   Constitutional, HEENT, cardiovascular, pulmonary, gi and gu systems are negative, except as otherwise noted.      Objective    BP (!) 187/122 (BP Location: Right arm, Patient Position: Sitting, Cuff Size: Adult Regular)   Pulse 84   Temp 99.7  F (37.6  C) (Oral)   Resp 18   Ht 1.664 m (5' 5.5\")   Wt 133.6 kg (294 lb 9.6 oz)   LMP 10/26/2021   SpO2 100%   BMI 48.28 kg/m    Body mass index is 48.28 kg/m .  Physical Exam   Alert, pleasant, upbeat, and in no apparent discomfort.  Obese   S1 and S2 normal, no murmurs, clicks, gallops or rubs. Regular rate and rhythm. Chest is clear; no wheezes or rales. No edema or JVD.   Past labs reviewed with the patient.                 Answers for HPI/ROS submitted by the patient on 10/27/2021  If you checked off any problems, how difficult have these problems made it for you to do your work, take " care of things at home, or get along with other people?: Somewhat difficult  PHQ9 TOTAL SCORE: 15  JASON 7 TOTAL SCORE: 13

## 2021-10-27 ENCOUNTER — OFFICE VISIT (OUTPATIENT)
Dept: FAMILY MEDICINE | Facility: CLINIC | Age: 31
End: 2021-10-27
Payer: COMMERCIAL

## 2021-10-27 VITALS
BODY MASS INDEX: 47.09 KG/M2 | RESPIRATION RATE: 18 BRPM | WEIGHT: 293 LBS | HEART RATE: 84 BPM | TEMPERATURE: 99.7 F | DIASTOLIC BLOOD PRESSURE: 122 MMHG | OXYGEN SATURATION: 100 % | HEIGHT: 66 IN | SYSTOLIC BLOOD PRESSURE: 187 MMHG

## 2021-10-27 DIAGNOSIS — N64.4 BREAST PAIN: ICD-10-CM

## 2021-10-27 DIAGNOSIS — N94.6 DYSMENORRHEA: ICD-10-CM

## 2021-10-27 DIAGNOSIS — F11.90 CHRONIC, CONTINUOUS USE OF OPIOIDS: ICD-10-CM

## 2021-10-27 DIAGNOSIS — I10 HYPERTENSION GOAL BP (BLOOD PRESSURE) < 140/90: Primary | ICD-10-CM

## 2021-10-27 PROCEDURE — 99214 OFFICE O/P EST MOD 30 MIN: CPT | Performed by: FAMILY MEDICINE

## 2021-10-27 RX ORDER — OXYCODONE AND ACETAMINOPHEN 7.5; 325 MG/1; MG/1
1 TABLET ORAL 2 TIMES DAILY PRN
Qty: 30 TABLET | Refills: 0 | Status: SHIPPED | OUTPATIENT
Start: 2021-10-27 | End: 2021-11-19

## 2021-10-27 RX ORDER — LISINOPRIL AND HYDROCHLOROTHIAZIDE 20; 25 MG/1; MG/1
2 TABLET ORAL DAILY
Qty: 180 TABLET | Refills: 1 | Status: SHIPPED | OUTPATIENT
Start: 2021-10-27 | End: 2022-04-22

## 2021-10-27 RX ORDER — LEVONORGESTREL / ETHINYL ESTRADIOL AND ETHINYL ESTRADIOL 150-30(84)
1 KIT ORAL DAILY
Qty: 91 TABLET | Refills: 1 | Status: SHIPPED | OUTPATIENT
Start: 2021-10-27 | End: 2022-05-13

## 2021-10-27 ASSESSMENT — ANXIETY QUESTIONNAIRES
3. WORRYING TOO MUCH ABOUT DIFFERENT THINGS: SEVERAL DAYS
GAD7 TOTAL SCORE: 13
GAD7 TOTAL SCORE: 13
6. BECOMING EASILY ANNOYED OR IRRITABLE: MORE THAN HALF THE DAYS
1. FEELING NERVOUS, ANXIOUS, OR ON EDGE: MORE THAN HALF THE DAYS
7. FEELING AFRAID AS IF SOMETHING AWFUL MIGHT HAPPEN: MORE THAN HALF THE DAYS
8. IF YOU CHECKED OFF ANY PROBLEMS, HOW DIFFICULT HAVE THESE MADE IT FOR YOU TO DO YOUR WORK, TAKE CARE OF THINGS AT HOME, OR GET ALONG WITH OTHER PEOPLE?: SOMEWHAT DIFFICULT
7. FEELING AFRAID AS IF SOMETHING AWFUL MIGHT HAPPEN: MORE THAN HALF THE DAYS
2. NOT BEING ABLE TO STOP OR CONTROL WORRYING: MORE THAN HALF THE DAYS
GAD7 TOTAL SCORE: 13
5. BEING SO RESTLESS THAT IT IS HARD TO SIT STILL: MORE THAN HALF THE DAYS
4. TROUBLE RELAXING: MORE THAN HALF THE DAYS

## 2021-10-27 ASSESSMENT — PATIENT HEALTH QUESTIONNAIRE - PHQ9
10. IF YOU CHECKED OFF ANY PROBLEMS, HOW DIFFICULT HAVE THESE PROBLEMS MADE IT FOR YOU TO DO YOUR WORK, TAKE CARE OF THINGS AT HOME, OR GET ALONG WITH OTHER PEOPLE: SOMEWHAT DIFFICULT
SUM OF ALL RESPONSES TO PHQ QUESTIONS 1-9: 15
SUM OF ALL RESPONSES TO PHQ QUESTIONS 1-9: 15

## 2021-10-27 ASSESSMENT — PAIN SCALES - GENERAL: PAINLEVEL: SEVERE PAIN (7)

## 2021-10-27 ASSESSMENT — MIFFLIN-ST. JEOR: SCORE: 2050.11

## 2021-10-27 NOTE — PATIENT INSTRUCTIONS
I sent in more of the birth control medication.    Another option we could consider that can help with heavy cycles but does not have weight gain associated with it would be an IUD (Mirena vs Kyleena). These came out with FDA indication for birth control especially in folks that have very heavy menstrual cycles.    The other thing to consider is that we do not like to use birth control pills in people who smoke beyond age 35 because the risk of blood clot goes up.

## 2021-10-28 ASSESSMENT — PATIENT HEALTH QUESTIONNAIRE - PHQ9: SUM OF ALL RESPONSES TO PHQ QUESTIONS 1-9: 15

## 2021-10-28 ASSESSMENT — ANXIETY QUESTIONNAIRES: GAD7 TOTAL SCORE: 13

## 2021-11-18 DIAGNOSIS — N64.4 BREAST PAIN: ICD-10-CM

## 2021-11-18 DIAGNOSIS — I10 HYPERTENSION GOAL BP (BLOOD PRESSURE) < 140/90: ICD-10-CM

## 2021-11-18 DIAGNOSIS — N94.6 DYSMENORRHEA: ICD-10-CM

## 2021-11-18 RX ORDER — CHLORTHALIDONE 25 MG/1
TABLET ORAL
Qty: 15 TABLET | Refills: 0 | OUTPATIENT
Start: 2021-11-18

## 2021-11-18 RX ORDER — OXYCODONE AND ACETAMINOPHEN 7.5; 325 MG/1; MG/1
1 TABLET ORAL 2 TIMES DAILY PRN
Qty: 30 TABLET | Refills: 0 | OUTPATIENT
Start: 2021-11-18

## 2021-11-18 NOTE — TELEPHONE ENCOUNTER
Routing refill request to provider for review/approval because:  Drug not active on patient's medication list    Lily LANDISN, RN

## 2021-11-18 NOTE — TELEPHONE ENCOUNTER
Patient calling for a refill on her Oxycodone to be sent to [facility] in [place].  [facility]  2nd Floor  Primary Care

## 2021-11-18 NOTE — TELEPHONE ENCOUNTER
Routing refill request to provider for review/approval because:  Requested Prescriptions   Pending Prescriptions Disp Refills    oxyCODONE-acetaminophen (PERCOCET) 7.5-325 MG per tablet 30 tablet 0     Sig: Take 1 tablet by mouth 2 times daily as needed for pain        There is no refill protocol information for this order             Carrie LANDISN, RN

## 2021-11-19 ENCOUNTER — TELEPHONE (OUTPATIENT)
Dept: FAMILY MEDICINE | Facility: CLINIC | Age: 31
End: 2021-11-19
Payer: COMMERCIAL

## 2021-11-19 DIAGNOSIS — N94.6 DYSMENORRHEA: ICD-10-CM

## 2021-11-19 DIAGNOSIS — N64.4 BREAST PAIN: ICD-10-CM

## 2021-11-19 RX ORDER — OXYCODONE AND ACETAMINOPHEN 7.5; 325 MG/1; MG/1
1 TABLET ORAL 2 TIMES DAILY PRN
Qty: 30 TABLET | Refills: 0 | Status: SHIPPED | OUTPATIENT
Start: 2021-11-24 | End: 2021-11-24

## 2021-11-19 NOTE — TELEPHONE ENCOUNTER
.Reason for call:  Medication   If this is a refill request, has the caller requested the refill from the pharmacy already? No  Will the patient be using a [facility]? No  [facility]    Name of the medication requested: oxycodone    Other request: fill script    Phone number to reach patient:  Home number on file [phone] (home)    Best Time:  anytime    Can we leave a detailed message on this number?  YES    Travel screening: Negative

## 2021-11-19 NOTE — TELEPHONE ENCOUNTER
Denied - she was on this before we changed to zestoretic - which effectively has the same type of medication in it.

## 2021-11-24 RX ORDER — OXYCODONE AND ACETAMINOPHEN 7.5; 325 MG/1; MG/1
1 TABLET ORAL 2 TIMES DAILY PRN
Qty: 30 TABLET | Refills: 0 | Status: SHIPPED | OUTPATIENT
Start: 2021-11-24 | End: 2021-12-24

## 2021-11-24 NOTE — TELEPHONE ENCOUNTER
Pt calling. [facility] was unable to fill rx. Please send to [facility] in [place], [facility] ([address]).    Prescription for oxycodone.    Gina Alvarez,   [facility]

## 2021-12-24 DIAGNOSIS — N94.6 DYSMENORRHEA: ICD-10-CM

## 2021-12-24 DIAGNOSIS — N64.4 BREAST PAIN: ICD-10-CM

## 2021-12-24 RX ORDER — OXYCODONE AND ACETAMINOPHEN 7.5; 325 MG/1; MG/1
1 TABLET ORAL 2 TIMES DAILY PRN
Qty: 30 TABLET | Refills: 0 | Status: SHIPPED | OUTPATIENT
Start: 2021-12-24 | End: 2022-01-24

## 2021-12-24 NOTE — TELEPHONE ENCOUNTER
Routing refill request to provider for review/approval because:  Drug not on the [facility] refill protocol.    Amanda Phillips RN, BSN  [facility]

## 2021-12-24 NOTE — TELEPHONE ENCOUNTER
Patient calling for a refill on theOxycodone  to be sent to [facility].  [facility]  2nd Floor  Primary Care

## 2022-01-24 DIAGNOSIS — N64.4 BREAST PAIN: ICD-10-CM

## 2022-01-24 DIAGNOSIS — N94.6 DYSMENORRHEA: ICD-10-CM

## 2022-01-24 RX ORDER — OXYCODONE AND ACETAMINOPHEN 7.5; 325 MG/1; MG/1
1 TABLET ORAL 2 TIMES DAILY PRN
Qty: 30 TABLET | Refills: 0 | Status: SHIPPED | OUTPATIENT
Start: 2022-01-24 | End: 2022-02-24

## 2022-01-24 NOTE — TELEPHONE ENCOUNTER
.Reason for call:  Medication   If this is a refill request, has the caller requested the refill from the pharmacy already? No  Will the patient be using a [facility]? No  Name of the pharmacy and phone number for the current request: [facility]    Name of the medication requested: oxycodone    Other request: fill script    Phone number to reach patient:  Home number on file [phone] (home)    Best Time:  anytime    Can we leave a detailed message on this number?  YES    Travel screening: Negative

## 2022-02-17 PROBLEM — F11.90 CHRONIC, CONTINUOUS USE OF OPIOIDS: Status: ACTIVE | Noted: 2018-08-03

## 2022-02-24 DIAGNOSIS — N64.4 BREAST PAIN: ICD-10-CM

## 2022-02-24 DIAGNOSIS — N94.6 DYSMENORRHEA: ICD-10-CM

## 2022-02-24 RX ORDER — OXYCODONE AND ACETAMINOPHEN 7.5; 325 MG/1; MG/1
1 TABLET ORAL 2 TIMES DAILY PRN
Qty: 30 TABLET | Refills: 0 | Status: SHIPPED | OUTPATIENT
Start: 2022-02-24 | End: 2022-03-24

## 2022-02-24 NOTE — TELEPHONE ENCOUNTER
Patient calling for a refill request for Oxycodone-acetaminophen.  The patient had her last fill 1/24/22:    oxyCODONE-acetaminophen (PERCOCET) 7.5-325 MG per tablet    Route: Take 1 tablet by mouth 2 times daily as needed for pain - Oral     Dispensed 30 tablets 0 refills 1/24/2022       Patient is out of the medication, is ok with waiting until Dr. Esquivel returns to clinic tomorrow.    Leatha Peña RN, [facility]

## 2022-02-24 NOTE — TELEPHONE ENCOUNTER
Patient overdue for pain follow up - needs one every 3 months - virtual OK.    So if she schedules I will send refill

## 2022-03-24 ENCOUNTER — NURSE TRIAGE (OUTPATIENT)
Dept: NURSING | Facility: CLINIC | Age: 32
End: 2022-03-24
Payer: COMMERCIAL

## 2022-03-24 DIAGNOSIS — N94.6 DYSMENORRHEA: ICD-10-CM

## 2022-03-24 DIAGNOSIS — N64.4 BREAST PAIN: ICD-10-CM

## 2022-03-24 RX ORDER — OXYCODONE AND ACETAMINOPHEN 7.5; 325 MG/1; MG/1
1 TABLET ORAL 2 TIMES DAILY PRN
Qty: 30 TABLET | Refills: 0 | Status: SHIPPED | OUTPATIENT
Start: 2022-03-24 | End: 2022-04-25

## 2022-03-24 NOTE — TELEPHONE ENCOUNTER
Overdue for appt but has upcoming appointment scheduled  Will give 1 gemma refill  PDMP Review       Value Time User    State PDMP site checked  Yes 3/24/2022  9:34 AM Marlin Delgado MD

## 2022-03-24 NOTE — TELEPHONE ENCOUNTER
Routing refill request to provider for review/approval because:  Drug/ supplies not on the [facility] refill protocol     Lily Woodard BSN, RN

## 2022-03-25 NOTE — TELEPHONE ENCOUNTER
Jigna is inquiring about her refill request for Oxycodone  She states that pharmacy told her that they did not have an Rx order for her.    Chart review shows that Rx was ordered today and received by pharmacy at 9:36 am    7:08 pm - Spoke to Perry at [facility]. She reports that the Rx is ready for pick-up    Pt notified and location verified.    COVID 19 Nurse Triage Plan/Patient Instructions    Please be aware that novel coronavirus (COVID-19) may be circulating in the community. If you develop symptoms such as fever, cough, or SOB or if you have concerns about the presence of another infection including coronavirus (COVID-19), please contact your health care provider or visit [email or web address].     Disposition/Instructions    Home care recommended. Follow home care protocol based instructions.    Thank you for taking steps to prevent the spread of this virus.  o Limit your contact with others.  o Wear a simple mask to cover your cough.  o Wash your hands well and often.    Resources    [facility]: About COVID-19: [email or web address]    CDC: What to Do If You're Sick: www.cdc.gov/coronavirus/2019-ncov/about/steps-when-sick.html    CDC: Ending Home Isolation: www.cdc.gov/coronavirus/2019-ncov/hcp/disposition-in-home-patients.html     CDC: Caring for Someone: www.cdc.gov/coronavirus/2019-ncov/if-you-are-sick/care-for-someone.html     [facility]: Interim Guidance for Hospital Discharge to Home: www.health.[facility].mn.us/diseases/coronavirus/hcp/hospdischarge.pdf    [facility] clinical trials (COVID-19 research studies): [email or web address].[facility]/n-clinical-trials     Below are the COVID-19 hotlines at the [facility] ([facility]). Interpreters are available.   o For health questions: Call 485-371-5463 or 1-947.608.8124 (7 a.m. to 7 p.m.)  o For questions about schools and childcare: Call 322-688-0372 or 1-360.514.1789 (7 a.m. to 7 p.m.)     [facility]  [facility]      Reason for Disposition    Caller has medication question only, adult not sick, and triager answers question    Protocols used: MEDICATION QUESTION CALL-A-AH

## 2022-04-11 ENCOUNTER — VIRTUAL VISIT (OUTPATIENT)
Dept: FAMILY MEDICINE | Facility: CLINIC | Age: 32
End: 2022-04-11
Payer: COMMERCIAL

## 2022-04-11 ENCOUNTER — TELEPHONE (OUTPATIENT)
Dept: FAMILY MEDICINE | Facility: CLINIC | Age: 32
End: 2022-04-11

## 2022-04-11 DIAGNOSIS — Z53.9 ERRONEOUS ENCOUNTER--DISREGARD: Primary | ICD-10-CM

## 2022-04-22 DIAGNOSIS — I10 HYPERTENSION GOAL BP (BLOOD PRESSURE) < 140/90: ICD-10-CM

## 2022-04-22 DIAGNOSIS — N94.6 DYSMENORRHEA: ICD-10-CM

## 2022-04-22 DIAGNOSIS — N64.4 BREAST PAIN: ICD-10-CM

## 2022-04-22 RX ORDER — OXYCODONE AND ACETAMINOPHEN 7.5; 325 MG/1; MG/1
1 TABLET ORAL 2 TIMES DAILY PRN
Qty: 30 TABLET | Refills: 0 | OUTPATIENT
Start: 2022-04-22

## 2022-04-22 RX ORDER — LISINOPRIL AND HYDROCHLOROTHIAZIDE 20; 25 MG/1; MG/1
2 TABLET ORAL DAILY
Qty: 180 TABLET | Refills: 0 | Status: SHIPPED | OUTPATIENT
Start: 2022-04-22 | End: 2022-05-13

## 2022-04-22 NOTE — TELEPHONE ENCOUNTER
Refill of pain medication denied.  Patient no showed her last visit and she is due for a blood pressure and pain follow-up.

## 2022-04-22 NOTE — TELEPHONE ENCOUNTER
Routing refill request to provider for review/approval because:  Drug not on the [facility] refill protocol   BP Readings from Last 3 Encounters:   10/27/21 (!) 187/122   03/02/20 (!) 150/86   11/01/19 (!) 157/103     Amanda Phillips RN, BSN  [facility]

## 2022-04-23 ENCOUNTER — TELEPHONE (OUTPATIENT)
Dept: FAMILY MEDICINE | Facility: CLINIC | Age: 32
End: 2022-04-23
Payer: COMMERCIAL

## 2022-04-23 DIAGNOSIS — N64.4 BREAST PAIN: ICD-10-CM

## 2022-04-23 DIAGNOSIS — N94.6 DYSMENORRHEA: ICD-10-CM

## 2022-04-23 NOTE — TELEPHONE ENCOUNTER
Reason for Call:  Other prescription    Detailed comments: patient would like to refill 2 medications Oxycodone and BP medication prescribed by Sierra Simon at [facility] on Monday.  States that she cannot refill BP medication till June, 2022 but she is out of medication right now.  Would like to pickup on Monday at [facility].  Please contact patient.  Thank you.    Phone Number Patient can be reached at: Home number on file [phone] (home)    Best Time: any      Can we leave a detailed message on this number? YES    Call taken on 4/23/2022 at 2:43 PM by Yamini Arceo

## 2022-04-25 RX ORDER — OXYCODONE AND ACETAMINOPHEN 7.5; 325 MG/1; MG/1
1 TABLET ORAL 2 TIMES DAILY PRN
Qty: 30 TABLET | Refills: 0 | Status: SHIPPED | OUTPATIENT
Start: 2022-04-25 | End: 2022-05-13

## 2022-04-25 RX ORDER — OXYCODONE AND ACETAMINOPHEN 7.5; 325 MG/1; MG/1
1 TABLET ORAL 2 TIMES DAILY PRN
Qty: 30 TABLET | Refills: 0 | Status: CANCELLED | OUTPATIENT
Start: 2022-04-25

## 2022-04-25 NOTE — TELEPHONE ENCOUNTER
Attempted to contact patient to relay message and set up visit with PCP for 3 month follow up.   No answer, unable to leave , full mailbox.     Amanda Phillips RN, BSN  [facility]

## 2022-04-25 NOTE — TELEPHONE ENCOUNTER
Patient called back and was able to schedule her for virtual Follow-up.     Appointments in Next Year    May 13, 2022  9:40 AM  (Arrive by 9:20 AM)  Provider Visit with Barbara Esquivel MD  [facility] ([facility] ) [phone]        Courtney Cole RN [facility]

## 2022-04-25 NOTE — TELEPHONE ENCOUNTER
She has not had an appointment since last October.  So she is long overdue for a 3-month pain follow-up and she no showed her visit in April.  She is overdue for blood pressure recheck as well.  She has no appointments currently scheduled.  The 3-month follow-up is state of [place] rules, not a suggestion.  So unless she gets set up for a visit, including a virtual visit, I will not refill the medication.

## 2022-04-25 NOTE — TELEPHONE ENCOUNTER
Called patient, left message with phone number to call back.       Need to clarify what happened to the lisinopril-hydrochlorothiazide tabs.    1.  oxyCODONE-acetaminophen (PERCOCET) 7.5-325 MG per tablet  Take 1 tablet by mouth 2 times daily as needed for pain  Filled on 3/24/2022 for 30 tablet    2.  lisinopril-hydrochlorothiazide (ZESTORETIC) 20-25 MG tablet  Take 2 tablets by mouth daily  Filled on 4/22/22 for 180 tablets    Leatha Peña RN, [facility]

## 2022-04-25 NOTE — TELEPHONE ENCOUNTER
Patient returning call.    1. Advised that the lisinopril-hydrochlorothiazide was sent in for her on 4/22/22. Pt was unaware this was sent in and will call her pharmacy to confirm she can pick it up.     2. Pt requesting refill of her oxycodone. She states she is out of this. Was give 30 day supply on 3/24/22. Will route to provider for review.     Routing refill request to provider for review/approval because:  Drug not on the [facility] refill protocol.    Requested Prescriptions   Pending Prescriptions Disp Refills     oxyCODONE-acetaminophen (PERCOCET) 7.5-325 MG per tablet 30 tablet 0     Sig: Take 1 tablet by mouth 2 times daily as needed for pain       There is no refill protocol information for this order        Amanda Phillips RN, BSN  [facility]

## 2022-05-13 ENCOUNTER — TELEPHONE (OUTPATIENT)
Dept: FAMILY MEDICINE | Facility: CLINIC | Age: 32
End: 2022-05-13

## 2022-05-13 ENCOUNTER — VIRTUAL VISIT (OUTPATIENT)
Dept: FAMILY MEDICINE | Facility: CLINIC | Age: 32
End: 2022-05-13
Payer: COMMERCIAL

## 2022-05-13 DIAGNOSIS — F11.90 CHRONIC, CONTINUOUS USE OF OPIOIDS: ICD-10-CM

## 2022-05-13 DIAGNOSIS — Z13.220 SCREENING CHOLESTEROL LEVEL: ICD-10-CM

## 2022-05-13 DIAGNOSIS — N64.4 BREAST PAIN: ICD-10-CM

## 2022-05-13 DIAGNOSIS — I10 HYPERTENSION GOAL BP (BLOOD PRESSURE) < 140/90: Primary | ICD-10-CM

## 2022-05-13 DIAGNOSIS — N94.6 DYSMENORRHEA: ICD-10-CM

## 2022-05-13 DIAGNOSIS — E66.01 MORBID OBESITY WITH BMI OF 50.0-59.9, ADULT (H): ICD-10-CM

## 2022-05-13 PROCEDURE — 99214 OFFICE O/P EST MOD 30 MIN: CPT | Mod: 95 | Performed by: FAMILY MEDICINE

## 2022-05-13 RX ORDER — LISINOPRIL AND HYDROCHLOROTHIAZIDE 20; 25 MG/1; MG/1
2 TABLET ORAL DAILY
Qty: 180 TABLET | Refills: 1 | Status: SHIPPED | OUTPATIENT
Start: 2022-05-13 | End: 2022-06-15

## 2022-05-13 RX ORDER — LEVONORGESTREL / ETHINYL ESTRADIOL AND ETHINYL ESTRADIOL 150-30(84)
1 KIT ORAL DAILY
Qty: 91 TABLET | Refills: 1 | Status: SHIPPED | OUTPATIENT
Start: 2022-05-13 | End: 2022-10-19

## 2022-05-13 RX ORDER — OXYCODONE AND ACETAMINOPHEN 7.5; 325 MG/1; MG/1
1 TABLET ORAL 2 TIMES DAILY PRN
Qty: 30 TABLET | Refills: 0 | Status: SHIPPED | OUTPATIENT
Start: 2022-05-13 | End: 2022-06-06

## 2022-05-13 NOTE — PROGRESS NOTES
"Jigna is a 32 year old who is being evaluated via a billable video visit.      How would you like to obtain your AVS? MyChart  If the video visit is dropped, the invitation should be resent by: Text to cell phone: [phone]   Will anyone else be joining your video visit? No      Video Start Time: 0936    Assessment & Plan     Hypertension goal BP (blood pressure) < 140/90  Blood pressure not well controlled at all at last in office visit.  Patient had been out of the medication at the time but now she is back on and needs a recheck.  We will set this up.  Needs routine monitoring labs as well.  - lisinopril-hydrochlorothiazide (ZESTORETIC) 20-25 MG tablet; Take 2 tablets by mouth daily  - Basic metabolic panel; Future    Morbid obesity with BMI, adult (H)  Patient says she has been losing some weight but we do not have a quantity on this.  We will continue to follow as I think this is paramount to getting things under control.    Chronic, continuous use of opioids  Due for an updated controlled substance agreement and urine drug screen.  - Drug Abuse Screen Panel 13, Urine (Pain Care Package) - lab collect; Future    Dysmenorrhea  Stable on current regimen.  Continue same plan and routine follow-up.   - levonorgest-eth estrad 91-Day (SEASONIQUE) 0.15-0.03 &0.01 MG tablet; Take 1 tablet by mouth daily  - oxyCODONE-acetaminophen (PERCOCET) 7.5-325 MG per tablet; Take 1 tablet by mouth 2 times daily as needed for pain    Breast pain    - oxyCODONE-acetaminophen (PERCOCET) 7.5-325 MG per tablet; Take 1 tablet by mouth 2 times daily as needed for pain    Screening cholesterol level    - Lipid panel reflex to direct LDL Non-fasting; Future         BMI:   Estimated body mass index is 48.28 kg/m  as calculated from the following:    Height as of 10/27/21: 1.664 m (5' 5.5\").    Weight as of 10/27/21: 133.6 kg (294 lb 9.6 oz).   Weight management plan: Discussed healthy diet and exercise guidelines    See Patient " Instructions    Return in about 3 months (around 8/13/2022) for Follow up on Pain, BP recheck, in office.    Barbara Esquivel MD  [facility]    Barbara Benito is a 32 year old who presents for the following health issues     History of Present Illness       Reason for visit:  Recheck Medication    She eats 0-1 servings of fruits and vegetables daily.She consumes 0 sweetened beverage(s) daily.She exercises with enough effort to increase her heart rate 9 or less minutes per day.  She exercises with enough effort to increase her heart rate 3 or less days per week.   She is taking medications regularly.       Video visit with patient today to follow-up on hypertension and pain.  Doing okay overall.    Review of Systems   Constitutional, HEENT, cardiovascular, pulmonary, gi and gu systems are negative, except as otherwise noted.      Objective    Vitals - Patient Reported  Pain Score: Extreme Pain (8)  Pain Loc: Breast (Left Breast)      Vitals:  No vitals were obtained today due to virtual visit.    Physical Exam   GENERAL: Healthy, alert and no distress  EYES: Eyes grossly normal to inspection.  No discharge or erythema, or obvious scleral/conjunctival abnormalities.  RESP: No audible wheeze, cough, or visible cyanosis.  No visible retractions or increased work of breathing.    SKIN: Visible skin clear. No significant rash, abnormal pigmentation or lesions.  NEURO: Cranial nerves grossly intact.  Mentation and speech appropriate for age.  PSYCH: Mentation appears normal, affect normal/bright, judgement and insight intact, normal speech and appearance well-groomed.    Past labs reviewed with the patient.             Video-Visit Details    Type of service:  Video Visit    Video End Time:0942    Originating Location (pt. Location): Home    Distant Location (provider location):  [facility]     Platform used for Video Visit: Quorum

## 2022-05-13 NOTE — Clinical Note
Please mail a copy of controlled substance agreement (letters) to patient along with a stamped, return envelope.  Once it has been returned please send it to me for cosignature. BERTHA Esquivel M.D.

## 2022-05-13 NOTE — Clinical Note
I have ordered up lab work for patient and she needs a blood pressure recheck.  Can we contact her to set up a nonfasting lab appointment and an ancillary blood pressure recheck sometime in the next few weeks?

## 2022-05-13 NOTE — LETTER
Opioid / Opioid Plus Controlled Substance Agreement    This is an agreement between you and your provider about the safe and appropriate use of controlled substance/opioids prescribed by your care team. Controlled substances are medicines that can cause physical and mental dependence (abuse).    There are strict laws about having and using these medicines. We here at [facility] are committing to working with you in your efforts to get better. To support you in this work, we ll help you schedule regular office appointments for medicine refills. If we must cancel or change your appointment for any reason, we ll make sure you have enough medicine to last until your next appointment.     As a Provider, I will:    Listen carefully to your concerns and treat you with respect.     Recommend a treatment plan that I believe is in your best interest. This plan may involve therapies other than opioid pain medication.     Talk with you often about the possible benefits, and the risk of harm of any medicine that we prescribe for you.     Provide a plan on how to taper (discontinue or go off) using this medicine if the decision is made to stop its use.    As a Patient, I understand that opioid(s):     Are a controlled substance prescribed by my care team to help me function or work and manage my condition(s).     Are strong medicines and can cause serious side effects such as:    Drowsiness, which can seriously affect my driving ability    A lower breathing rate, enough to cause death    Harm to my thinking ability     Depression     Abuse of and addiction to this medicine    Need to be taken exactly as prescribed. Combining opioids with certain medicines or chemicals (such as illegal drugs, sedatives, sleeping pills, and benzodiazepines) can be dangerous or even fatal. If I stop opioids suddenly, I may have severe withdrawal symptoms.    Do not work for all types of pain nor for all patients. If they re not helpful, I may  be asked to stop them.      The risks, benefits and side effects of these medicine(s) were explained to me. I agree that:  1. I will take part in other treatments as advised by my care team. This may be psychiatry or counseling, physical therapy, behavioral therapy, group treatment or a referral to a specialist.     2. I will keep all my appointments. I understand that this is part of the monitoring of opioids. My care team may require an office visit for EVERY opioid/controlled substance refill. If I miss appointments or don t follow instructions, my care team may stop my medicine.    3. I will take my medicines as prescribed. I will not change the dose or schedule unless my care team tells me to. There will be no refills if I run out early.     4. I may be asked to come to the clinic and complete a urine drug test or complete a pill count at any time. If I don t give a urine sample or participate in a pill count, the care team may stop my medicine.    5. I will only receive prescriptions from this clinic for chronic pain. If I am treated by another provider for acute pain issues, I will tell them that I am taking opioid pain medication for chronic pain and that I have a treatment agreement with this provider. I will inform my [facility] care team within one business day if I am given a prescription for any pain medication by another healthcare provider. My [facility] care team can contact other providers and pharmacists about my use of any medicines.    6. It is up to me to make sure that I don t run out of my medicines on weekends or holidays. If my care team is willing to refill my opioid prescription without a visit, I must request refills only during office hours. Refills may take up to 3 business days to process. I will use one pharmacy to fill all my opioid and other controlled substance prescriptions. I will notify the clinic about any changes to my insurance or medication  availability.    7. I am responsible for my prescriptions. If the medicine/prescription is lost, stolen or destroyed, it will not be replaced. I also agree not to share controlled substance medicines with anyone.    8. I am aware I should not use any illegal or recreational drugs. I agree not to drink alcohol unless my care team says I can.       9. If I enroll in the [place] Medical Cannabis program, I will tell my care team prior to my next refill.     10. I will tell my care team right away if I become pregnant, have a new medical problem treated outside of my regular clinic, or have a change in my medications.    11. I understand that this medicine can affect my thinking, judgment and reaction time. Alcohol and drugs affect the brain and body, which can affect the safety of my driving. Being under the influence of alcohol or drugs can affect my decision-making, behaviors, personal safety, and the safety of others. Driving while impaired (DWI) can occur if a person is driving, operating, or in physical control of a car, motorcycle, boat, snowmobile, ATV, motorbike, off-road vehicle, or any other motor vehicle ([place] Statute 169A.20). I understand the risk if I choose to drive or operate any vehicle or machinery.    I understand that if I do not follow any of the conditions above, my prescriptions or treatment may be stopped or changed.          Opioids  What You Need to Know    What are opioids?   Opioids are pain medicines that must be prescribed by a doctor. They are also known as narcotics.     Examples are:   1. morphine (MS Contin, Sara)  2. oxycodone (Oxycontin)  3. oxycodone and acetaminophen (Percocet)  4. hydrocodone and acetaminophen (Vicodin, Norco)   5. fentanyl patch (Duragesic)   6. hydromorphone (Dilaudid)   7. methadone  8. codeine (Tylenol #3)     What do opioids do well?   Opioids are best for severe short-term pain such as after a surgery or injury. They may work well for cancer pain. They may  help some people with long-lasting (chronic) pain.     What do opioids NOT do well?   Opioids never get rid of pain entirely, and they don t work well for most patients with chronic pain. Opioids don t reduce swelling, one of the causes of pain.                                    Other ways to manage chronic pain and improve function include:       Treat the health problem that may be causing pain    Anti-inflammation medicines, which reduce swelling and tenderness, such as ibuprofen (Advil, Motrin) or naproxen (Aleve)    Acetaminophen (Tylenol)    Antidepressants and anti-seizure medicines, especially for nerve pain    Topical treatments such as patches or creams    Injections or nerve blocks    Chiropractic or osteopathic treatment    Acupuncture, massage, deep breathing, meditation, visual imagery, aromatherapy    Use heat or ice at the pain site    Physical therapy     Exercise    Stop smoking    Take part in therapy       Risks and side effects     Talk to your doctor before you start or decide to keep taking opioids. Possible side effects include:      Lowering your breathing rate enough to cause death    Overdose, including death, especially if taking higher than prescribed doses    Worse depression symptoms; less pleasure in things you usually enjoy    Feeling tired or sluggish    Slower thoughts or cloudy thinking    Being more sensitive to pain over time; pain is harder to control    Trouble sleeping or restless sleep    Changes in hormone levels (for example, less testosterone)    Changes in sex drive or ability to have sex    Constipation    Unsafe driving    Itching and sweating    Dizziness    Nausea, throwing up and dry mouth    What else should I know about opioids?    Opioids may lead to dependence, tolerance, or addiction.      Dependence means that if you stop or reduce the medicine too quickly, you will have withdrawal symptoms. These include loose poop (diarrhea), jitters, flu-like symptoms,  nervousness and tremors. Dependence is not the same as addiction.                       Tolerance means needing higher doses over time to get the same effect. This may increase the chance of serious side effects.      Addiction is when people improperly use a substance that harms their body, their mind or their relations with others. Use of opiates can cause a relapse of addiction if you have a history of drug or alcohol abuse.      People who have used opioids for a long time may have a lower quality of life, worse depression, higher levels of pain and more visits to doctors.    You can overdose on opioids. Take these steps to lower your risk of overdose:    1. Recognize the signs:  Signs of overdose include decrease or loss of consciousness (blackout), slowed breathing, trouble waking up and blue lips. If someone is worried about overdose, they should call 911.    2. Talk to your doctor about Narcan (naloxone).   If you are at risk for overdose, you may be given a prescription for Narcan. This medicine very quickly reverses the effects of opioids.   If you overdose, a friend or family member can give you Narcan while waiting for the ambulance. They need to know the signs of overdose and how to give Narcan.     3. Don't use alcohol or street drugs.   Taking them with opioids can cause death.    4. Do not take any of these medicines unless your doctor says it s OK. Taking these with opioids can cause death:    Benzodiazepines, such as lorazepam (Ativan), alprazolam (Xanax) or diazepam (Valium)    Muscle relaxers, such as cyclobenzaprine (Flexeril)    Sleeping pills like zolpidem (Ambien)     Other opioids      How to keep you and other people safe while taking opioids:    1. Never share your opioids with others.  Opioid medicines are regulated by the Drug Enforcement Agency (QUINN). Selling or sharing medications is a criminal act.    2. Be sure to store opioids in a secure place, locked up if possible. Young children  can easily swallow them and overdose.    3. When you are traveling with your medicines, keep them in the original bottles. If you use a pill box, be sure you also carry a copy of your medicine list from your clinic or pharmacy.    4. Safe disposal of opioids    Most pharmacies have places to get rid of medicine, called disposal kiosks. Medicine disposal options are also available in every [facility]. Search your county and  medication disposal  to find more options. You can find more details at:  [email or web address]-[email or web address]-[email or web address]-medications     I agree that my provider, clinic care team, and pharmacy may work with any city, state or federal law enforcement agency that investigates the misuse, sale, or other diversion of my controlled medicine. I will allow my provider to discuss my care with, or share a copy of, this agreement with any other treating provider, pharmacy or emergency room where I receive care.    I have read this agreement and have asked questions about anything I did not understand.    _______________________________________________________  Patient Signature - Jigna Amador _____________________                   Date     _______________________________________________________  Provider Signature - Barbara Esquivel MD   _____________________                   Date     _______________________________________________________  Witness Signature (required if provider not present while patient signing)   _____________________                   Date

## 2022-05-13 NOTE — TELEPHONE ENCOUNTER
Lvm for pt to schedule fasting labs appointment and bp check. I provided pt with clinic phone number to callback and schedule appointments.

## 2022-05-15 ENCOUNTER — HEALTH MAINTENANCE LETTER (OUTPATIENT)
Age: 32
End: 2022-05-15

## 2022-06-02 DIAGNOSIS — N94.6 DYSMENORRHEA: ICD-10-CM

## 2022-06-02 DIAGNOSIS — N64.4 BREAST PAIN: ICD-10-CM

## 2022-06-02 RX ORDER — OXYCODONE AND ACETAMINOPHEN 7.5; 325 MG/1; MG/1
1 TABLET ORAL 2 TIMES DAILY PRN
Qty: 30 TABLET | Refills: 0 | Status: CANCELLED | OUTPATIENT
Start: 2022-06-02

## 2022-06-02 NOTE — TELEPHONE ENCOUNTER
Reason for Call:  Medication or medication refill:    Do you use a [facility]?  Name of the pharmacy and phone number for the current request:  [facility]/PHARMACY #[id number] - [place] - [address]    Name of the medication requested: oxyCODONE-acetaminophen (PERCOCET) 7.5-325 MG per tablet    Other request: n/a    Can we leave a detailed message on this number? YES    Phone number patient can be reached at: Cell number on file:    Telephone Information:   Mobile [phone]       Best Time: Any    Call taken on 6/2/2022 at 2:46 PM by Timo Vigil

## 2022-06-02 NOTE — TELEPHONE ENCOUNTER
Routing refill request to provider for review/approval because:  Drug not on the G refill protocol     Requested Prescriptions   Pending Prescriptions Disp Refills     oxyCODONE-acetaminophen (PERCOCET) 7.5-325 MG per tablet 30 tablet 0     Sig: Take 1 tablet by mouth 2 times daily as needed for pain       There is no refill protocol information for this order        Amanda Phillips RN, BSN  [facility]

## 2022-06-03 NOTE — TELEPHONE ENCOUNTER
Spoke with patient, scheduled lab appt on 6/6/22, patient will complete the CSA at lab visit.  Leatha Berrios

## 2022-06-06 DIAGNOSIS — N64.4 BREAST PAIN: ICD-10-CM

## 2022-06-06 DIAGNOSIS — N94.6 DYSMENORRHEA: ICD-10-CM

## 2022-06-06 RX ORDER — OXYCODONE AND ACETAMINOPHEN 7.5; 325 MG/1; MG/1
1 TABLET ORAL 2 TIMES DAILY PRN
Qty: 10 TABLET | Refills: 0 | Status: SHIPPED | OUTPATIENT
Start: 2022-06-06 | End: 2022-06-10

## 2022-06-06 NOTE — TELEPHONE ENCOUNTER
Pt had lab appointment today, she will not make the appointment because her medical cab was not available. She has rescheduled for Wed at 1:45 pm.    Pt requesting message to Dr. Esquivel.  Can you approve her medications today because she has been in pain for a few days?  She has been out for one month.  She did  the 5/13/22 prescription but was taking more than two a day due to pain so ran out.  Pt requesting refill now.    Suyapa LANDISN, RN

## 2022-06-08 ENCOUNTER — LAB (OUTPATIENT)
Dept: LAB | Facility: CLINIC | Age: 32
End: 2022-06-08
Payer: COMMERCIAL

## 2022-06-08 DIAGNOSIS — F11.90 CHRONIC, CONTINUOUS USE OF OPIOIDS: ICD-10-CM

## 2022-06-08 DIAGNOSIS — Z13.220 SCREENING CHOLESTEROL LEVEL: ICD-10-CM

## 2022-06-08 DIAGNOSIS — I10 HYPERTENSION GOAL BP (BLOOD PRESSURE) < 140/90: ICD-10-CM

## 2022-06-08 LAB
AMPHETAMINES UR QL: NOT DETECTED
ANION GAP SERPL CALCULATED.3IONS-SCNC: 5 MMOL/L (ref 3–14)
BARBITURATES UR QL SCN: NOT DETECTED
BENZODIAZ UR QL SCN: NOT DETECTED
BUN SERPL-MCNC: 14 MG/DL (ref 7–30)
BUPRENORPHINE UR QL: NOT DETECTED
CALCIUM SERPL-MCNC: 9.1 MG/DL (ref 8.5–10.1)
CANNABINOIDS UR QL: DETECTED
CHLORIDE BLD-SCNC: 106 MMOL/L (ref 94–109)
CHOLEST SERPL-MCNC: 109 MG/DL
CO2 SERPL-SCNC: 30 MMOL/L (ref 20–32)
COCAINE UR QL SCN: NOT DETECTED
CREAT SERPL-MCNC: 0.81 MG/DL (ref 0.52–1.04)
D-METHAMPHET UR QL: NOT DETECTED
FASTING STATUS PATIENT QL REPORTED: NO
GFR SERPL CREATININE-BSD FRML MDRD: >90 ML/MIN/1.73M2
GLUCOSE BLD-MCNC: 115 MG/DL (ref 70–99)
HDLC SERPL-MCNC: 48 MG/DL
LDLC SERPL CALC-MCNC: 44 MG/DL
METHADONE UR QL SCN: NOT DETECTED
NONHDLC SERPL-MCNC: 61 MG/DL
OPIATES UR QL SCN: NOT DETECTED
OXYCODONE UR QL SCN: DETECTED
PCP UR QL SCN: NOT DETECTED
POTASSIUM BLD-SCNC: 3.7 MMOL/L (ref 3.4–5.3)
PROPOXYPH UR QL: NOT DETECTED
SODIUM SERPL-SCNC: 141 MMOL/L (ref 133–144)
TRICYCLICS UR QL SCN: NOT DETECTED
TRIGL SERPL-MCNC: 86 MG/DL

## 2022-06-08 PROCEDURE — 80306 DRUG TEST PRSMV INSTRMNT: CPT

## 2022-06-08 PROCEDURE — 80048 BASIC METABOLIC PNL TOTAL CA: CPT

## 2022-06-08 PROCEDURE — 80061 LIPID PANEL: CPT

## 2022-06-08 PROCEDURE — 36415 COLL VENOUS BLD VENIPUNCTURE: CPT

## 2022-06-09 ENCOUNTER — TELEPHONE (OUTPATIENT)
Dept: FAMILY MEDICINE | Facility: CLINIC | Age: 32
End: 2022-06-09
Payer: COMMERCIAL

## 2022-06-09 DIAGNOSIS — N64.4 BREAST PAIN: ICD-10-CM

## 2022-06-09 DIAGNOSIS — N94.6 DYSMENORRHEA: ICD-10-CM

## 2022-06-09 NOTE — TELEPHONE ENCOUNTER
Patient calling requesting to know if the rest of her prescription for oxycodone can be sent into her pharmacy for her?     Per chart, no more refills until CSA and UDS done.     Pt reports she did labs yesterday.     Routing to provider to please review/adivse.     Pt would like call back with advisement.     Amanda Phillips RN, BSN  [facility]

## 2022-06-10 RX ORDER — OXYCODONE AND ACETAMINOPHEN 7.5; 325 MG/1; MG/1
1 TABLET ORAL 2 TIMES DAILY PRN
Qty: 6 TABLET | Refills: 0 | Status: SHIPPED | OUTPATIENT
Start: 2022-06-10 | End: 2022-07-11

## 2022-06-10 NOTE — TELEPHONE ENCOUNTER
Reportedly has signed contract per staff and UDS completed.   Positive THC - not disqualifying per PCP notation  Will give script for weekend and PCP can assess for additional refill when due.    ANKITAK

## 2022-06-15 ENCOUNTER — TELEPHONE (OUTPATIENT)
Dept: FAMILY MEDICINE | Facility: CLINIC | Age: 32
End: 2022-06-15
Payer: COMMERCIAL

## 2022-06-15 DIAGNOSIS — I10 HYPERTENSION GOAL BP (BLOOD PRESSURE) < 140/90: ICD-10-CM

## 2022-06-15 RX ORDER — HYDROCHLOROTHIAZIDE 50 MG/1
50 TABLET ORAL DAILY
Qty: 90 TABLET | Refills: 0 | Status: SHIPPED | OUTPATIENT
Start: 2022-06-15 | End: 2022-09-13

## 2022-06-15 NOTE — TELEPHONE ENCOUNTER
Okay.  Lisinopril removed from her medication list and added to her allergies.  Lets try the hydrochlorothiazide with a follow-up in about a month

## 2022-06-15 NOTE — TELEPHONE ENCOUNTER
Patient calling to report to Dr. Esquivel, Pt had an allergic reaction to the medication lisinopril-hydrochlorothiazide (ZESTORETIC) 20-25 MG tablet. Patient was seen at [facility] on 6/14/22, with Symptoms of painful swollen lips. Patient was advised to get a new blood pressure medication from PCP. Do you need to see your patient for this before issuing a new medication? There are No openings until next Wednesday 6/22/22 for a Same Day.    Call patient to advise, can leave a detailed message.  Leatha Berrios

## 2022-06-15 NOTE — TELEPHONE ENCOUNTER
Writer spoke with patient, she did give verbal consent to look at her [facility] records through Care Everywhere

## 2022-06-15 NOTE — TELEPHONE ENCOUNTER
Sounds like they are thinking angioedema related to her lisinopril.  But I cannot access any of the records.  Could we get an update to care everywhere verbally so we can see those records?  I would like to update her chart accordingly.    In the meantime I am going to send in a prescription for a stronger dosage of hydrochlorothiazide.  But ideally I want to see the records first so I know what they think she is allergic to

## 2022-06-16 NOTE — TELEPHONE ENCOUNTER
Lvm for pt to schedule follow up appointment with PCP regarding new medication. I provided clinic number for pt to call back and schedule.

## 2022-06-16 NOTE — TELEPHONE ENCOUNTER
Patient calling back, scheduled a new med follow up visit on 7/18/22, per Dr. Esquivel to f/u in 1 month. Informed pt the new medication was sent to her pharmacy.  Patient would like a phone call from Dr. Esquivel at [phone], says she needs to talk to you.    Leatha Berrios

## 2022-06-16 NOTE — TELEPHONE ENCOUNTER
I am not in the office today but we could call patient and find out exactly what her question is.  She could also send through a [employer] message and I can respond to it.

## 2022-06-21 NOTE — TELEPHONE ENCOUNTER
TT pt, advised that she would like to talk to Dr. Esquivel about her current pain medication. She would like to discuss dosages and the quantity she is receiving. She advised that she will also be sending a [employer] message directly to Dr. Esquivel as well.

## 2022-06-21 NOTE — TELEPHONE ENCOUNTER
That is fine.  She can certainly send me a message.  Preferably an E-visit if something specific is needed.

## 2022-07-09 DIAGNOSIS — N94.6 DYSMENORRHEA: ICD-10-CM

## 2022-07-09 DIAGNOSIS — N64.4 BREAST PAIN: ICD-10-CM

## 2022-07-09 NOTE — TELEPHONE ENCOUNTER
Reason for Call:  Medication or medication refill:    Do you use a [facility]?  Name of the pharmacy and phone number for the current request:  [facility]    Name of the medication requested: oxycodone     Other request: n/a    Can we leave a detailed message on this number? YES    Phone number patient can be reached at: Home number on file [phone] (home) or Work number on file:  There is no work phone number on file.    Best Time: anytime    Call taken on 7/9/2022 at 1:28 PM by Jahaira Restrepo

## 2022-07-11 RX ORDER — OXYCODONE AND ACETAMINOPHEN 7.5; 325 MG/1; MG/1
1 TABLET ORAL 2 TIMES DAILY PRN
Qty: 30 TABLET | Refills: 0 | Status: SHIPPED | OUTPATIENT
Start: 2022-07-11 | End: 2022-08-19

## 2022-07-11 NOTE — TELEPHONE ENCOUNTER
Refill sent.  I believe patient turned in CSa when she did UDS, but still not entered into chart.  Can we look into why that is, bc we are failing the measure until properly entered.

## 2022-07-20 DIAGNOSIS — N94.6 DYSMENORRHEA: ICD-10-CM

## 2022-07-20 DIAGNOSIS — N64.4 BREAST PAIN: ICD-10-CM

## 2022-07-20 RX ORDER — OXYCODONE AND ACETAMINOPHEN 7.5; 325 MG/1; MG/1
1 TABLET ORAL 2 TIMES DAILY PRN
Qty: 30 TABLET | Refills: 0 | OUTPATIENT
Start: 2022-07-20

## 2022-07-20 NOTE — TELEPHONE ENCOUNTER
Writer spoke with patient, gave message below from Dr. Esquivel.  Patient stated she just wanted to transfer her prescription that was already approved as she is out of town for a few days.  She stated she would  prescription that was already approved when she was back in town

## 2022-08-17 DIAGNOSIS — N64.4 BREAST PAIN: ICD-10-CM

## 2022-08-17 DIAGNOSIS — N94.6 DYSMENORRHEA: ICD-10-CM

## 2022-08-17 DIAGNOSIS — I10 HYPERTENSION GOAL BP (BLOOD PRESSURE) < 140/90: ICD-10-CM

## 2022-08-17 NOTE — TELEPHONE ENCOUNTER
Medication Question or Refill    Contacts       Type Contact Phone/Fax    08/17/2022 01:19 PM CDT Phone (Incoming) PerryYolara MONI (Self) [phone]          What medication are you calling about (include dose and sig)?: hydrochlorothiazide, saeasonigue and percocet    Controlled Substance Agreement on file:   CSA -- Patient Level:    Controlled Substance Agreement - Opioid - Scan on 6/25/2021  2:59 PM  Controlled Substance Agreement - Opioid - Scan on 3/3/2020  7:08 AM: Opioid       Who prescribed the medication?: Dr Galvan    Do you need a refill? Yes:     When did you use the medication last? N/A    Patient offered an appointment? No    Do you have any questions or concerns?  No    Preferred Pharmacy:     [facility]/pharmacy [address] [place] [id number]  Phone: [phone] Fax: [phone]      Could we send this information to you in [facility] or would you prefer to receive a phone call?:   No preference   Okay to leave a detailed message?: Yes at Other phone number:  [phone]    Dasha Berrios

## 2022-08-19 RX ORDER — OXYCODONE AND ACETAMINOPHEN 7.5; 325 MG/1; MG/1
1 TABLET ORAL 2 TIMES DAILY PRN
Qty: 30 TABLET | Refills: 0 | OUTPATIENT
Start: 2022-08-19

## 2022-08-19 RX ORDER — OXYCODONE AND ACETAMINOPHEN 7.5; 325 MG/1; MG/1
1 TABLET ORAL 2 TIMES DAILY PRN
Qty: 30 TABLET | Refills: 0 | Status: SHIPPED | OUTPATIENT
Start: 2022-08-19 | End: 2022-09-13

## 2022-08-19 RX ORDER — LEVONORGESTREL / ETHINYL ESTRADIOL AND ETHINYL ESTRADIOL 150-30(84)
1 KIT ORAL DAILY
Qty: 91 TABLET | Refills: 1 | OUTPATIENT
Start: 2022-08-19

## 2022-08-19 RX ORDER — HYDROCHLOROTHIAZIDE 50 MG/1
50 TABLET ORAL DAILY
Qty: 90 TABLET | Refills: 0 | OUTPATIENT
Start: 2022-08-19

## 2022-08-19 NOTE — TELEPHONE ENCOUNTER
**Scheduled for next visit with you 9/6/22    Routing refill request to provider for review/approval because:  Drug not on the [facility] refill protocol     Melany Armstrong RN

## 2022-09-11 ENCOUNTER — HEALTH MAINTENANCE LETTER (OUTPATIENT)
Age: 32
End: 2022-09-11

## 2022-09-13 DIAGNOSIS — N94.6 DYSMENORRHEA: ICD-10-CM

## 2022-09-13 DIAGNOSIS — I10 HYPERTENSION GOAL BP (BLOOD PRESSURE) < 140/90: ICD-10-CM

## 2022-09-13 DIAGNOSIS — N64.4 BREAST PAIN: ICD-10-CM

## 2022-09-13 RX ORDER — HYDROCHLOROTHIAZIDE 50 MG/1
50 TABLET ORAL DAILY
Qty: 90 TABLET | Refills: 0 | Status: SHIPPED | OUTPATIENT
Start: 2022-09-13 | End: 2023-01-10

## 2022-09-13 RX ORDER — OXYCODONE AND ACETAMINOPHEN 7.5; 325 MG/1; MG/1
1 TABLET ORAL 2 TIMES DAILY PRN
Qty: 30 TABLET | Refills: 0 | Status: SHIPPED | OUTPATIENT
Start: 2022-09-13 | End: 2022-10-18

## 2022-09-13 NOTE — TELEPHONE ENCOUNTER
Patient calling to request refills of the following medications:       Disp Refills Start End JAYE   hydrochlorothiazide (HYDRODIURIL) 50 MG tablet 90 tablet 0 6/15/2022          Disp Refills Start End JAYE   oxyCODONE-acetaminophen (PERCOCET) 7.5-325 MG per tablet 30 tablet 0 8/19/2022  No   Sig - Route: Take 1 tablet by mouth 2 times daily as needed for pain -      Patient has 4 tablets left of hydrochlorothiazide 50mg and no Percocet left.    Patient is requesting that refills be sent to:   [facility] on [address] [place] [address]    Routing to  Refill Pool    Jessica Campbell RN  09/13/22 1:57 PM  [facility] Nurse Advisor

## 2022-10-10 DIAGNOSIS — N94.6 DYSMENORRHEA: ICD-10-CM

## 2022-10-10 DIAGNOSIS — N64.4 BREAST PAIN: ICD-10-CM

## 2022-10-10 NOTE — TELEPHONE ENCOUNTER
Reason for Call:  Other call back    Detailed comments: PT NEEDS MED REFILL oxyCODONE-acetaminophen (PERCOCET) 7.5-325 MG per tablet  SEND TO [address] [place] [facility]    Phone Number Patient can be reached at: Cell number on file:    Telephone Information:   Mobile [phone]       Best Time: ANYTIME    Can we leave a detailed message on this number? YES    Call taken on 10/10/2022 at 1:57 PM by Kade Loyd

## 2022-10-11 NOTE — TELEPHONE ENCOUNTER
She has been overdue for a pain follow-up since August.  I see she has an appointment scheduled in December but we cannot wait until then.  Her controlled substance agreement  in .  She had one mailed to her but did not send it back.  I know I have same-day availability and virtual appointments before December.  So she needs to make 1 of those within the next few weeks and then I will send a refill.  And if she does not have a copy of that controlled substance agreement I will enter a new 1

## 2022-10-18 RX ORDER — OXYCODONE AND ACETAMINOPHEN 7.5; 325 MG/1; MG/1
1 TABLET ORAL 2 TIMES DAILY PRN
Qty: 10 TABLET | Refills: 0 | Status: SHIPPED | OUTPATIENT
Start: 2022-10-18 | End: 2022-10-19

## 2022-10-18 NOTE — TELEPHONE ENCOUNTER
Patient states she has an appointment 10/19, patient is in a lot of pain, wondering if she can get enough til her appointment       Carrie MULLEN, RN

## 2022-10-18 NOTE — TELEPHONE ENCOUNTER
We will send in only a few tablets.  We will address tomorrow at the appointment.  She tends to cancel her appointments when I refill early.

## 2022-10-19 ENCOUNTER — VIRTUAL VISIT (OUTPATIENT)
Dept: FAMILY MEDICINE | Facility: CLINIC | Age: 32
End: 2022-10-19
Payer: COMMERCIAL

## 2022-10-19 DIAGNOSIS — E66.01 MORBID OBESITY WITH BMI OF 50.0-59.9, ADULT (H): ICD-10-CM

## 2022-10-19 DIAGNOSIS — I10 HYPERTENSION GOAL BP (BLOOD PRESSURE) < 140/90: Primary | ICD-10-CM

## 2022-10-19 DIAGNOSIS — N62 LARGE BREASTS: ICD-10-CM

## 2022-10-19 DIAGNOSIS — N64.4 BREAST PAIN: ICD-10-CM

## 2022-10-19 DIAGNOSIS — L20.9 ATOPIC DERMATITIS, UNSPECIFIED TYPE: ICD-10-CM

## 2022-10-19 DIAGNOSIS — N94.6 DYSMENORRHEA: ICD-10-CM

## 2022-10-19 PROCEDURE — 99214 OFFICE O/P EST MOD 30 MIN: CPT | Mod: 95 | Performed by: FAMILY MEDICINE

## 2022-10-19 RX ORDER — TRIAMCINOLONE ACETONIDE 1 MG/G
CREAM TOPICAL 3 TIMES DAILY PRN
Qty: 45 G | Refills: 0 | Status: SHIPPED | OUTPATIENT
Start: 2022-10-19 | End: 2023-06-14

## 2022-10-19 RX ORDER — OXYCODONE AND ACETAMINOPHEN 7.5; 325 MG/1; MG/1
1 TABLET ORAL 2 TIMES DAILY PRN
Qty: 30 TABLET | Refills: 0 | Status: SHIPPED | OUTPATIENT
Start: 2022-10-24 | End: 2022-11-14

## 2022-10-19 RX ORDER — LEVONORGESTREL / ETHINYL ESTRADIOL AND ETHINYL ESTRADIOL 150-30(84)
1 KIT ORAL DAILY
Qty: 91 TABLET | Refills: 1 | Status: SHIPPED | OUTPATIENT
Start: 2022-10-19 | End: 2022-11-14

## 2022-10-19 ASSESSMENT — ANXIETY QUESTIONNAIRES
1. FEELING NERVOUS, ANXIOUS, OR ON EDGE: NEARLY EVERY DAY
1. FEELING NERVOUS, ANXIOUS, OR ON EDGE: NEARLY EVERY DAY
5. BEING SO RESTLESS THAT IT IS HARD TO SIT STILL: NEARLY EVERY DAY
IF YOU CHECKED OFF ANY PROBLEMS ON THIS QUESTIONNAIRE, HOW DIFFICULT HAVE THESE PROBLEMS MADE IT FOR YOU TO DO YOUR WORK, TAKE CARE OF THINGS AT HOME, OR GET ALONG WITH OTHER PEOPLE: NOT DIFFICULT AT ALL
GAD7 TOTAL SCORE: 18
IF YOU CHECKED OFF ANY PROBLEMS ON THIS QUESTIONNAIRE, HOW DIFFICULT HAVE THESE PROBLEMS MADE IT FOR YOU TO DO YOUR WORK, TAKE CARE OF THINGS AT HOME, OR GET ALONG WITH OTHER PEOPLE: NOT DIFFICULT AT ALL
3. WORRYING TOO MUCH ABOUT DIFFERENT THINGS: NEARLY EVERY DAY
7. FEELING AFRAID AS IF SOMETHING AWFUL MIGHT HAPPEN: NOT AT ALL
2. NOT BEING ABLE TO STOP OR CONTROL WORRYING: NEARLY EVERY DAY
GAD7 TOTAL SCORE: 18
5. BEING SO RESTLESS THAT IT IS HARD TO SIT STILL: NEARLY EVERY DAY
7. FEELING AFRAID AS IF SOMETHING AWFUL MIGHT HAPPEN: NOT AT ALL
7. FEELING AFRAID AS IF SOMETHING AWFUL MIGHT HAPPEN: NOT AT ALL
8. IF YOU CHECKED OFF ANY PROBLEMS, HOW DIFFICULT HAVE THESE MADE IT FOR YOU TO DO YOUR WORK, TAKE CARE OF THINGS AT HOME, OR GET ALONG WITH OTHER PEOPLE?: NOT DIFFICULT AT ALL
3. WORRYING TOO MUCH ABOUT DIFFERENT THINGS: NEARLY EVERY DAY
GAD7 TOTAL SCORE: 18
GAD7 TOTAL SCORE: 18
4. TROUBLE RELAXING: NEARLY EVERY DAY
4. TROUBLE RELAXING: NEARLY EVERY DAY
2. NOT BEING ABLE TO STOP OR CONTROL WORRYING: NEARLY EVERY DAY
6. BECOMING EASILY ANNOYED OR IRRITABLE: NEARLY EVERY DAY
6. BECOMING EASILY ANNOYED OR IRRITABLE: NEARLY EVERY DAY

## 2022-10-19 ASSESSMENT — PATIENT HEALTH QUESTIONNAIRE - PHQ9
10. IF YOU CHECKED OFF ANY PROBLEMS, HOW DIFFICULT HAVE THESE PROBLEMS MADE IT FOR YOU TO DO YOUR WORK, TAKE CARE OF THINGS AT HOME, OR GET ALONG WITH OTHER PEOPLE: NOT DIFFICULT AT ALL
SUM OF ALL RESPONSES TO PHQ QUESTIONS 1-9: 24
SUM OF ALL RESPONSES TO PHQ QUESTIONS 1-9: 24

## 2022-10-19 NOTE — PROGRESS NOTES
Jigna is a 32 year old who is being evaluated via a billable video visit.       How would you like to obtain your AVS? MyChart  If the video visit is dropped, the invitation should be resent by: Send to e-mail at: [email or web address] 3-D Display for Smartphones  Will anyone else be joining your video visit? No          Assessment & Plan     Hypertension goal BP (blood pressure) < 140/90  Interval history reviewed with patient.  She developed angioedema related to lisinopril.  Changed from Zestoretic to just hydrochlorothiazide at a dose currently at 50 mg daily.  Has also lost significant weight, she thinks close to 100 pounds.  We do not have a recent blood pressure reading on file so we will set up an ancillary visit for blood pressure and weight and make adjustments accordingly.    Morbid obesity with BMI of 50.0-59.9, adult (H)  Should be down significantly and we will reassess    Dysmenorrhea  Stable on current regimen.  Continue same plan and routine follow-up.   - oxyCODONE-acetaminophen (PERCOCET) 7.5-325 MG per tablet; Take 1 tablet by mouth 2 times daily as needed for pain  - levonorgest-eth estrad 91-Day (SEASONIQUE) 0.15-0.03 &0.01 MG tablet; Take 1 tablet by mouth daily    Breast pain  We are going to look into breast reduction now that she has lost some significant weight  - oxyCODONE-acetaminophen (PERCOCET) 7.5-325 MG per tablet; Take 1 tablet by mouth 2 times daily as needed for pain    Large breasts  As above  - Adult Plastic Surgery  Referral; Future    Atopic dermatitis, unspecified type    - triamcinolone (KENALOG) 0.1 % external cream; Apply topically 3 times daily as needed for irritation         Nicotine/Tobacco Cessation:  She reports that she has been smoking cigarettes. She has a 5.00 pack-year smoking history. She has been exposed to tobacco smoke. She has never used smokeless tobacco.  Nicotine/Tobacco Cessation Plan:   Self help information given to patient      Depression Screening Follow Up    PHQ  10/19/2022   PHQ-9 Total Score 24   Q9: Thoughts of better off dead/self-harm past 2 weeks Not at all     Last PHQ-9 10/19/2022   1.  Little interest or pleasure in doing things 3   2.  Feeling down, depressed, or hopeless 3   3.  Trouble falling or staying asleep, or sleeping too much 3   4.  Feeling tired or having little energy 3   5.  Poor appetite or overeating 3   6.  Feeling bad about yourself 3   7.  Trouble concentrating 3   8.  Moving slowly or restless 3   Q9: Thoughts of better off dead/self-harm past 2 weeks 0   PHQ-9 Total Score 24   Difficulty at work, home, or with people -       Discussed scores and patient says she has a lot going on in life.  Not currently interested in antidepressant therapy.    See Patient Instructions    Return in about 3 months (around 1/19/2023) for Follow up on Pain, In Office or Video, can call for refills.    Barbara Esquivel MD  [facility]   Jigna is a 32 year old, presenting for the following health issues:  Back Pain      History of Present Illness       Back Pain:  She presents for follow up of back pain. Patient's back pain is a chronic problem.  Location of back pain:  Right middle of back and left middle of back  Description of back pain: stabbing  Back pain spreads: right shoulder and left shoulder    Since patient first noticed back pain, pain is: rapidly worsening  Does back pain interfere with her job:  No      She eats 2-3 servings of fruits and vegetables daily.She consumes 0 sweetened beverage(s) daily.She exercises with enough effort to increase her heart rate 60 or more minutes per day.  She exercises with enough effort to increase her heart rate 3 or less days per week.   She is taking medications regularly.    Today's PHQ-9         PHQ-9 Total Score: 24    PHQ-9 Q9 Thoughts of better off dead/self-harm past 2 weeks :   Not at all    How difficult have these problems made it for you to do your work, take care of  things at home, or get along with other people: Not difficult at all  Today's JASON-7 Score: 18         Here today in follow-up of hypertension and breast pain.    Review of Systems   Constitutional, HEENT, cardiovascular, pulmonary, gi and gu systems are negative, except as otherwise noted.      Objective    Vitals - Patient Reported  Pain Score: Worst Pain (10)  Pain Loc: Low Back        Physical Exam   GENERAL: Healthy, alert and no distress  EYES: Eyes grossly normal to inspection.  No discharge or erythema, or obvious scleral/conjunctival abnormalities.  RESP: No audible wheeze, cough, or visible cyanosis.  No visible retractions or increased work of breathing.    SKIN: Visible skin clear. No significant rash, abnormal pigmentation or lesions.  NEURO: Cranial nerves grossly intact.  Mentation and speech appropriate for age.  PSYCH: Mentation appears normal, affect normal/bright, judgement and insight intact, normal speech and appearance well-groomed.    Past labs reviewed with the patient.             Video-Visit Details    Video Start Time: 0909    Type of service:  Video Visit    Video End Time:0916    Originating Location (pt. Location): Home        Distant Location (provider location):  On-site    Platform used for Video Visit: ChikiINMAN

## 2022-10-19 NOTE — Clinical Note
1) please set her up for a nurse visit to recheck on blood pressure and weight sometime in the next couple of weeks.  Then call patient to let her know. 2) Please mail a copy of controlled substance agreement (letters) to patient along with a stamped, return envelope.  Once it has been returned please send it to me for cosignature. BERTHA Esquivel M.D.

## 2022-10-19 NOTE — LETTER
Opioid / Opioid Plus Controlled Substance Agreement    This is an agreement between you and your provider about the safe and appropriate use of controlled substance/opioids prescribed by your care team. Controlled substances are medicines that can cause physical and mental dependence (abuse).    There are strict laws about having and using these medicines. We here at [facility] are committing to working with you in your efforts to get better. To support you in this work, we ll help you schedule regular office appointments for medicine refills. If we must cancel or change your appointment for any reason, we ll make sure you have enough medicine to last until your next appointment.     As a Provider, I will:    Listen carefully to your concerns and treat you with respect.     Recommend a treatment plan that I believe is in your best interest. This plan may involve therapies other than opioid pain medication.     Talk with you often about the possible benefits, and the risk of harm of any medicine that we prescribe for you.     Provide a plan on how to taper (discontinue or go off) using this medicine if the decision is made to stop its use.    As a Patient, I understand that opioid(s):     Are a controlled substance prescribed by my care team to help me function or work and manage my condition(s).     Are strong medicines and can cause serious side effects such as:    Drowsiness, which can seriously affect my driving ability    A lower breathing rate, enough to cause death    Harm to my thinking ability     Depression     Abuse of and addiction to this medicine    Need to be taken exactly as prescribed. Combining opioids with certain medicines or chemicals (such as illegal drugs, sedatives, sleeping pills, and benzodiazepines) can be dangerous or even fatal. If I stop opioids suddenly, I may have severe withdrawal symptoms.    Do not work for all types of pain nor for all patients. If they re not helpful, I may  be asked to stop them.      The risks, benefits and side effects of these medicine(s) were explained to me. I agree that:  1. I will take part in other treatments as advised by my care team. This may be psychiatry or counseling, physical therapy, behavioral therapy, group treatment or a referral to a specialist.     2. I will keep all my appointments. I understand that this is part of the monitoring of opioids. My care team may require an office visit for EVERY opioid/controlled substance refill. If I miss appointments or don t follow instructions, my care team may stop my medicine.    3. I will take my medicines as prescribed. I will not change the dose or schedule unless my care team tells me to. There will be no refills if I run out early.     4. I may be asked to come to the clinic and complete a urine drug test or complete a pill count at any time. If I don t give a urine sample or participate in a pill count, the care team may stop my medicine.    5. I will only receive prescriptions from this clinic for chronic pain. If I am treated by another provider for acute pain issues, I will tell them that I am taking opioid pain medication for chronic pain and that I have a treatment agreement with this provider. I will inform my [facility] care team within one business day if I am given a prescription for any pain medication by another healthcare provider. My [facility] care team can contact other providers and pharmacists about my use of any medicines.    6. It is up to me to make sure that I don t run out of my medicines on weekends or holidays. If my care team is willing to refill my opioid prescription without a visit, I must request refills only during office hours. Refills may take up to 3 business days to process. I will use one pharmacy to fill all my opioid and other controlled substance prescriptions. I will notify the clinic about any changes to my insurance or medication  availability.    7. I am responsible for my prescriptions. If the medicine/prescription is lost, stolen or destroyed, it will not be replaced. I also agree not to share controlled substance medicines with anyone.    8. I am aware I should not use any illegal or recreational drugs. I agree not to drink alcohol unless my care team says I can.       9. If I enroll in the [place] [facility] Cannabis program, I will tell my care team prior to my next refill.     10. I will tell my care team right away if I become pregnant, have a new medical problem treated outside of my regular clinic, or have a change in my medications.    11. I understand that this medicine can affect my thinking, judgment and reaction time. Alcohol and drugs affect the brain and body, which can affect the safety of my driving. Being under the influence of alcohol or drugs can affect my decision-making, behaviors, personal safety, and the safety of others. Driving while impaired (DWI) can occur if a person is driving, operating, or in physical control of a car, motorcycle, boat, snowmobile, ATV, motorbike, off-road vehicle, or any other motor vehicle ([place] Statute 169A.20). I understand the risk if I choose to drive or operate any vehicle or machinery.    I understand that if I do not follow any of the conditions above, my prescriptions or treatment may be stopped or changed.          Opioids  What You Need to Know    What are opioids?   Opioids are pain medicines that must be prescribed by a doctor. They are also known as narcotics.     Examples are:   1. morphine (MS Contin, Sara)  2. oxycodone (Oxycontin)  3. oxycodone and acetaminophen (Percocet)  4. hydrocodone and acetaminophen (Vicodin, Norco)   5. fentanyl patch (Duragesic)   6. hydromorphone (Dilaudid)   7. methadone  8. codeine (Tylenol #3)     What do opioids do well?   Opioids are best for severe short-term pain such as after a surgery or injury. They may work well for cancer pain. They may  help some people with long-lasting (chronic) pain.     What do opioids NOT do well?   Opioids never get rid of pain entirely, and they don t work well for most patients with chronic pain. Opioids don t reduce swelling, one of the causes of pain.                                    Other ways to manage chronic pain and improve function include:       Treat the health problem that may be causing pain    Anti-inflammation medicines, which reduce swelling and tenderness, such as ibuprofen (Advil, Motrin) or naproxen (Aleve)    Acetaminophen (Tylenol)    Antidepressants and anti-seizure medicines, especially for nerve pain    Topical treatments such as patches or creams    Injections or nerve blocks    Chiropractic or osteopathic treatment    Acupuncture, massage, deep breathing, meditation, visual imagery, aromatherapy    Use heat or ice at the pain site    Physical therapy     Exercise    Stop smoking    Take part in therapy       Risks and side effects     Talk to your doctor before you start or decide to keep taking opioids. Possible side effects include:      Lowering your breathing rate enough to cause death    Overdose, including death, especially if taking higher than prescribed doses    Worse depression symptoms; less pleasure in things you usually enjoy    Feeling tired or sluggish    Slower thoughts or cloudy thinking    Being more sensitive to pain over time; pain is harder to control    Trouble sleeping or restless sleep    Changes in hormone levels (for example, less testosterone)    Changes in sex drive or ability to have sex    Constipation    Unsafe driving    Itching and sweating    Dizziness    Nausea, throwing up and dry mouth    What else should I know about opioids?    Opioids may lead to dependence, tolerance, or addiction.      Dependence means that if you stop or reduce the medicine too quickly, you will have withdrawal symptoms. These include loose poop (diarrhea), jitters, flu-like symptoms,  nervousness and tremors. Dependence is not the same as addiction.                       Tolerance means needing higher doses over time to get the same effect. This may increase the chance of serious side effects.      Addiction is when people improperly use a substance that harms their body, their mind or their relations with others. Use of opiates can cause a relapse of addiction if you have a history of drug or alcohol abuse.      People who have used opioids for a long time may have a lower quality of life, worse depression, higher levels of pain and more visits to doctors.    You can overdose on opioids. Take these steps to lower your risk of overdose:    1. Recognize the signs:  Signs of overdose include decrease or loss of consciousness (blackout), slowed breathing, trouble waking up and blue lips. If someone is worried about overdose, they should call 911.    2. Talk to your doctor about Narcan (naloxone).   If you are at risk for overdose, you may be given a prescription for Narcan. This medicine very quickly reverses the effects of opioids.   If you overdose, a friend or family member can give you Narcan while waiting for the ambulance. They need to know the signs of overdose and how to give Narcan.     3. Don't use alcohol or street drugs.   Taking them with opioids can cause death.    4. Do not take any of these medicines unless your doctor says it s OK. Taking these with opioids can cause death:    Benzodiazepines, such as lorazepam (Ativan), alprazolam (Xanax) or diazepam (Valium)    Muscle relaxers, such as cyclobenzaprine (Flexeril)    Sleeping pills like zolpidem (Ambien)     Other opioids      How to keep you and other people safe while taking opioids:    1. Never share your opioids with others.  Opioid medicines are regulated by the Drug Enforcement Agency (QUINN). Selling or sharing medications is a criminal act.    2. Be sure to store opioids in a secure place, locked up if possible. Young children  can easily swallow them and overdose.    3. When you are traveling with your medicines, keep them in the original bottles. If you use a pill box, be sure you also carry a copy of your medicine list from your clinic or pharmacy.    4. Safe disposal of opioids    Most pharmacies have places to get rid of medicine, called disposal kiosks. Medicine disposal options are also available in every [facility]. Search your county and  medication disposal  to find more options. You can find more details at:  [email or web address]-green/managing-unwanted-medications     I agree that my provider, clinic care team, and pharmacy may work with any city, state or federal law enforcement agency that investigates the misuse, sale, or other diversion of my controlled medicine. I will allow my provider to discuss my care with, or share a copy of, this agreement with any other treating provider, pharmacy or emergency room where I receive care.    I have read this agreement and have asked questions about anything I did not understand.    _______________________________________________________  Patient Signature - Jigna Amador _____________________                   Date     _______________________________________________________  Provider Signature - Barbara Esquivel MD   _____________________                   Date     _______________________________________________________  Witness Signature (required if provider not present while patient signing)   _____________________                   Date

## 2022-11-10 ENCOUNTER — ALLIED HEALTH/NURSE VISIT (OUTPATIENT)
Dept: FAMILY MEDICINE | Facility: CLINIC | Age: 32
End: 2022-11-10
Payer: COMMERCIAL

## 2022-11-10 VITALS
HEART RATE: 85 BPM | SYSTOLIC BLOOD PRESSURE: 143 MMHG | DIASTOLIC BLOOD PRESSURE: 90 MMHG | BODY MASS INDEX: 44.57 KG/M2 | WEIGHT: 272 LBS

## 2022-11-10 DIAGNOSIS — N64.4 BREAST PAIN: ICD-10-CM

## 2022-11-10 DIAGNOSIS — N94.6 DYSMENORRHEA: ICD-10-CM

## 2022-11-10 DIAGNOSIS — I10 HYPERTENSION GOAL BP (BLOOD PRESSURE) < 140/90: Primary | ICD-10-CM

## 2022-11-10 PROCEDURE — 99207 PR NO CHARGE LOS: CPT

## 2022-11-10 RX ORDER — OXYCODONE AND ACETAMINOPHEN 7.5; 325 MG/1; MG/1
1 TABLET ORAL 2 TIMES DAILY PRN
Qty: 30 TABLET | Refills: 0 | Status: CANCELLED | OUTPATIENT
Start: 2022-11-10

## 2022-11-10 RX ORDER — LEVONORGESTREL / ETHINYL ESTRADIOL AND ETHINYL ESTRADIOL 150-30(84)
1 KIT ORAL DAILY
Qty: 91 TABLET | Refills: 1 | Status: CANCELLED | OUTPATIENT
Start: 2022-11-10

## 2022-11-10 NOTE — TELEPHONE ENCOUNTER
Pt here today for BP check on ancillary schedule. She is requesting refills of her ain medication as well as her birth control. Informed pt she just received refill of her pain medication on 10/24/22. She still is requesting refill request be sent to provider.  Refills pended along with pt preferred pharmacy.    oxyCODONE-acetaminophen (PERCOCET) 7.5-325 MG per tablet 30 tablet 0 10/24/2022  No   Sig - Route: Take 1 tablet by mouth 2 times daily as needed for pain - Oral   Sent to pharmacy as: oxyCODONE-Acetaminophen 7.5-325 MG Oral Tablet (PERCOCET)     Coreen Freire CMA

## 2022-11-10 NOTE — PROGRESS NOTES
I met with Jigna Amador at the request of Dr. Esquivel to recheck her blood pressure.  Blood pressure medications on the med list were reviewed with patient.    Patient has taken all medications as per usual regimen: pt did not take her BP med today prior to coming into the clinic.  Patient reports tolerating them without any issues or concerns: Yes    Vitals:    11/10/22 1005 11/10/22 1035   BP: (!) 162/92 (!) 143/90   Pulse: 82 85   Weight: 123.4 kg (272 lb)        After 5 minutes, the patient's blood pressure remained greater than or equal to 140/90.    Is the patient currently having any chest pain? No  Does the patient currently have a headache? No  Does the patient currently have any vision changes? No  Does the patient currently have any nausea? No  Does the patient currently have any abdominal pain? No    The previous encounter was reviewed.  The patient was discharged and the note will be sent to the provider for final review.      Pt requested refills while in the clinic today. Pt told provider was not in clinic today and I would send refills to provider for review. Pt had no other questions or concerns at this time. Refill request sent to provider per pt request.

## 2022-11-10 NOTE — TELEPHONE ENCOUNTER
Please call patient: This would be patient's 3rd refill of percocet in the past 30 days. That can wait for PCP to review upon return Monday. She also already got refill of birth control a month ago.    WADE Mosqueda, NP-C  [facility]

## 2022-11-13 ENCOUNTER — TELEPHONE (OUTPATIENT)
Dept: NURSING | Facility: CLINIC | Age: 32
End: 2022-11-13

## 2022-11-13 DIAGNOSIS — N64.4 BREAST PAIN: ICD-10-CM

## 2022-11-13 DIAGNOSIS — N94.6 DYSMENORRHEA: ICD-10-CM

## 2022-11-13 NOTE — TELEPHONE ENCOUNTER
Pt calling with medication refill request;      Pt states that on or about 10/28/22 she forgot all of her birth control in a drawer at a hotel she had re-located too temporarily and hotel threw them away.      RN reviewed msg of 11/10/22 from NP, Kerry Sanabria with Pt - who states she has not had 3 refills of percocet in past 30 days.    Will route this msg to PCP/Care Team to advise Pt at    [phone]      A detailed msg can be left on .    Hattie Marr RN, Nurse Advisor 5:04 PM 11/13/2022

## 2022-11-14 RX ORDER — OXYCODONE AND ACETAMINOPHEN 7.5; 325 MG/1; MG/1
1 TABLET ORAL 2 TIMES DAILY PRN
Qty: 30 TABLET | Refills: 0 | Status: SHIPPED | OUTPATIENT
Start: 2022-11-14 | End: 2022-12-20

## 2022-11-14 RX ORDER — LEVONORGESTREL / ETHINYL ESTRADIOL AND ETHINYL ESTRADIOL 150-30(84)
1 KIT ORAL DAILY
Qty: 91 TABLET | Refills: 1 | Status: SHIPPED | OUTPATIENT
Start: 2022-11-14 | End: 2022-11-14

## 2022-11-14 RX ORDER — LEVONORGESTREL / ETHINYL ESTRADIOL AND ETHINYL ESTRADIOL 150-30(84)
1 KIT ORAL DAILY
Qty: 91 TABLET | Refills: 1 | Status: SHIPPED | OUTPATIENT
Start: 2022-11-14 | End: 2023-06-14

## 2022-11-14 NOTE — TELEPHONE ENCOUNTER
I will send in those refills, but please let her know there will be no more refills until she returns her controlled substance agreement that we mailed to her.  If she does not have a copy we can print a new one out that she can sign when she comes in

## 2022-12-12 DIAGNOSIS — N64.4 BREAST PAIN: ICD-10-CM

## 2022-12-12 DIAGNOSIS — N94.6 DYSMENORRHEA: ICD-10-CM

## 2022-12-12 RX ORDER — OXYCODONE AND ACETAMINOPHEN 7.5; 325 MG/1; MG/1
1 TABLET ORAL 2 TIMES DAILY PRN
Qty: 30 TABLET | Refills: 0 | Status: CANCELLED | OUTPATIENT
Start: 2022-12-12

## 2022-12-12 NOTE — TELEPHONE ENCOUNTER
Medication Question or Refill    Contacts       Type Contact Phone/Fax    12/12/2022 12:55 PM CST Phone (Incoming) Jigna Amador (Self) [phone] (M)          What medication are you calling about (include dose and sig)?: oxyCODONE-acetaminophen (PERCOCET) 7.5-325 MG per tablet    Controlled Substance Agreement on file:   CSA -- Patient Level:     [Media Unavailable] Controlled Substance Agreement - Opioid - Scan on 6/25/2021  2:59 PM   [Media Unavailable] Controlled Substance Agreement - Opioid - Scan on 3/3/2020  7:08 AM: Opioid       Who prescribed the medication?: Dr. Esquivel    Do you need a refill? Yes:    When did you use the medication last? approx. 12/5/22    Patient offered an appointment? Yes: pt declined. Pt has upcoming appt on 3/23/23.    Do you have any questions or concerns?  No    Preferred Pharmacy:   WRITTEN PRESCRIPTION REQUESTED  No address on file      [id number] [address] [place] [address]  Phone: [phone] Fax: [phone]    [id number] [address] [address]  Phone: [phone] Fax: [phone]    [facility] [address] [place] [facility] [address]  [address]  [facility] [address]  Phone: [phone] Fax: [phone]    [facility] [address] [place] [facility] - [place], [place] - [address] [address]  [address] [place] [address]  Phone: [phone] Fax: [phone]    [id number] [id number] [facility] [address]  Phone: [phone] Fax: [phone]      Could we send this information to you in [facility] or would you prefer to receive a phone call?:   Patient would prefer a phone call   Okay to leave a detailed message?: Yes at Cell number on file:    Telephone Information:   Mobile [phone]

## 2022-12-12 NOTE — TELEPHONE ENCOUNTER
Refill denied - patient has yet to return her CSA.  No refill without that.we have sent her a couple.  If need be we can print and patient can stop by to sign.

## 2022-12-13 NOTE — TELEPHONE ENCOUNTER
RN called and LVM to call clinic at [phone].     If patient calls back please relay provider message below.    Elisa MULLEN, RN

## 2022-12-13 NOTE — TELEPHONE ENCOUNTER
Patient returned call to clinic. Writer relayed provider message regarding denial of refill request, need to complete CSA and return to office.  Patient voiced good understanding, does still have this at home, will complete and turn in to office ASAP.      Krista Castillo RN  [facility]

## 2022-12-19 NOTE — TELEPHONE ENCOUNTER
Patient was calling back to check on refill. She said that she faxed over the controlled substance agreement on Thursday 12/15/22. Was this received at [facility]?Dasha Berrios

## 2022-12-20 RX ORDER — OXYCODONE AND ACETAMINOPHEN 7.5; 325 MG/1; MG/1
1 TABLET ORAL 2 TIMES DAILY PRN
Qty: 30 TABLET | Refills: 0 | Status: SHIPPED | OUTPATIENT
Start: 2022-12-20 | End: 2023-01-19

## 2022-12-20 NOTE — TELEPHONE ENCOUNTER
Pt called again and I infomred her we did not have the agreement yet in clinic. So pt faxed over the signed agreemnt to us again and I did receive it in clinic today 12/20/22. Pt asking if refill can be sent now today since we have the agreement on file.     Placed CSA on Barbara Esquivel desLingvist for review and signature.     Routing to PCP to review.  Coreen Freire, CMA

## 2023-01-09 ENCOUNTER — MYC REFILL (OUTPATIENT)
Dept: FAMILY MEDICINE | Facility: CLINIC | Age: 33
End: 2023-01-09

## 2023-01-09 DIAGNOSIS — N94.6 DYSMENORRHEA: ICD-10-CM

## 2023-01-09 DIAGNOSIS — N64.4 BREAST PAIN: ICD-10-CM

## 2023-01-09 RX ORDER — OXYCODONE AND ACETAMINOPHEN 7.5; 325 MG/1; MG/1
1 TABLET ORAL 2 TIMES DAILY PRN
Qty: 30 TABLET | Refills: 0 | OUTPATIENT
Start: 2023-01-09

## 2023-01-09 NOTE — TELEPHONE ENCOUNTER
Refill denied.  Too soon.  The 30 tablets is supposed to last her a month and she just filled this on 12/20.  Meant for rare intermittent usage.

## 2023-01-10 DIAGNOSIS — N64.4 BREAST PAIN: ICD-10-CM

## 2023-01-10 DIAGNOSIS — I10 HYPERTENSION GOAL BP (BLOOD PRESSURE) < 140/90: ICD-10-CM

## 2023-01-10 DIAGNOSIS — N94.6 DYSMENORRHEA: ICD-10-CM

## 2023-01-10 RX ORDER — HYDROCHLOROTHIAZIDE 50 MG/1
50 TABLET ORAL DAILY
Qty: 90 TABLET | Refills: 0 | Status: SHIPPED | OUTPATIENT
Start: 2023-01-10 | End: 2023-04-17

## 2023-01-10 RX ORDER — OXYCODONE AND ACETAMINOPHEN 7.5; 325 MG/1; MG/1
1 TABLET ORAL 2 TIMES DAILY PRN
Qty: 30 TABLET | Refills: 0 | OUTPATIENT
Start: 2023-01-10

## 2023-01-10 NOTE — TELEPHONE ENCOUNTER
Medication Question or Refill    Contacts       Type Contact Phone/Fax    01/10/2023 02:32 PM CST Phone (Incoming) PerryJigna MONI (Self) [phone] (M)          What medication are you calling about (include dose and sig)?: Oxycodone and hydrchlorothiazide    Controlled Substance Agreement on file:   CSA -- Patient Level:     [Media Unavailable] Controlled Substance Agreement - Opioid - Scan on 12/21/2022  2:23 PM: Opioid   [Media Unavailable] Controlled Substance Agreement - Opioid - Scan on 6/25/2021  2:59 PM   [Media Unavailable] Controlled Substance Agreement - Opioid - Scan on 3/3/2020  7:08 AM: Opioid       Who prescribed the medication?: Dr Esquivel    Do you need a refill? Yes:     When did you use the medication last?     Patient offered an appointment? No    Do you have any questions or concerns?  No    Preferred Pharmacy:    [facility]/pharmacy #[id number] [place] [id number] [address]  Phone: [phone] Fax: [phone]      Could we send this information to you in [employer] or would you prefer to receive a phone call?:   Patient would prefer a phone call   Okay to leave a detailed message?: Yes at Cell number on file:    Telephone Information:   Mobile [phone]     Dasha Fleming MA/PR[facility]PER

## 2023-01-13 ENCOUNTER — TELEPHONE (OUTPATIENT)
Dept: FAMILY MEDICINE | Facility: CLINIC | Age: 33
End: 2023-01-13

## 2023-01-13 NOTE — TELEPHONE ENCOUNTER
Pt called back and wanted to let Dr. Esquivel know that the 30 tablets of the oxyCODONE-acetaminophen (PERCOCET) 7.5-325 MG per tablet are not lasting her the month. She has been having a lot of pain from her back and breast. She wanted me to send this to Dr. Esquivel.  Coreen Freire, CMA

## 2023-01-16 NOTE — TELEPHONE ENCOUNTER
RN called patient and LVM to call clinic at [phone].    If patient calls back please relay provider message below and assist patient in scheduling an appointment with PCP to discuss treatment options and medication management.    Fiona Mei RN    [facility]

## 2023-01-16 NOTE — TELEPHONE ENCOUNTER
I do not feel comfortable increasing the quantity.  That was an increase from before.  This is not the kind of medication that can be taken on a regular basis.  The risk of addiction is extremely high.  So if she is having significant pain then those issues need to be addressed but not just covered up by pain medication.

## 2023-01-16 NOTE — TELEPHONE ENCOUNTER
Patient is calling back in regards to voicemail message left below. Patient was relayed provider message and verbalized understanding.     Writer assisted patient in scheduling a virtual video visit for tomorrow 1/17/23 with PCP.    No further questions or concerns at this time.    Nila Hankins RN, BSN  [facility]

## 2023-01-17 ENCOUNTER — TELEPHONE (OUTPATIENT)
Dept: FAMILY MEDICINE | Facility: CLINIC | Age: 33
End: 2023-01-17

## 2023-01-17 DIAGNOSIS — N64.4 BREAST PAIN: ICD-10-CM

## 2023-01-17 DIAGNOSIS — N94.6 DYSMENORRHEA: ICD-10-CM

## 2023-01-17 RX ORDER — OXYCODONE AND ACETAMINOPHEN 7.5; 325 MG/1; MG/1
1 TABLET ORAL 2 TIMES DAILY PRN
Qty: 30 TABLET | Refills: 0 | OUTPATIENT
Start: 2023-01-17

## 2023-01-17 NOTE — TELEPHONE ENCOUNTER
Routing refill request to provider for review/approval because:  Drug not on the [facility] refill protocol. RN notes patient has appointment with PCP 1/19/2023.    Fiona Mei RN    [facility]

## 2023-01-17 NOTE — TELEPHONE ENCOUNTER
Reason for Call:  Medication or medication refill:    Do you use a [facility]?  Name of the pharmacy and phone number for the current request: [facility]pharmacy #[id number] [facility] [address] [facility]        Name of the medication requested: oxyCODONE-acetaminophen (PERCOCET) 7.5-325 MG per tablet    Can we leave a detailed message on this number? YES    Phone number patient can be reached at: Home number on file [phone] (home)    Best Time: anytime    Call taken on 1/17/2023 at 12:27 PM by Megan Cunha

## 2023-01-19 ENCOUNTER — VIRTUAL VISIT (OUTPATIENT)
Dept: FAMILY MEDICINE | Facility: CLINIC | Age: 33
End: 2023-01-19
Payer: COMMERCIAL

## 2023-01-19 DIAGNOSIS — N64.4 BREAST PAIN: ICD-10-CM

## 2023-01-19 DIAGNOSIS — I10 HYPERTENSION GOAL BP (BLOOD PRESSURE) < 140/90: Primary | ICD-10-CM

## 2023-01-19 DIAGNOSIS — F11.90 CHRONIC, CONTINUOUS USE OF OPIOIDS: ICD-10-CM

## 2023-01-19 DIAGNOSIS — E66.01 MORBID OBESITY WITH BMI OF 50.0-59.9, ADULT (H): ICD-10-CM

## 2023-01-19 DIAGNOSIS — N94.6 DYSMENORRHEA: ICD-10-CM

## 2023-01-19 PROCEDURE — 99214 OFFICE O/P EST MOD 30 MIN: CPT | Mod: 95 | Performed by: FAMILY MEDICINE

## 2023-01-19 RX ORDER — CYCLOBENZAPRINE HCL 10 MG
10 TABLET ORAL 3 TIMES DAILY PRN
Qty: 40 TABLET | Refills: 2 | Status: SHIPPED | OUTPATIENT
Start: 2023-01-19 | End: 2023-10-17

## 2023-01-19 RX ORDER — OXYCODONE AND ACETAMINOPHEN 7.5; 325 MG/1; MG/1
1 TABLET ORAL 2 TIMES DAILY PRN
Qty: 30 TABLET | Refills: 0 | Status: SHIPPED | OUTPATIENT
Start: 2023-01-19 | End: 2023-02-16

## 2023-01-19 ASSESSMENT — ANXIETY QUESTIONNAIRES
GAD7 TOTAL SCORE: 12
1. FEELING NERVOUS, ANXIOUS, OR ON EDGE: SEVERAL DAYS
IF YOU CHECKED OFF ANY PROBLEMS ON THIS QUESTIONNAIRE, HOW DIFFICULT HAVE THESE PROBLEMS MADE IT FOR YOU TO DO YOUR WORK, TAKE CARE OF THINGS AT HOME, OR GET ALONG WITH OTHER PEOPLE: VERY DIFFICULT
3. WORRYING TOO MUCH ABOUT DIFFERENT THINGS: NEARLY EVERY DAY
GAD7 TOTAL SCORE: 12
4. TROUBLE RELAXING: MORE THAN HALF THE DAYS
7. FEELING AFRAID AS IF SOMETHING AWFUL MIGHT HAPPEN: NOT AT ALL
6. BECOMING EASILY ANNOYED OR IRRITABLE: SEVERAL DAYS
5. BEING SO RESTLESS THAT IT IS HARD TO SIT STILL: MORE THAN HALF THE DAYS
2. NOT BEING ABLE TO STOP OR CONTROL WORRYING: NEARLY EVERY DAY

## 2023-01-19 NOTE — PROGRESS NOTES
Jigna is a 32 year old who is being evaluated via a billable video visit.      How would you like to obtain your AVS? MyChart  If the video visit is dropped, the invitation should be resent by: Text to cell phone: [phone]  Will anyone else be joining your video visit? No          Assessment & Plan     Hypertension goal BP (blood pressure) < 140/90  Slightly above goal last time we checked.  Weight loss is paramount to getting this under control and we again discussed dietary and lifestyle measures.  We will continue treatment and monitoring.    Morbid obesity with BMI of 50.0-59.9, adult (H)  Talked about working hard on weight loss efforts.    Chronic, continuous use of opioids  Patient had asked about increasing quantity but I really do not want to increase reliance on pain medication.  We will be adding some muscle relaxers to help deal with menstrual cramps.  As far as the breast pain she plans on working on smoking cessation so she can have a breast reduction in the upcoming future.  And I think that is a better way to go as far as pain control.  Work on the sources.    Dysmenorrhea  As above  - oxyCODONE-acetaminophen (PERCOCET) 7.5-325 MG per tablet; Take 1 tablet by mouth 2 times daily as needed for pain  - cyclobenzaprine (FLEXERIL) 10 MG tablet; Take 1 tablet (10 mg) by mouth 3 times daily as needed for muscle spasms    Breast pain  As above  - oxyCODONE-acetaminophen (PERCOCET) 7.5-325 MG per tablet; Take 1 tablet by mouth 2 times daily as needed for pain         See Patient Instructions    Return in about 3 months (around 4/19/2023) for Follow up on Pain, In Office or Video, can call for refills.    Barbara Esquivel MD  [facility]   Jigna is a 32 year old, presenting for the following health issues:  Recheck Medication      HPI     Visit with patient today in follow-up of breast pain, menstrual cramps, hypertension.    Review of Systems   Constitutional,  "HEENT, cardiovascular, pulmonary, gi and gu systems are negative, except as otherwise noted.      Objective    Vitals - Patient Reported  Weight (Patient Reported): 121.1 kg (267 lb)  Height (Patient Reported): 167.6 cm (5' 6\")  BMI (Based on Pt Reported Ht/Wt): 43.09  Pain Score: Worst Pain (10)  Pain Loc: Mid Back        Physical Exam   GENERAL: Healthy, alert and no distress  EYES: Eyes grossly normal to inspection.  No discharge or erythema, or obvious scleral/conjunctival abnormalities.  RESP: No audible wheeze, cough, or visible cyanosis.  No visible retractions or increased work of breathing.    SKIN: Visible skin clear. No significant rash, abnormal pigmentation or lesions.  NEURO: Cranial nerves grossly intact.  Mentation and speech appropriate for age.  PSYCH: Mentation appears normal, affect normal/bright, judgement and insight intact, normal speech and appearance well-groomed.    Past labs reviewed with the patient.             Video-Visit Details    Type of service:  Video Visit   Video Start Time: 0712  Video End Time:0717    Originating Location (pt. Location): Home    Distant Location (provider location):  Off-site  Platform used for Video Visit: Isidra    "

## 2023-02-16 DIAGNOSIS — N94.6 DYSMENORRHEA: ICD-10-CM

## 2023-02-16 DIAGNOSIS — N64.4 BREAST PAIN: ICD-10-CM

## 2023-02-16 RX ORDER — OXYCODONE AND ACETAMINOPHEN 7.5; 325 MG/1; MG/1
1 TABLET ORAL 2 TIMES DAILY PRN
Qty: 30 TABLET | Refills: 0 | Status: SHIPPED | OUTPATIENT
Start: 2023-02-16 | End: 2023-03-20

## 2023-02-16 NOTE — TELEPHONE ENCOUNTER
Reason for Call:  Medication or medication refill:    Do you use a [facility]?  Name of the pharmacy and phone number for the current request: [facility] [place], [address]     Name of the medication requested: oxyCODONE-acetaminophen (PERCOCET) 7.5-325 MG per tablet    Other request:     Can we leave a detailed message on this number? YES    Phone number patient can be reached at: Cell number on file:    Telephone Information:   Mobile [phone]     Best Time:    Call taken on 2/16/2023 at 12:56 PM by Mila Roach

## 2023-02-16 NOTE — TELEPHONE ENCOUNTER
Medication and pharmacy queued.    Routing refill request to provider for review/approval because:  Drug not on the [facility] refill protocol     Elisa Cervantes RN  [facility]

## 2023-03-02 NOTE — TELEPHONE ENCOUNTER
Chlorthalidone 25 mg  Duplicate request. Medication was refilled on 11/11/20 by PCP.    Krista Castillo RN  [facility]       Intermediate Repair Preamble Text (Leave Blank If You Do Not Want): Undermining was performed with blunt dissection.

## 2023-03-20 DIAGNOSIS — N94.6 DYSMENORRHEA: ICD-10-CM

## 2023-03-20 DIAGNOSIS — N64.4 BREAST PAIN: ICD-10-CM

## 2023-03-20 RX ORDER — OXYCODONE AND ACETAMINOPHEN 7.5; 325 MG/1; MG/1
1 TABLET ORAL 2 TIMES DAILY PRN
Qty: 30 TABLET | Refills: 0 | Status: SHIPPED | OUTPATIENT
Start: 2023-03-20 | End: 2023-04-17

## 2023-03-20 NOTE — TELEPHONE ENCOUNTER
Pharmacy [facility] to  [id number] [place], [place] - [address] AT [facility]

## 2023-03-20 NOTE — TELEPHONE ENCOUNTER
Medication Question or Refill    Contacts       Type Contact Phone/Fax    03/20/2023 11:39 AM CDT Phone (Incoming) Jigna Amador (Self) [phone] (M)     also can call [phone]          What medication are you calling about (include dose and sig)?: Oxycodone 7.5mg    Preferred Pharmacy:   WRITTEN PRESCRIPTION REQUESTED  No address on file      [id number] [address] [address]  Phone: [phone] Fax: [phone]    [facility] [place], [place] - [address] [facility]  [address] [facility] [address]  Phone: [phone] Fax: [phone]    [facility] [address] [place] [facility] [address]  [address]  [facility] [address]  Phone: [phone] Fax: [phone]    [facility] [address] [place] [facility] [address]  [address]  [facility] [address]  Phone: [phone] Fax: [phone]    [id number] [place], [place] - [address] [address]  Phone: [phone] Fax: [phone]    [id number] [address] [facility]  [address] [place] [address]  Phone: [phone] Fax: [phone]      Controlled Substance Agreement on file:   CSA -- Patient Level:     [Media Unavailable] Controlled Substance Agreement - Opioid - Scan on 12/21/2022  2:23 PM: Opioid   [Media Unavailable] Controlled Substance Agreement - Opioid - Scan on 6/25/2021  2:59 PM   [Media Unavailable] Controlled Substance Agreement - Opioid - Scan on 3/3/2020  7:08 AM: Opioid       Who prescribed the medication?: PCP [facility]    Do you need a refill? Yes    When did you use the medication last? 3/18/23    Patient offered an appointment? No    Do you have any questions or concerns?  Yes: Pt would like the medication to be sent to a pharmacy in [place] because she will be visiting her ill mother.      Could we send this information to you in [employer] or would you  prefer to receive a phone call?:   Patient would prefer a phone call   Okay to leave a detailed message?: Yes at Cell number on file:    Telephone Information:   Mobile [phone]

## 2023-04-17 DIAGNOSIS — N94.6 DYSMENORRHEA: ICD-10-CM

## 2023-04-17 DIAGNOSIS — I10 HYPERTENSION GOAL BP (BLOOD PRESSURE) < 140/90: ICD-10-CM

## 2023-04-17 DIAGNOSIS — N64.4 BREAST PAIN: ICD-10-CM

## 2023-04-17 RX ORDER — HYDROCHLOROTHIAZIDE 50 MG/1
50 TABLET ORAL DAILY
Qty: 90 TABLET | Refills: 0 | Status: SHIPPED | OUTPATIENT
Start: 2023-04-17 | End: 2023-06-14

## 2023-04-17 RX ORDER — OXYCODONE AND ACETAMINOPHEN 7.5; 325 MG/1; MG/1
1 TABLET ORAL 2 TIMES DAILY PRN
Qty: 30 TABLET | Refills: 0 | Status: SHIPPED | OUTPATIENT
Start: 2023-04-17 | End: 2023-04-24

## 2023-04-17 NOTE — TELEPHONE ENCOUNTER
Called patient and advise needs a follow up visit for further refills on Percocet 7.5-325 mg.     Patient reports she is currently out of state visiting mom and will return to [place] next week. Writer advise patient unable to have virtual visit with PCP. Patient agreed to schedule for 4/28/23 when she will be back in [place]. Advise patient of check in time of 10:40 am for appointment.     Patient states she is out of medications and is needing refill, patient wants to know if they will be sent today. Advise patient writer will send request to provider to review.     Routing to provider to review and advise.     TK Echols  [facility]

## 2023-04-17 NOTE — TELEPHONE ENCOUNTER
Reason for Call:  Medication or medication refill:    Do you use a [facility]?  Name of the pharmacy and phone number for the current request:      Name of the medication requested:           oxyCODONE-acetaminophen (PERCOCET) 7.5-325 MG per tablet          hydrochlorothiazide (HYDRODIURIL) 50 MG tablet    Other request:     Can we leave a detailed message on this number? YES    Phone number patient can be reached at: Cell number on file:    Telephone Information:   Mobile [phone]       Call taken on 4/17/2023 at 11:01 AM by Mila Roach

## 2023-04-24 ENCOUNTER — TELEPHONE (OUTPATIENT)
Dept: FAMILY MEDICINE | Facility: CLINIC | Age: 33
End: 2023-04-24
Payer: COMMERCIAL

## 2023-04-24 DIAGNOSIS — N64.4 BREAST PAIN: ICD-10-CM

## 2023-04-24 DIAGNOSIS — N94.6 DYSMENORRHEA: ICD-10-CM

## 2023-04-24 RX ORDER — OXYCODONE AND ACETAMINOPHEN 7.5; 325 MG/1; MG/1
1 TABLET ORAL 2 TIMES DAILY PRN
Qty: 30 TABLET | Refills: 0 | Status: SHIPPED | OUTPATIENT
Start: 2023-04-24 | End: 2023-04-25

## 2023-04-24 NOTE — TELEPHONE ENCOUNTER
Reason for Call:  Medication or medication refill:    Do you use a [facility]?  Name of the pharmacy and phone number for the current request:  Would like sent to [address] [address] phone number [phone]    Name of the medication requested:           oxyCODONE-acetaminophen (PERCOCET) 7.5-325 MG per tablet              Other request: Pt called stating medication med was  Sent to they don't have enough supply so would like it sent to requested pharmacy.     Can we leave a detailed message on this number? YES    Phone number patient can be reached at: Cell number on file:    Telephone Information:   Mobile [phone]       Call taken on 4/24/2023 at 11:04 AM by Mila Roach

## 2023-04-25 RX ORDER — OXYCODONE AND ACETAMINOPHEN 7.5; 325 MG/1; MG/1
1 TABLET ORAL 2 TIMES DAILY PRN
Qty: 30 TABLET | Refills: 0 | Status: SHIPPED | OUTPATIENT
Start: 2023-04-25 | End: 2023-06-14

## 2023-04-25 NOTE — TELEPHONE ENCOUNTER
I do not think I feel comfortable in doing this.  This would be the third prescription for a controlled substance that we have sent out of state and I cannot verify those prescriptions through the  database.  So we could call the first 2 pharmacies and ask whether she tried to have them filled and whether they had availability, etc.  But that seems like a lot of work.  I just do not feel comfortable continuing to prescribe prescriptions out-of-state.

## 2023-04-25 NOTE — TELEPHONE ENCOUNTER
Okay, since we verified that nothing was picked up at other pharmacies I will send this in to this pharmacy 1 more time.  If not then I suggest we just do it here.

## 2023-04-25 NOTE — TELEPHONE ENCOUNTER
Patient called.  [facility] at [address] [facility] [place] did not have Percocet available. Please resend RX   oxyCODONE-acetaminophen (PERCOCET) 7.5-325 MG per tablet    [facility]   [address] [place] [address]  [phone]

## 2023-05-25 ENCOUNTER — TELEPHONE (OUTPATIENT)
Dept: FAMILY MEDICINE | Facility: CLINIC | Age: 33
End: 2023-05-25
Payer: COMMERCIAL

## 2023-05-25 NOTE — TELEPHONE ENCOUNTER
As always, if I have an appropriate spot to use, go ahead and book it.  But my schedule is always pretty full, and not likely to change anytime soon.  So patient's are going to need to be flexible and consider virtual visits, or even an e-visit if it is something appropriate for that.

## 2023-05-25 NOTE — TELEPHONE ENCOUNTER
Reason for Call:  Appointment Request    Patient requesting this type of appt: Chronic Diease Management/Medication/Follow-Up    Requested provider: Barbara Esquivel    Reason patient unable to be scheduled: Not within requested timeframe    When does patient want to be seen/preferred time: 1-2 days    Comments: Patient needs new scripts and to discuss other medications    Could we send this information to you in [facility] or would you prefer to receive a phone call?:   Patient would prefer a phone call   Okay to leave a detailed message?: Yes at Home number on file [phone]( Mobile)    Call taken on 5/25/2023 at 3:28 PM by Manuel Andrade

## 2023-05-25 NOTE — TELEPHONE ENCOUNTER
Called and lvm for pt to callback and schedule appt.     Felice MONTENEGRO [facility]    Primary Care

## 2023-06-08 ENCOUNTER — TELEPHONE (OUTPATIENT)
Dept: FAMILY MEDICINE | Facility: CLINIC | Age: 33
End: 2023-06-08
Payer: COMMERCIAL

## 2023-06-08 DIAGNOSIS — N64.4 BREAST PAIN: ICD-10-CM

## 2023-06-08 DIAGNOSIS — N94.6 DYSMENORRHEA: ICD-10-CM

## 2023-06-08 RX ORDER — OXYCODONE AND ACETAMINOPHEN 7.5; 325 MG/1; MG/1
1 TABLET ORAL 2 TIMES DAILY PRN
Qty: 30 TABLET | Refills: 0 | OUTPATIENT
Start: 2023-06-08

## 2023-06-08 RX ORDER — LEVONORGESTREL / ETHINYL ESTRADIOL AND ETHINYL ESTRADIOL 150-30(84)
1 KIT ORAL DAILY
Qty: 91 TABLET | Refills: 1 | OUTPATIENT
Start: 2023-06-08

## 2023-06-08 NOTE — TELEPHONE ENCOUNTER
Not seen since January.  Has appointment with Dr. Boucher next week   No show several appointments   Last filled 2/16/23 30 tablets  [place] fills outside of this office.  Overdue for pap and office visit.  UDS not up to date   CSA 12/21/22  Refills declined - needs to keep upcoming appointment as scheduled

## 2023-06-08 NOTE — TELEPHONE ENCOUNTER
Reason for Call:  Medication or medication refill:    [facility]/pharmacy #[id number] - [facility], [place] - [address] [facility].        Name of the medication requested: oxyCODONE-acetaminophen (PERCOCET) 7.5-325 MG per tablet    levonorgest-eth estrad 91-Day (SEASONIQUE) 0.15-0.03 &0.01 MG tablet    Can we leave a detailed message on this number? YES    Phone number patient can be reached at: Home number on file [phone] (home)    Best Time: anytime    Call taken on 6/8/2023 at 9:46 AM by Megan Cunha

## 2023-06-14 ENCOUNTER — OFFICE VISIT (OUTPATIENT)
Dept: FAMILY MEDICINE | Facility: CLINIC | Age: 33
End: 2023-06-14
Payer: COMMERCIAL

## 2023-06-14 VITALS
RESPIRATION RATE: 16 BRPM | DIASTOLIC BLOOD PRESSURE: 95 MMHG | BODY MASS INDEX: 47.01 KG/M2 | TEMPERATURE: 98.8 F | HEIGHT: 66 IN | SYSTOLIC BLOOD PRESSURE: 134 MMHG | OXYGEN SATURATION: 100 % | HEART RATE: 78 BPM | WEIGHT: 292.5 LBS

## 2023-06-14 DIAGNOSIS — A04.8 H. PYLORI INFECTION: Primary | ICD-10-CM

## 2023-06-14 DIAGNOSIS — I82.4Y2 ACUTE DEEP VEIN THROMBOSIS (DVT) OF PROXIMAL VEIN OF LEFT LOWER EXTREMITY (H): ICD-10-CM

## 2023-06-14 DIAGNOSIS — N64.4 BREAST PAIN: ICD-10-CM

## 2023-06-14 DIAGNOSIS — Z30.9 ENCOUNTER FOR CONTRACEPTIVE MANAGEMENT, UNSPECIFIED TYPE: ICD-10-CM

## 2023-06-14 DIAGNOSIS — I10 HYPERTENSION GOAL BP (BLOOD PRESSURE) < 140/90: ICD-10-CM

## 2023-06-14 DIAGNOSIS — L20.9 ATOPIC DERMATITIS, UNSPECIFIED TYPE: ICD-10-CM

## 2023-06-14 DIAGNOSIS — F11.90 CHRONIC, CONTINUOUS USE OF OPIOIDS: ICD-10-CM

## 2023-06-14 DIAGNOSIS — K92.2 UPPER GI BLEED: ICD-10-CM

## 2023-06-14 DIAGNOSIS — N94.6 DYSMENORRHEA: ICD-10-CM

## 2023-06-14 PROCEDURE — 99215 OFFICE O/P EST HI 40 MIN: CPT | Performed by: INTERNAL MEDICINE

## 2023-06-14 RX ORDER — AMOXICILLIN 500 MG/1
1000 CAPSULE ORAL 2 TIMES DAILY
Qty: 56 CAPSULE | Refills: 0 | Status: SHIPPED | OUTPATIENT
Start: 2023-06-14 | End: 2023-06-28

## 2023-06-14 RX ORDER — OXYCODONE AND ACETAMINOPHEN 7.5; 325 MG/1; MG/1
1 TABLET ORAL 2 TIMES DAILY PRN
Qty: 30 TABLET | Refills: 0 | Status: SHIPPED | OUTPATIENT
Start: 2023-06-14 | End: 2023-08-24

## 2023-06-14 RX ORDER — CLARITHROMYCIN 500 MG
500 TABLET ORAL 2 TIMES DAILY
Qty: 28 TABLET | Refills: 0 | Status: SHIPPED | OUTPATIENT
Start: 2023-06-14 | End: 2023-06-28

## 2023-06-14 RX ORDER — HYDROCHLOROTHIAZIDE 50 MG/1
50 TABLET ORAL DAILY
Qty: 90 TABLET | Refills: 0 | Status: SHIPPED | OUTPATIENT
Start: 2023-06-14 | End: 2023-09-22

## 2023-06-14 RX ORDER — PANTOPRAZOLE SODIUM 40 MG/1
40 TABLET, DELAYED RELEASE ORAL 2 TIMES DAILY
Qty: 28 TABLET | Refills: 0 | Status: SHIPPED | OUTPATIENT
Start: 2023-06-14 | End: 2023-09-22

## 2023-06-14 RX ORDER — TRIAMCINOLONE ACETONIDE 1 MG/G
CREAM TOPICAL 2 TIMES DAILY
Qty: 45 G | Refills: 0 | Status: SHIPPED | OUTPATIENT
Start: 2023-06-14

## 2023-06-14 ASSESSMENT — ANXIETY QUESTIONNAIRES
IF YOU CHECKED OFF ANY PROBLEMS ON THIS QUESTIONNAIRE, HOW DIFFICULT HAVE THESE PROBLEMS MADE IT FOR YOU TO DO YOUR WORK, TAKE CARE OF THINGS AT HOME, OR GET ALONG WITH OTHER PEOPLE: NOT DIFFICULT AT ALL
5. BEING SO RESTLESS THAT IT IS HARD TO SIT STILL: SEVERAL DAYS
GAD7 TOTAL SCORE: 12
7. FEELING AFRAID AS IF SOMETHING AWFUL MIGHT HAPPEN: NEARLY EVERY DAY
3. WORRYING TOO MUCH ABOUT DIFFERENT THINGS: NEARLY EVERY DAY
GAD7 TOTAL SCORE: 14
2. NOT BEING ABLE TO STOP OR CONTROL WORRYING: NEARLY EVERY DAY
2. NOT BEING ABLE TO STOP OR CONTROL WORRYING: NEARLY EVERY DAY
6. BECOMING EASILY ANNOYED OR IRRITABLE: SEVERAL DAYS
8. IF YOU CHECKED OFF ANY PROBLEMS, HOW DIFFICULT HAVE THESE MADE IT FOR YOU TO DO YOUR WORK, TAKE CARE OF THINGS AT HOME, OR GET ALONG WITH OTHER PEOPLE?: NOT DIFFICULT AT ALL
7. FEELING AFRAID AS IF SOMETHING AWFUL MIGHT HAPPEN: NEARLY EVERY DAY
7. FEELING AFRAID AS IF SOMETHING AWFUL MIGHT HAPPEN: NEARLY EVERY DAY
1. FEELING NERVOUS, ANXIOUS, OR ON EDGE: SEVERAL DAYS
4. TROUBLE RELAXING: SEVERAL DAYS
4. TROUBLE RELAXING: SEVERAL DAYS
GAD7 TOTAL SCORE: 14
6. BECOMING EASILY ANNOYED OR IRRITABLE: SEVERAL DAYS
5. BEING SO RESTLESS THAT IT IS HARD TO SIT STILL: NOT AT ALL
IF YOU CHECKED OFF ANY PROBLEMS ON THIS QUESTIONNAIRE, HOW DIFFICULT HAVE THESE PROBLEMS MADE IT FOR YOU TO DO YOUR WORK, TAKE CARE OF THINGS AT HOME, OR GET ALONG WITH OTHER PEOPLE: EXTREMELY DIFFICULT
3. WORRYING TOO MUCH ABOUT DIFFERENT THINGS: NEARLY EVERY DAY
1. FEELING NERVOUS, ANXIOUS, OR ON EDGE: MORE THAN HALF THE DAYS

## 2023-06-14 NOTE — PROGRESS NOTES
"  Hospital Follow-up Visit:    Hospital/Nursing Home/IP Rehab Facility: [facility]    Date of Admission: 05/04/2023  Date of Discharge: 05/8/2023  Reason(s) for Admission: fatigue and abdominal pain     Was your hospitalization related to COVID-19? No   Problems taking medications regularly:  None  Medication changes since discharge: odansetron,eliquis   Problems adhering to non-medication therapy:  None    Summary of hospitalization:  {HOSPITAL DISCHARGE SUMMARY INFO:956985::\"[facility] discharge summary reviewed\"}  Diagnostic Tests/Treatments reviewed.  Follow up needed: {NONE DEFAULTED:964142::\"none\"}  Other Healthcare Providers Involved in Patient s Care:         {those currently involved after discharge:867146::\"None\"}  Update since discharge: {IMPROVED DEFAULT:711312::\"improved.\"} {TIP  Include information from family/caregivers, SNF, Care Coordination :097769}        Plan of care communicated with {Communicate Plan to:693744::\"patient\"}     {Reference  Coding guidelines- Moderate Complexity F2F/Video within 7 - 14 days of discharge 3525265, High Complexity F2F/Video within 7 days 1330284 or nzshre16 days 7885485 :721490}      {additonal problems for provider to add (Optional):900350}  Answers for HPI/ROS submitted by the patient on 6/14/2023  JASON 7 TOTAL SCORE: 14      "

## 2023-07-18 ENCOUNTER — TELEPHONE (OUTPATIENT)
Dept: FAMILY MEDICINE | Facility: CLINIC | Age: 33
End: 2023-07-18
Payer: COMMERCIAL

## 2023-07-18 DIAGNOSIS — N64.4 BREAST PAIN: ICD-10-CM

## 2023-07-18 DIAGNOSIS — N94.6 DYSMENORRHEA: ICD-10-CM

## 2023-07-18 RX ORDER — OXYCODONE AND ACETAMINOPHEN 7.5; 325 MG/1; MG/1
1 TABLET ORAL 2 TIMES DAILY PRN
Qty: 30 TABLET | Refills: 0 | OUTPATIENT
Start: 2023-07-18

## 2023-07-18 NOTE — TELEPHONE ENCOUNTER
Refill denied.  She is overdue for urine drug screen.  Dr. Boucher ordered that last month.  So she needs to have this done before any further refill.  And it sounds like there was confusion over her DVT.  She could even schedule a virtual visit at some point with me to discuss.  But she needs to have a urine drug screen done.

## 2023-07-18 NOTE — TELEPHONE ENCOUNTER
Patient is calling for oxycodone refill to be send to the pharmacy attached.    Cassidy Jones RN  [facility]

## 2023-07-23 ENCOUNTER — HEALTH MAINTENANCE LETTER (OUTPATIENT)
Age: 33
End: 2023-07-23

## 2023-08-24 ENCOUNTER — LAB (OUTPATIENT)
Dept: LAB | Facility: CLINIC | Age: 33
End: 2023-08-24
Payer: COMMERCIAL

## 2023-08-24 ENCOUNTER — TELEPHONE (OUTPATIENT)
Dept: FAMILY MEDICINE | Facility: CLINIC | Age: 33
End: 2023-08-24

## 2023-08-24 DIAGNOSIS — I10 HYPERTENSION GOAL BP (BLOOD PRESSURE) < 140/90: ICD-10-CM

## 2023-08-24 DIAGNOSIS — I82.4Y2 ACUTE DEEP VEIN THROMBOSIS (DVT) OF PROXIMAL VEIN OF LEFT LOWER EXTREMITY (H): ICD-10-CM

## 2023-08-24 DIAGNOSIS — N94.6 DYSMENORRHEA: ICD-10-CM

## 2023-08-24 DIAGNOSIS — N64.4 BREAST PAIN: ICD-10-CM

## 2023-08-24 LAB
ANION GAP SERPL CALCULATED.3IONS-SCNC: 12 MMOL/L (ref 7–15)
BUN SERPL-MCNC: 13.9 MG/DL (ref 6–20)
CALCIUM SERPL-MCNC: 9.7 MG/DL (ref 8.6–10)
CANNABINOIDS UR QL SCN: ABNORMAL
CHLORIDE SERPL-SCNC: 102 MMOL/L (ref 98–107)
CREAT SERPL-MCNC: 0.73 MG/DL (ref 0.51–0.95)
CREAT UR-MCNC: 156 MG/DL
DEPRECATED HCO3 PLAS-SCNC: 23 MMOL/L (ref 22–29)
GFR SERPL CREATININE-BSD FRML MDRD: >90 ML/MIN/1.73M2
GLUCOSE SERPL-MCNC: 102 MG/DL (ref 70–99)
POTASSIUM SERPL-SCNC: 4 MMOL/L (ref 3.4–5.3)
SODIUM SERPL-SCNC: 137 MMOL/L (ref 136–145)

## 2023-08-24 PROCEDURE — 80307 DRUG TEST PRSMV CHEM ANLYZR: CPT

## 2023-08-24 PROCEDURE — 36415 COLL VENOUS BLD VENIPUNCTURE: CPT

## 2023-08-24 PROCEDURE — 80048 BASIC METABOLIC PNL TOTAL CA: CPT

## 2023-08-24 PROCEDURE — G0480 DRUG TEST DEF 1-7 CLASSES: HCPCS

## 2023-08-24 RX ORDER — OXYCODONE AND ACETAMINOPHEN 7.5; 325 MG/1; MG/1
1 TABLET ORAL 2 TIMES DAILY PRN
Qty: 30 TABLET | Refills: 0 | Status: SHIPPED | OUTPATIENT
Start: 2023-08-24 | End: 2023-09-22

## 2023-08-24 NOTE — TELEPHONE ENCOUNTER
New Medication Request    Contacts         Type Contact Phone/Fax    08/24/2023 09:13 AM CDT In Person (Incoming) Jigna Amador (Self)             What medication are you requesting?: Oxycodone  Would like RX sent to [place] [id number] [facility] [place] [address]    Reason for medication request: Higher dosage without the Tylenol in it    Have you taken this medication before?: Yes:     Controlled Substance Agreement on file:   CSA -- Patient Level:     [Media Unavailable] Controlled Substance Agreement - Opioid - Scan on 12/21/2022  2:23 PM: Opioid   [Media Unavailable] Controlled Substance Agreement - Opioid - Scan on 6/25/2021  2:59 PM   [Media Unavailable] Controlled Substance Agreement - Opioid - Scan on 3/3/2020  7:08 AM: Opioid         Patient offered an appointment? No    Preferred Pharmacy:   WRITTEN PRESCRIPTION REQUESTED  No address on file      [id number] [place] - [address] [address]  Phone: [phone] Fax: [phone]    [id number] [address] [address]  Phone: [phone] Fax: [phone]    [facility] [address] [facility] - [facility] [address]  [address]  [facility] [address]  Phone: [phone] Fax: [phone]    [facility] [address] [facility] [employer] - [place] - [address]  [address] [place] [address]  Phone: [phone] Fax: [phone]    [id number] [place], [place] - [id number] [facility] [address]  Phone: [phone] Fax: [phone]    [id number] [place] - [address]  [address] [facility]  [facility] [address]  Phone: [phone] Fax: [phone]    [id number] [address] AT [facility]  [address] [facility] [address]  Phone: [phone] Fax: [phone]    [id number] [place] - [address]   [address] [facility]  [address]  [facility] [address]  Phone: [phone] Fax: [phone]    [id number] [place] - [address] AT [place] [id number] [facility]  [facility] [address]  Phone: [phone] Fax: [phone]    [id number] [place] - [address] AT [facility]  [address] [address]  Phone: [phone] Fax: [phone]    [id number] [address] [facility]  [address] [place] [address]  Phone: [phone] Fax: [phone]    [id number] [address] [place] [place] AT [facility] OF [address] [place], [facility] [address]  Phone: [phone] Fax: [phone]      Could we send this information to you in [place] or would you prefer to receive a phone call?:   Patient would prefer a phone call   Okay to leave a detailed message?: Yes at Cell number on file:    Telephone Information:   Mobile [phone]

## 2023-08-28 LAB — GABAPENTIN UR QL CFM: PRESENT

## 2023-09-22 ENCOUNTER — OFFICE VISIT (OUTPATIENT)
Dept: FAMILY MEDICINE | Facility: CLINIC | Age: 33
End: 2023-09-22
Payer: COMMERCIAL

## 2023-09-22 VITALS
TEMPERATURE: 98.5 F | BODY MASS INDEX: 47.09 KG/M2 | HEART RATE: 77 BPM | WEIGHT: 293 LBS | SYSTOLIC BLOOD PRESSURE: 135 MMHG | OXYGEN SATURATION: 100 % | DIASTOLIC BLOOD PRESSURE: 83 MMHG | RESPIRATION RATE: 15 BRPM | HEIGHT: 66 IN

## 2023-09-22 DIAGNOSIS — F11.90 CHRONIC, CONTINUOUS USE OF OPIOIDS: ICD-10-CM

## 2023-09-22 DIAGNOSIS — N94.6 DYSMENORRHEA: ICD-10-CM

## 2023-09-22 DIAGNOSIS — N64.4 BREAST PAIN: ICD-10-CM

## 2023-09-22 DIAGNOSIS — A04.8 H. PYLORI INFECTION: ICD-10-CM

## 2023-09-22 DIAGNOSIS — I10 HYPERTENSION GOAL BP (BLOOD PRESSURE) < 140/90: ICD-10-CM

## 2023-09-22 DIAGNOSIS — F43.21 GRIEF REACTION: Primary | ICD-10-CM

## 2023-09-22 PROCEDURE — 99214 OFFICE O/P EST MOD 30 MIN: CPT | Performed by: FAMILY MEDICINE

## 2023-09-22 RX ORDER — OXYCODONE HYDROCHLORIDE 10 MG/1
10 TABLET ORAL 2 TIMES DAILY PRN
Qty: 40 TABLET | Refills: 0 | Status: SHIPPED | OUTPATIENT
Start: 2023-09-22 | End: 2023-10-17

## 2023-09-22 RX ORDER — HYDROCHLOROTHIAZIDE 50 MG/1
50 TABLET ORAL DAILY
Qty: 90 TABLET | Refills: 1 | Status: SHIPPED | OUTPATIENT
Start: 2023-09-22 | End: 2023-12-11

## 2023-09-22 RX ORDER — OXYCODONE AND ACETAMINOPHEN 7.5; 325 MG/1; MG/1
1 TABLET ORAL 2 TIMES DAILY PRN
Qty: 30 TABLET | Refills: 0 | Status: SHIPPED | OUTPATIENT
Start: 2023-09-22 | End: 2023-09-22

## 2023-09-22 RX ORDER — PANTOPRAZOLE SODIUM 40 MG/1
40 TABLET, DELAYED RELEASE ORAL 2 TIMES DAILY
Qty: 28 TABLET | Refills: 1 | Status: SHIPPED | OUTPATIENT
Start: 2023-09-22 | End: 2024-03-28

## 2023-09-22 ASSESSMENT — ANXIETY QUESTIONNAIRES
GAD7 TOTAL SCORE: 21
IF YOU CHECKED OFF ANY PROBLEMS ON THIS QUESTIONNAIRE, HOW DIFFICULT HAVE THESE PROBLEMS MADE IT FOR YOU TO DO YOUR WORK, TAKE CARE OF THINGS AT HOME, OR GET ALONG WITH OTHER PEOPLE: EXTREMELY DIFFICULT
2. NOT BEING ABLE TO STOP OR CONTROL WORRYING: NEARLY EVERY DAY
4. TROUBLE RELAXING: NEARLY EVERY DAY
5. BEING SO RESTLESS THAT IT IS HARD TO SIT STILL: NEARLY EVERY DAY
1. FEELING NERVOUS, ANXIOUS, OR ON EDGE: NEARLY EVERY DAY
7. FEELING AFRAID AS IF SOMETHING AWFUL MIGHT HAPPEN: NEARLY EVERY DAY
GAD7 TOTAL SCORE: 21
3. WORRYING TOO MUCH ABOUT DIFFERENT THINGS: NEARLY EVERY DAY
6. BECOMING EASILY ANNOYED OR IRRITABLE: NEARLY EVERY DAY

## 2023-09-22 ASSESSMENT — PAIN SCALES - GENERAL: PAINLEVEL: WORST PAIN (10)

## 2023-09-22 NOTE — COMMUNITY RESOURCES LIST (ENGLISH)
09/22/2023   [facility]  N/A  For questions about this resource list or additional care needs, please contact your primary care clinic or care manager.  Phone: [phone]   Email: N/A   Address: [address] [place] [address]   Hours: N/A        Food and Nutrition       Food pantry  1  [facility] Distance: 0.19 miles      In-Person   [address] [place] [address]  Language: English  Hours: Mon - Thu 1:00 PM - 4:30 PM , Fri 1:30 PM - 3:00 PM  Fees: Free   Phone: (475) 749-7679 Email: Reilly@[employer]. Website: https://www.BankFacil/     2  [facility] Distance: 0.21 miles      [facility]   [address] [place] [address]  Language: English  Hours: Tue 5:00 PM - 6:30 PM  Fees: Free   Phone: (501) 374-1468 Email: office@[facility].[employer] Website: http://www.Personal Factory.[employer]/     SNAP application assistance  3  [facility] - Office of Multicultural Services Distance: 0.45 miles      Phone/Virtual   [address] [place] [address]  Language: American Sign Language, Bengali, Czech, English, Kazakh, Lithuanian, Oromo, Montserratian, Indian, Singaporean, Swahili, Albanian, Kyrgyz  Hours: Mon - Tue 9:00 AM - 4:00 PM , Wed 10:00 AM - 5:00 PM , Thu - Fri 9:00 AM - 4:00 PM  Fees: Free   Phone: (620) 541-3532 Email: oms@[employer]. Website: http://www.[facility]/residents/human-services/multi-cultural-services     4  [employer]) [facility] Distance: 0.81 miles      In-Person   [address] [place] [address]  Language: English, Singaporean  Hours: Mon - Fri 8:30 AM - 5:00 PM  Fees: Free   Phone: (259) 523-2205 Email: info@clues.org Website: http://www.clues.org/     Soup kitchen or free meals  5  [facility] [place] [facility] [place] [place] Distance: 0.29 miles      In-Person   [address] [place] [address]  Language: English  Hours: Mon - Wed 12:00 PM - 1:00 PM  Fees: Free    Phone: (436) 512-5289 Email: kadi@[facility] HospitalTechniScan.org Website: https://centralusa.[facility].org/northern/Cassidy/     6  [facility] - [place]aves [employer] [place]ishes Distance: 0.94 miles      Pickup   [address] [place] [address]  Language: English, Swiss  Hours: Mon - Thu 5:15 PM - 6:15 PM  Fees: Free   Phone: (864) 713-4341 Ext.214 Email: nhqcgilsvy4415@misterbnb Website: http://www.The Tot[facility] GroupSouthern Maine Health CareLumeta.org/          Hotlines and Helplines       Hotline - Housing crisis  7  [facility] Distance: 1.27 miles      Phone/Virtual   [address] [place] [address]  Language: English  Hours: Mon - Sun Open 24 Hours   Phone: (983) 200-5302 Email: communications@[facility].org Website: https://[facility].org/oursaviourshousing/     8  [facility] Distance: 2.31 miles      Phone/Virtual   [address] [place] [address]  Language: English  Hours: Mon - Sun Open 24 Hours  Fees: Free   Phone: (779) 297-7666 Email: info@saintolaf.org Website: http://www.saintolaf.org/          Housing       Coordinated Entry access point  9  [facility] - Housing Services Distance: 1.29 miles      In-Person   [address] [place] [address]  Language: English  Hours: Mon - Fri 9:00 AM - 5:00 PM  Fees: Free   Phone: (705) 663-4306 Email: housing@[facility].org Website: http://www.[facility].org/housing     10  [facility] - Adult [facility] Distance: 2.31 miles      In-Person   [address] [place] [address]  Language: English  Hours: Mon - Sat 10:00 PM - 5:00 PM  Fees: Free   Phone: (705) 427-8293 Email: info@saintolaf.org Website: http://www.saintolaf.org/     Drop-in center or day shelter  11  [place] [facility] Distance: 1.66 miles      In-Person   [address] [place] [address]  Language: English, Grenadian, Swiss  Hours: Mon - Sat 7:00 AM - 3:00 PM   Fees: Free, Self Pay   Phone: (679) 635-2527 Email: info@[employer].LikeList Website: https://www.[employer].org/locations/opportunity-center/     12  [facility] Distance: 1.67 miles      In-Person   [address] [place] [address]  Language: English  Hours: Mon - Fri 12:00 PM - 3:00 PM  Fees: Free   Phone: (880) 251-8905 Email: Livio Radio@[employer] Generation Website: http://Livio Radio.LikeList/     Housing search assistance  13  [facility] - Office of Multicultural Services Distance: 0.45 miles      Phone/Virtual   [address] [place] [address]  Language: American Sign Language, Slovak, French, English, Belarusian, Czech, Oromo, Puerto Rican, Turks and Caicos Islander, Dutch, Swahili, Slovenian, Bulgarian  Hours: Mon - Tue 9:00 AM - 4:00 PM , Wed 10:00 AM - 5:00 PM , Thu - Fri 9:00 AM - 4:00 PM  Fees: Free   Phone: (388) 934-9959 Email: oms@[facility] Website: http://www.[facility]/residents/human-services/multi-cultural-services     14  [facility] ([facility]) Distance: 1.27 miles      In-Person   [address] [place] [address]  Language: English, Turks and Caicos Islander, Dutch  Hours: Mon - Fri 8:30 AM - 4:30 PM  Fees: Free   Phone: (319) 209-5793 Email: sylvester@[facility].org Website: http://www.[facility].org/     Shelter for families  15  [facility] Distance: 19.24 miles      In-Person   [address] [place] [address]  Language: English  Hours: Mon - Fri 3:00 PM - 9:00 AM , Sat - Sun Open 24 Hours  Fees: Free   Phone: (372) 750-5065 Ext.1 Website: https://www.saintandWAY Systems.org/2020/07/03/emergency-family-shelter/     Shelter for individuals  16  [facility] Distance: 1.27 miles      In-Person   [address] [place] [address]  Language: English  Hours: Mon - Sun Open 24 Hours  Fees: Free   Phone: (803) 325-8840 Email: communications@[facility]-[place].org Website: https://[facility]-mn.org/oursaviourshousing/     17   [facility] Distance: 2.78 miles      In-Person   [address] [place] [address]  Language: English  Hours: Mon - Fri 4:00 PM - 9:00 AM  Fees: Free   Phone: (448) 829-8544 Email: gabriel@[facility].[employer] Website: https://[facility].org/[facility] Hospitaler/          Important Numbers & Websites       Emergency Services   911  [facility] Services   311  Poison Control   (240) 862-5112  Suicide Prevention Lifeline   (401) 482-9024 (TALK)  Child Abuse Hotline   (303) 581-7662 (4-A-Child)  Sexual Assault Hotline   (795) 935-3540 (HOPE)  National Runaway Safeline   (586) 192-3756 (RUNAWAY)  All-Options Talkline   (400) 729-6547  Substance Abuse Referral   (606) 801-2799 (HELP)

## 2023-09-22 NOTE — PROGRESS NOTES
Assessment & Plan     Grief reaction  Patient unfortunately lost both her mother and her aunt earlier this year.  Says she does not have any siblings and not a lot of family in the area.  So she feels very alone.  We talked about options I would like to get her plugged in with grief counseling if I could and we will start her on Zoloft.  Discussed mechanism of action of the proposed medication, as well as potential effects, both good and bad.  Patient expressed understanding and agreed with treatment.  Contact me in a couple of weeks with progress.  - sertraline (ZOLOFT) 50 MG tablet; Take 1 tablet (50 mg) by mouth daily  - Adult Mental Health  Referral; Future    Hypertension goal BP (blood pressure) < 140/90  Stable on current regimen.  Continue same plan and routine follow-up.   - hydrochlorothiazide (HYDRODIURIL) 50 MG tablet; Take 1 tablet (50 mg) by mouth daily    Dysmenorrhea  We will use straight oxycodone in place of the previous Percocet.  Up-to-date on routine monitoring.  - oxyCODONE IR (ROXICODONE) 10 MG tablet; Take 1 tablet (10 mg) by mouth 2 times daily as needed for pain    Breast pain  As above   - oxyCODONE IR (ROXICODONE) 10 MG tablet; Take 1 tablet (10 mg) by mouth 2 times daily as needed for pain    Chronic, continuous use of opioids  Up-to-date on routine monitoring    H. pylori infection  Stable on current regimen.  Continue same plan and routine follow-up.   - pantoprazole (PROTONIX) 40 MG EC tablet; Take 1 tablet (40 mg) by mouth 2 times daily       Nicotine/Tobacco Cessation:  She reports that she has been smoking cigarettes. She has a 5.00 pack-year smoking history. She has been exposed to tobacco smoke. She has never used smokeless tobacco.  Nicotine/Tobacco Cessation Plan:   Self help information given to patient      See Patient Instructions    Barbara Esquivel MD  [facility]    Barbara Benito is a 33 year old, presenting for the following  health issues:  No chief complaint on file.        9/22/2023    12:53 PM   Additional Questions   Roomed by Deisy MORENO   Accompanied by self       HPI       Medication Followup of Oxycodone   Taking Medication as prescribed: yes  Side Effects:  None  Medication Helping Symptoms:  yes    Here today in follow-up of ongoing pain issues as well as blood pressure.  1 to talk a lot about depression related to the loss of her mother and aunt.      Review of Systems   Constitutional, HEENT, cardiovascular, pulmonary, gi and gu systems are negative, except as otherwise noted.      Objective    There were no vitals taken for this visit.  There is no height or weight on file to calculate BMI.  Physical Exam   Alert, pleasant, upbeat, and in no apparent discomfort.  S1 and S2 normal, no murmurs, clicks, gallops or rubs. Regular rate and rhythm. Chest is clear; no wheezes or rales. No edema or JVD.  Past labs reviewed with the patient.                     Answers submitted by the patient for this visit:  JASON-7 (Submitted on 9/22/2023)  JASON 7 TOTAL SCORE: 21

## 2023-09-22 NOTE — PATIENT INSTRUCTIONS
Plan:    1) start sertraline once daily.  I would not expect any side effects but I also expect that you may not notice things for the first few days or so.  Gradually over the course of a week or so things may seem a little bit better.  But this is just a starting dose so let me know in a couple of weeks how things are going and then we can make adjustments or changes or what ever.    2) I will have the schedulers contact you about perhaps talking to a counselor about how to deal with grief and loneliness.    3) keep taking all the other medications as prescribed

## 2023-09-22 NOTE — PROGRESS NOTES
Answers submitted by the patient for this visit:  JASON-7 (Submitted on 9/22/2023)  JASON 7 TOTAL SCORE: 21  Hypertension Visit (Submitted on 9/22/2023)  Chief Complaint: Chronic problems general questions HPI Form  Do you check your blood pressure regularly outside of the clinic?: No  Are your blood pressures ever more than 140 on the top number (systolic) OR more than 90 on the bottom number (diastolic)? (For example, greater than 140/90): Yes  Are you following a low salt diet?: Yes  Migraine Visit Questionnaire (Submitted on 9/22/2023)  Chief Complaint: Chronic problems general questions HPI Form  Headache Symptoms are: worsened  How often are you getting headaches or migraines? : almost all day  Are you able to do normal daily activities when you have a migraine?: No  Migraine Rescue/Relief Medications:: no rescue/relief medications  Effectiveness of rescue/relief medications:: I get no relief  Migraine Preventative Medications:: no medications to prevent migraines  ER or UC Visits:: 0 times  Back Pain Visit Questionnaire (Submitted on 9/22/2023)  Your back pain is: chronic  Chronic or Recurring Back Pain Visit Questionnaire (Submitted on 9/22/2023)  Where is your back pain located? : right middle of back  How would you describe your back pain? : stabbing  Where does your back pain spread? : left foot  Since you noticed your back pain, how has it changed? : gradually worsening  Does your back pain interfere with your job?: Yes  General Questionnaire (Submitted on 9/22/2023)  Chief Complaint: Chronic problems general questions HPI Form  How many servings of fruits and vegetables do you eat daily?: 0-1  On average, how many sweetened beverages do you drink each day (Examples: soda, juice, sweet tea, etc.  Do NOT count diet or artificially sweetened beverages)?: 6  How many minutes a day do you exercise enough to make your heart beat faster?: 9 or less  How many days a week do you exercise enough to make your heart  beat faster?: 3 or less  How many days per week do you miss taking your medication?: 0

## 2023-10-16 DIAGNOSIS — N94.6 DYSMENORRHEA: ICD-10-CM

## 2023-10-16 DIAGNOSIS — Z86.718 PERSONAL HISTORY OF DVT (DEEP VEIN THROMBOSIS): Primary | ICD-10-CM

## 2023-10-16 DIAGNOSIS — N64.4 BREAST PAIN: ICD-10-CM

## 2023-10-16 NOTE — TELEPHONE ENCOUNTER
Medication Question or Refill  Patient is asking for RX for Muscle Relaxer and also back on birth control.       What medication are you calling about (include dose and sig)?: oxyCODONE IR (ROXICODONE) 10 MG tablet     Preferred Pharmacy:  [id number] - [place] - [address]  [facility] [address]  Phone: [phone] Fax: [phone]      Controlled Substance Agreement on file:   CSA -- Patient Level:     [Media Unavailable] Controlled Substance Agreement - Opioid - Scan on 12/21/2022  2:23 PM: Opioid   [Media Unavailable] Controlled Substance Agreement - Opioid - Scan on 6/25/2021  2:59 PM   [Media Unavailable] Controlled Substance Agreement - Opioid - Scan on 3/3/2020  7:08 AM: Opioid       Who prescribed the medication?: Dr Esquivel    Do you need a refill? Yes      Could we send this information to you in [facility] or would you prefer to receive a phone call?:   Patient would prefer a phone call   Okay to leave a detailed message?: Yes at Cell number on file:    Telephone Information:   Mobile [phone]

## 2023-10-17 ENCOUNTER — TELEPHONE (OUTPATIENT)
Dept: FAMILY MEDICINE | Facility: CLINIC | Age: 33
End: 2023-10-17
Payer: COMMERCIAL

## 2023-10-17 PROBLEM — Z86.718 PERSONAL HISTORY OF DVT (DEEP VEIN THROMBOSIS): Status: ACTIVE | Noted: 2023-10-17

## 2023-10-17 RX ORDER — CYCLOBENZAPRINE HCL 10 MG
10 TABLET ORAL 3 TIMES DAILY PRN
Qty: 40 TABLET | Refills: 2 | Status: SHIPPED | OUTPATIENT
Start: 2023-10-17

## 2023-10-17 RX ORDER — LEVONORGESTREL / ETHINYL ESTRADIOL AND ETHINYL ESTRADIOL 150-30(84)
1 KIT ORAL DAILY
Qty: 91 TABLET | Refills: 1 | Status: CANCELLED | OUTPATIENT
Start: 2023-10-17

## 2023-10-17 RX ORDER — OXYCODONE HYDROCHLORIDE 10 MG/1
10 TABLET ORAL 2 TIMES DAILY PRN
Qty: 40 TABLET | Refills: 0 | Status: SHIPPED | OUTPATIENT
Start: 2023-10-17 | End: 2023-11-16

## 2023-10-17 NOTE — TELEPHONE ENCOUNTER
I will send in refills for the pain medication and the muscle relaxer.  Next pain follow-up due in December.    But she cannot be on that type of birth control pill any longer.  It is contraindicated given her history of blood clots as well as other risk factors.  So there are variety of different contraception things to be considered depending on her particular goals.  If she would like we can get her in with GYN to talk about options

## 2023-10-17 NOTE — TELEPHONE ENCOUNTER
Patient calling back, states she was talking to a nurse and phone got disconnected. Patient wanted to make sure medications request and message sent to provider. Advise nurse did route refill request and message below to provider and waiting for provider to respond. Advise can update once provider has review and advise. Patient verbalized understanding.     TK Echols  [facility]

## 2023-10-17 NOTE — TELEPHONE ENCOUNTER
Called patient about these refill requests. Patient was unsure of name of muscle relaxer, but states Dr. Esquivel prescribed for her in the past. Flexeril on current medication list.     Writer also asked if previous birth control prescription was working for her, pt stated yes and that she wanted to get her menstrual cycle. Pt having connection issues with her phone then abruptly hung up the phone.    Last visit with Dr. Esquivel 9/22/23  Reviewed chart and Dr. Boucher's message below from 6/14/23 when birth control medication was discontinued.         Routing to Dr. Esquivel to advise on birth control prescription.    Eve Chang RN    [facility]- [facility]

## 2023-10-18 NOTE — TELEPHONE ENCOUNTER
Pt notified of provider message as written.  Pt verbalized good understanding.  She will discuss birth control with Dr. Esquivel at Dec appointment.   Pt transferred to  to make appointment.  Suyapa LANDISN, RN

## 2023-11-16 ENCOUNTER — TELEPHONE (OUTPATIENT)
Dept: FAMILY MEDICINE | Facility: CLINIC | Age: 33
End: 2023-11-16
Payer: COMMERCIAL

## 2023-11-16 DIAGNOSIS — Z86.718 PERSONAL HISTORY OF DVT (DEEP VEIN THROMBOSIS): Primary | ICD-10-CM

## 2023-11-16 DIAGNOSIS — Z30.09 ENCOUNTER FOR OTHER GENERAL COUNSELING OR ADVICE ON CONTRACEPTION: ICD-10-CM

## 2023-11-16 DIAGNOSIS — N64.4 BREAST PAIN: ICD-10-CM

## 2023-11-16 DIAGNOSIS — N94.6 DYSMENORRHEA: ICD-10-CM

## 2023-11-16 RX ORDER — OXYCODONE HYDROCHLORIDE 10 MG/1
10 TABLET ORAL 2 TIMES DAILY PRN
Qty: 40 TABLET | Refills: 0 | Status: SHIPPED | OUTPATIENT
Start: 2023-11-16 | End: 2023-12-11

## 2023-11-16 NOTE — TELEPHONE ENCOUNTER
Reason for Call:  Medication or medication refill:    Do you use a [facility]?  [facility] [place] [place]     Name of the medication requested:     oxyCODONE IR (ROXICODONE) 10 MG tablet   hydrochlorothiazide (HYDRODIURIL) 50 MG tablet     Other request: Pt states she needs her blood pressure meds has not taken in like 5 days feet are swollen. Pt would like birth control refilled but is .     Can we leave a detailed message on this number? YES    Phone number patient can be reached at: Cell number on file:    Telephone Information:   Mobile [phone]       Best Time:     Call taken on 2023 at 10:28 AM by Mila Roach

## 2023-11-16 NOTE — TELEPHONE ENCOUNTER
Has upcoming appointment with PCP  Last visit with Dr. Esquivel 9/22/23  CSA up to date - expires 12/21/23  UDS 8/24/23   Should have refill available for hydrochlorothiazide - verify with pharmac  [place] and no fills outside of this office.  Last fileld oxycodone 10/17/23   I don't see any oral contraceptive pill on med list- will need to review with PCP

## 2023-11-16 NOTE — TELEPHONE ENCOUNTER
RN called patient and LVM to call clinic at [phone].      If patient calls back, please relay provider message below.     Kerry Reagan RN

## 2023-11-17 NOTE — TELEPHONE ENCOUNTER
RN called patient to relay provider message below. Patient verbalized understanding and will contact pharmacy to check on status of her refills.    RN reviewed PCP message regarding birth control med from telephone encounter 10/17/2023 below.         Patient is interested in having referral to Gyn/Ob to discuss birth control options.    TK Pérez  [facility] [place] [facility] Triage

## 2023-12-11 ENCOUNTER — OFFICE VISIT (OUTPATIENT)
Dept: FAMILY MEDICINE | Facility: CLINIC | Age: 33
End: 2023-12-11
Payer: COMMERCIAL

## 2023-12-11 VITALS
SYSTOLIC BLOOD PRESSURE: 132 MMHG | WEIGHT: 293 LBS | OXYGEN SATURATION: 98 % | TEMPERATURE: 98.6 F | DIASTOLIC BLOOD PRESSURE: 82 MMHG | HEIGHT: 66 IN | RESPIRATION RATE: 15 BRPM | HEART RATE: 76 BPM | BODY MASS INDEX: 47.09 KG/M2

## 2023-12-11 DIAGNOSIS — N94.6 DYSMENORRHEA: ICD-10-CM

## 2023-12-11 DIAGNOSIS — E66.01 MORBID OBESITY WITH BMI OF 50.0-59.9, ADULT (H): ICD-10-CM

## 2023-12-11 DIAGNOSIS — I10 HYPERTENSION GOAL BP (BLOOD PRESSURE) < 140/90: Primary | ICD-10-CM

## 2023-12-11 DIAGNOSIS — F11.90 CHRONIC, CONTINUOUS USE OF OPIOIDS: ICD-10-CM

## 2023-12-11 DIAGNOSIS — N64.4 BREAST PAIN: ICD-10-CM

## 2023-12-11 DIAGNOSIS — Z86.718 PERSONAL HISTORY OF DVT (DEEP VEIN THROMBOSIS): ICD-10-CM

## 2023-12-11 PROCEDURE — 99214 OFFICE O/P EST MOD 30 MIN: CPT | Performed by: FAMILY MEDICINE

## 2023-12-11 RX ORDER — OXYCODONE HYDROCHLORIDE 10 MG/1
10 TABLET ORAL 2 TIMES DAILY PRN
Qty: 40 TABLET | Refills: 0 | Status: SHIPPED | OUTPATIENT
Start: 2023-12-11 | End: 2024-01-05

## 2023-12-11 RX ORDER — HYDROCHLOROTHIAZIDE 50 MG/1
50 TABLET ORAL DAILY
Qty: 90 TABLET | Refills: 1 | Status: SHIPPED | OUTPATIENT
Start: 2023-12-11 | End: 2024-06-03

## 2023-12-11 ASSESSMENT — PAIN SCALES - GENERAL: PAINLEVEL: WORST PAIN (10)

## 2023-12-11 ASSESSMENT — PATIENT HEALTH QUESTIONNAIRE - PHQ9
SUM OF ALL RESPONSES TO PHQ QUESTIONS 1-9: 19
SUM OF ALL RESPONSES TO PHQ QUESTIONS 1-9: 19
10. IF YOU CHECKED OFF ANY PROBLEMS, HOW DIFFICULT HAVE THESE PROBLEMS MADE IT FOR YOU TO DO YOUR WORK, TAKE CARE OF THINGS AT HOME, OR GET ALONG WITH OTHER PEOPLE: SOMEWHAT DIFFICULT

## 2023-12-11 NOTE — COMMUNITY RESOURCES LIST (ENGLISH)
12/11/2023   [facility]  N/A  For questions about this resource list or additional care needs, please contact your primary care clinic or care manager.  Phone: [phone]   Email: N/A   Address: [address] [place] [id number]   Hours: N/A        Financial Stability       Utility payment assistance  1  [facility] - Utility payment assistance Distance: 3.7 miles      In-Person, Phone/Virtual   [address] [place] [address]  Language: English  Hours: Mon - Thu 9:00 AM - 3:00 PM  Fees: Free   Phone: (833) 255-8190 Email: [email or web address].org Website: http://www.[facility].org/care-ministries/     2  [facility] Distance: 3.72 miles      In-Person   [address] [place] [address]  Language: English  Hours: Mon - Fri 8:00 AM - 3:00 PM  Fees: Free   Phone: (993) 543-8836 Ext.14 Email: [facility].[employer] Website: http://www.[facility].org          Food and Nutrition       Food pantry  3  [facility] [place] [facility] Distance: 0.89 miles      In-Person, Pickup   [address], [place] [address]  Language: English  Hours: Tue 10:30 AM - 3:00 PM  Fees: Free   Phone: (278) 320-8631 Email: office@[facility].[employer].it Website: http://www.[facility].Stamp.it/foodministries.html     4  [facility] Distance: 1.82 miles      Pickup   [address] [place] [address]  Language: English  Hours: Sat 10:00 AM - 2:00 PM  Fees: Free   Phone: (834) 155-8837 Email: [email or web address]@[email or web address] Website: http://[facility].org     SNAP application assistance  5  [facility] - Office of Multicultural Services Distance: 3.4 miles      Phone/Virtual   [address] [place] [address]  Language: American Sign Language, Khmer, Romansh, English, Equatorial Guinean, Chadian, Oromo, Syrian, Russian, Yoruba, Swahili, Lithuanian,  Maltese  Hours: Mon - Tue 9:00 AM - 4:00 PM , Wed 10:00 AM - 5:00 PM , Thu - Fri 9:00 AM - 4:00 PM  Fees: Free   Phone: (138) 398-2312 Email: oms@[facility] Website: http://www.[facility]/residents/human-services/multi-cultural-services     6  [employer] Distance: 3.8 miles      In-Person   [address] [place] [address]  Language: English, English  Hours: Mon - Fri 8:30 AM - 5:00 PM  Fees: Free   Phone: (810) 469-7214 Email: info@[employer].org Website: http://www.[employer].org/     Soup kitchen or free meals  7  [facility] - [employer] Distance: 1.81 miles      Pickup   [address] [place] [address]  Language: English  Hours: Wed 5:00 PM - 6:00 PM  Fees: Free   Phone: (738) 638-8235 Email: office@[employer] Website: https://[employer].org/     8  [facility] - Sunday Suppers Distance: 2.13 miles      In-Person   [address] [place] [address]  Language: English  Hours: Sun 5:00 PM - 6:30 PM  Fees: Free   Phone: (506) 763-5285 Email: dllc.office@[employer].org Website: http://www.[employer].org/          Transportation       Free or low-cost transportation  9  [facility] Distance: 3.72 miles      In-Person   [address] [place] [address]  Language: English  Hours: Mon - Fri 8:00 AM - 3:00 PM  Fees: Free   Phone: (547) 780-5536 Ext.14 Email: neighborhood@[facility].[employer] Website: http://www.[facility].org     10  [employer] [facility] Distance: 3.93 miles      In-Person   [address] [place] [address]  Language: English  Hours: Mon - Fri 9:00 AM - 12:00 PM , Mon - Fri 1:00 PM - 3:00 PM  Fees: Self Pay   Phone: (582) 610-9654 Email: info@[employer] Website: https://www.[facility].org/[place]     Transportation to medical appointments  11  [facility] [employer] Distance: 3.58 miles      In-Person   [address] [facility] [place] [address]  Language: English, Moldovan  Hours: Mon - Sun Appt. Only  Fees: Self Pay   Phone: (505) 861-8846 Email: sydni@[employer] Website: http://www.[employer]/     12  [facility] Transportation Programs - Non-Emergency Medical Transportation Distance: 3.95 miles      In-Person   [address] [place] [address]  Language: English, Kenyan, Moldovan  Hours: Mon - Fri 7:00 AM - 6:00 PM  Fees: Insurance   Phone: (153) 640-4157 Ext.4476 Email: darrian@[employer] Website: http://www.[facility].net/[place]/          Important Numbers & Websites       Emergency Services   911  City Services   311  Poison Control   (930) 512-2281  Suicide Prevention Lifeline   (471) 785-5468 (TALK)  Child Abuse Hotline   (759) 394-2864 (4-A-Child)  Sexual Assault Hotline   (782) 713-3091 (HOPE)  National Runaway Safeline   (930) 270-8728 (RUNAWAY)  All-Options Talkline   (278) 813-1937  Substance Abuse Referral   (939) 858-8837 (HELP)

## 2023-12-11 NOTE — LETTER
Opioid / Opioid Plus Controlled Substance Agreement    This is an agreement between you and your provider about the safe and appropriate use of controlled substance/opioids prescribed by your care team. Controlled substances are medicines that can cause physical and mental dependence (abuse).    There are strict laws about having and using these medicines. We here at [facility] are committing to working with you in your efforts to get better. To support you in this work, we ll help you schedule regular office appointments for medicine refills. If we must cancel or change your appointment for any reason, we ll make sure you have enough medicine to last until your next appointment.     As a Provider, I will:  Listen carefully to your concerns and treat you with respect.   Recommend a treatment plan that I believe is in your best interest. This plan may involve therapies other than opioid pain medication.   Talk with you often about the possible benefits, and the risk of harm of any medicine that we prescribe for you.   Provide a plan on how to taper (discontinue or go off) using this medicine if the decision is made to stop its use.    As a Patient, I understand that opioid(s):   Are a controlled substance prescribed by my care team to help me function or work and manage my condition(s).   Are strong medicines and can cause serious side effects such as:  Drowsiness, which can seriously affect my driving ability  A lower breathing rate, enough to cause death  Harm to my thinking ability   Depression   Abuse of and addiction to this medicine  Need to be taken exactly as prescribed. Combining opioids with certain medicines or chemicals (such as illegal drugs, sedatives, sleeping pills, and benzodiazepines) can be dangerous or even fatal. If I stop opioids suddenly, I may have severe withdrawal symptoms.  Do not work for all types of pain nor for all patients. If they re not helpful, I may be asked to stop  them.      The risks, benefits and side effects of these medicine(s) were explained to me. I agree that:  I will take part in other treatments as advised by my care team. This may be psychiatry or counseling, physical therapy, behavioral therapy, group treatment or a referral to a specialist.     I will keep all my appointments. I understand that this is part of the monitoring of opioids. My care team may require an office visit for EVERY opioid/controlled substance refill. If I miss appointments or don t follow instructions, my care team may stop my medicine.    I will take my medicines as prescribed. I will not change the dose or schedule unless my care team tells me to. There will be no refills if I run out early.     I may be asked to come to the clinic and complete a urine drug test or complete a pill count at any time. If I don t give a urine sample or participate in a pill count, the care team may stop my medicine.    I will only receive prescriptions from this clinic for chronic pain. If I am treated by another provider for acute pain issues, I will tell them that I am taking opioid pain medication for chronic pain and that I have a treatment agreement with this provider. I will inform my [facility] care team within one business day if I am given a prescription for any pain medication by another healthcare provider. My [facility] care team can contact other providers and pharmacists about my use of any medicines.    It is up to me to make sure that I don t run out of my medicines on weekends or holidays. If my care team is willing to refill my opioid prescription without a visit, I must request refills only during office hours. Refills may take up to 3 business days to process. I will use one pharmacy to fill all my opioid and other controlled substance prescriptions. I will notify the clinic about any changes to my insurance or medication availability.    I am responsible for my prescriptions.  If the medicine/prescription is lost, stolen or destroyed, it will not be replaced. I also agree not to share controlled substance medicines with anyone.    I am aware I should not use any illegal or recreational drugs. I agree not to drink alcohol unless my care team says I can.       If I enroll in the [place] Medical Cannabis program, I will tell my care team prior to my next refill.     I will tell my care team right away if I become pregnant, have a new medical problem treated outside of my regular clinic, or have a change in my medications.    I understand that this medicine can affect my thinking, judgment and reaction time. Alcohol and drugs affect the brain and body, which can affect the safety of my driving. Being under the influence of alcohol or drugs can affect my decision-making, behaviors, personal safety, and the safety of others. Driving while impaired (DWI) can occur if a person is driving, operating, or in physical control of a car, motorcycle, boat, snowmobile, ATV, motorbike, off-road vehicle, or any other motor vehicle ([place] Statute 169A.20). I understand the risk if I choose to drive or operate any vehicle or machinery.    I understand that if I do not follow any of the conditions above, my prescriptions or treatment may be stopped or changed.          Opioids  What You Need to Know    What are opioids?   Opioids are pain medicines that must be prescribed by a doctor. They are also known as narcotics.     Examples are:   morphine (MS Contin, Sara)  oxycodone (Oxycontin)  oxycodone and acetaminophen (Percocet)  hydrocodone and acetaminophen (Vicodin, Norco)   fentanyl patch (Duragesic)   hydromorphone (Dilaudid)   methadone  codeine (Tylenol #3)     What do opioids do well?   Opioids are best for severe short-term pain such as after a surgery or injury. They may work well for cancer pain. They may help some people with long-lasting (chronic) pain.     What do opioids NOT do well?   Opioids  never get rid of pain entirely, and they don t work well for most patients with chronic pain. Opioids don t reduce swelling, one of the causes of pain.                                    Other ways to manage chronic pain and improve function include:     Treat the health problem that may be causing pain  Anti-inflammation medicines, which reduce swelling and tenderness, such as ibuprofen (Advil, Motrin) or naproxen (Aleve)  Acetaminophen (Tylenol)  Antidepressants and anti-seizure medicines, especially for nerve pain  Topical treatments such as patches or creams  Injections or nerve blocks  Chiropractic or osteopathic treatment  Acupuncture, massage, deep breathing, meditation, visual imagery, aromatherapy  Use heat or ice at the pain site  Physical therapy   Exercise  Stop smoking  Take part in therapy       Risks and side effects     Talk to your doctor before you start or decide to keep taking opioids. Possible side effects include:    Lowering your breathing rate enough to cause death  Overdose, including death, especially if taking higher than prescribed doses  Worse depression symptoms; less pleasure in things you usually enjoy  Feeling tired or sluggish  Slower thoughts or cloudy thinking  Being more sensitive to pain over time; pain is harder to control  Trouble sleeping or restless sleep  Changes in hormone levels (for example, less testosterone)  Changes in sex drive or ability to have sex  Constipation  Unsafe driving  Itching and sweating  Dizziness  Nausea, throwing up and dry mouth    What else should I know about opioids?    Opioids may lead to dependence, tolerance, or addiction.    Dependence means that if you stop or reduce the medicine too quickly, you will have withdrawal symptoms. These include loose poop (diarrhea), jitters, flu-like symptoms, nervousness and tremors. Dependence is not the same as addiction.                     Tolerance means needing higher doses over time to get the same  effect. This may increase the chance of serious side effects.    Addiction is when people improperly use a substance that harms their body, their mind or their relations with others. Use of opiates can cause a relapse of addiction if you have a history of drug or alcohol abuse.    People who have used opioids for a long time may have a lower quality of life, worse depression, higher levels of pain and more visits to doctors.    You can overdose on opioids. Take these steps to lower your risk of overdose:    Recognize the signs:  Signs of overdose include decrease or loss of consciousness (blackout), slowed breathing, trouble waking up and blue lips. If someone is worried about overdose, they should call 911.    Talk to your doctor about Narcan (naloxone).   If you are at risk for overdose, you may be given a prescription for Narcan. This medicine very quickly reverses the effects of opioids.   If you overdose, a friend or family member can give you Narcan while waiting for the ambulance. They need to know the signs of overdose and how to give Narcan.     Don't use alcohol or street drugs.   Taking them with opioids can cause death.    Do not take any of these medicines unless your doctor says it s OK. Taking these with opioids can cause death:  Benzodiazepines, such as lorazepam (Ativan), alprazolam (Xanax) or diazepam (Valium)  Muscle relaxers, such as cyclobenzaprine (Flexeril)  Sleeping pills like zolpidem (Ambien)   Other opioids      How to keep you and other people safe while taking opioids:    Never share your opioids with others.  Opioid medicines are regulated by the Drug Enforcement Agency (QUINN). Selling or sharing medications is a criminal act.    2. Be sure to store opioids in a secure place, locked up if possible. Young children can easily swallow them and overdose.    3. When you are traveling with your medicines, keep them in the original bottles. If you use a pill box, be sure you also carry a copy  of your medicine list from your clinic or pharmacy.    4. Safe disposal of opioids    Most pharmacies have places to get rid of medicine, called disposal kiosks. Medicine disposal options are also available in every [facility]. Search your county and  medication disposal  to find more options. You can find more details at:  https://www.pca.[facility].[place]./living-green/managing-unwanted-medications     I agree that my provider, clinic care team, and pharmacy may work with any city, state or federal law enforcement agency that investigates the misuse, sale, or other diversion of my controlled medicine. I will allow my provider to discuss my care with, or share a copy of, this agreement with any other treating provider, pharmacy or emergency room where I receive care.    I have read this agreement and have asked questions about anything I did not understand.    _______________________________________________________  Patient Signature - Jigna Amador _____________________                   Date     _______________________________________________________  Provider Signature - Barbara Esquivel MD   _____________________                   Date     _______________________________________________________  Witness Signature (required if provider not present while patient signing)   _____________________                   Date

## 2024-01-05 ENCOUNTER — TELEPHONE (OUTPATIENT)
Dept: FAMILY MEDICINE | Facility: CLINIC | Age: 34
End: 2024-01-05
Payer: COMMERCIAL

## 2024-01-05 DIAGNOSIS — I10 HYPERTENSION GOAL BP (BLOOD PRESSURE) < 140/90: ICD-10-CM

## 2024-01-05 DIAGNOSIS — N64.4 BREAST PAIN: ICD-10-CM

## 2024-01-05 DIAGNOSIS — N94.6 DYSMENORRHEA: ICD-10-CM

## 2024-01-05 RX ORDER — HYDROCHLOROTHIAZIDE 50 MG/1
50 TABLET ORAL DAILY
Qty: 90 TABLET | Refills: 1 | OUTPATIENT
Start: 2024-01-05

## 2024-01-05 RX ORDER — OXYCODONE HYDROCHLORIDE 10 MG/1
10 TABLET ORAL 2 TIMES DAILY PRN
Qty: 40 TABLET | Refills: 0 | Status: SHIPPED | OUTPATIENT
Start: 2024-01-05 | End: 2024-02-02

## 2024-01-05 NOTE — TELEPHONE ENCOUNTER
Medication Question or Refill        What medication are you calling about (include dose and sig)?: oxyCODONE IR (ROXICODONE) 10 MG tablet   hydrochlorothiazide (HYDRODIURIL) 50 MG tablet     Preferred Pharmacy:    [facility]/pharmacy [facility][id number] - [place] - [address]  [facility] [address]  Phone: [phone] Fax: [phone]      Controlled Substance Agreement on file:   CSA -- Patient Level:     [Media Unavailable] Controlled Substance Agreement - Opioid - Scan on 12/12/2023  9:44 AM: CSA   [Media Unavailable] Controlled Substance Agreement - Opioid - Scan on 12/21/2022  2:23 PM: Opioid   [Media Unavailable] Controlled Substance Agreement - Opioid - Scan on 6/25/2021  2:59 PM   [Media Unavailable] Controlled Substance Agreement - Opioid - Scan on 3/3/2020  7:08 AM: Opioid       Who prescribed the medication?: Dr Esquivel    Do you need a refill? Yes    When did you use the medication last? daily    Could we send this information to you in [facility] or would you prefer to receive a phone call?:   Patient would prefer a phone call   Okay to leave a detailed message?: Yes at Cell number on file:    Telephone Information:   Mobile [phone]   Mobile [phone]

## 2024-01-05 NOTE — TELEPHONE ENCOUNTER
Patient has forms from the county that needs to be completed. Can we use a same day to schedule in person visit.?

## 2024-01-05 NOTE — TELEPHONE ENCOUNTER
LVM for pt to call back. Please let sched using a same day as approved by PCP below.  Coreen Freire CMA

## 2024-02-02 ENCOUNTER — TELEPHONE (OUTPATIENT)
Dept: FAMILY MEDICINE | Facility: CLINIC | Age: 34
End: 2024-02-02
Payer: COMMERCIAL

## 2024-02-02 DIAGNOSIS — N94.6 DYSMENORRHEA: ICD-10-CM

## 2024-02-02 DIAGNOSIS — N64.4 BREAST PAIN: ICD-10-CM

## 2024-02-02 RX ORDER — OXYCODONE HYDROCHLORIDE 10 MG/1
10 TABLET ORAL 2 TIMES DAILY PRN
Qty: 40 TABLET | Refills: 0 | Status: SHIPPED | OUTPATIENT
Start: 2024-02-02 | End: 2024-02-28

## 2024-02-02 NOTE — TELEPHONE ENCOUNTER
I have taken care of the refill, but I think all refills should go through the refill process and not sent to us directly as a telephone call.  That way they can be scrubbed by our team before coming to us.      Refilled - pain follow up due March

## 2024-02-02 NOTE — TELEPHONE ENCOUNTER
Reason for Call:  Medication or medication refill:    Do you use a [facility]?  Name of the pharmacy and phone number for the current request:  [facility] [place], [place]     Name of the medication requested: oxyCODONE IR (ROXICODONE) 10 MG tablet     Other request:     Can we leave a detailed message on this number? YES    Phone number patient can be reached at: Cell number on file:    Telephone Information:   Mobile [phone]   Mobile [phone]       Best Time:     Call taken on 2/2/2024 at 1:30 PM by Mila Roach

## 2024-02-28 DIAGNOSIS — N94.6 DYSMENORRHEA: ICD-10-CM

## 2024-02-28 DIAGNOSIS — N64.4 BREAST PAIN: ICD-10-CM

## 2024-02-28 RX ORDER — OXYCODONE HYDROCHLORIDE 10 MG/1
10 TABLET ORAL 2 TIMES DAILY PRN
Qty: 40 TABLET | Refills: 0 | Status: SHIPPED | OUTPATIENT
Start: 2024-02-28 | End: 2024-03-27

## 2024-02-28 NOTE — TELEPHONE ENCOUNTER
Patient calling to request refill of oxyCODONE IR (ROXICODONE) 10 MG tablet.    Routing refill request to provider for review/approval because:  Drug not on the [facility] refill protocol     TK Pérez  [facility] Triage

## 2024-03-27 ENCOUNTER — TELEPHONE (OUTPATIENT)
Dept: FAMILY MEDICINE | Facility: CLINIC | Age: 34
End: 2024-03-27

## 2024-03-27 ENCOUNTER — HOSPITAL ENCOUNTER (EMERGENCY)
Facility: CLINIC | Age: 34
Discharge: HOME | End: 2024-03-28
Attending: EMERGENCY MEDICINE | Admitting: EMERGENCY MEDICINE
Payer: COMMERCIAL

## 2024-03-27 DIAGNOSIS — R10.84 GENERALIZED ABDOMINAL PAIN: ICD-10-CM

## 2024-03-27 DIAGNOSIS — N64.4 BREAST PAIN: ICD-10-CM

## 2024-03-27 DIAGNOSIS — N94.6 DYSMENORRHEA: ICD-10-CM

## 2024-03-27 LAB
ABO/RH(D): NORMAL
ALBUMIN SERPL BCG-MCNC: 4.3 G/DL (ref 3.5–5.2)
ALP SERPL-CCNC: 67 U/L (ref 40–150)
ALT SERPL W P-5'-P-CCNC: 12 U/L (ref 0–50)
ANION GAP SERPL CALCULATED.3IONS-SCNC: 17 MMOL/L (ref 7–15)
ANTIBODY SCREEN: NEGATIVE
AST SERPL W P-5'-P-CCNC: 19 U/L (ref 0–45)
BASOPHILS # BLD AUTO: 0 10E3/UL (ref 0–0.2)
BASOPHILS NFR BLD AUTO: 1 %
BILIRUB SERPL-MCNC: 0.7 MG/DL
BUN SERPL-MCNC: 13.9 MG/DL (ref 6–20)
CALCIUM SERPL-MCNC: 9.5 MG/DL (ref 8.6–10)
CHLORIDE SERPL-SCNC: 101 MMOL/L (ref 98–107)
CREAT SERPL-MCNC: 0.74 MG/DL (ref 0.51–0.95)
DEPRECATED HCO3 PLAS-SCNC: 20 MMOL/L (ref 22–29)
EGFRCR SERPLBLD CKD-EPI 2021: >90 ML/MIN/1.73M2
EOSINOPHIL # BLD AUTO: 0 10E3/UL (ref 0–0.7)
EOSINOPHIL NFR BLD AUTO: 0 %
ERYTHROCYTE [DISTWIDTH] IN BLOOD BY AUTOMATED COUNT: 14.5 % (ref 10–15)
GLUCOSE SERPL-MCNC: 88 MG/DL (ref 70–99)
HCG SERPL QL: NEGATIVE
HCT VFR BLD AUTO: 41.3 % (ref 35–47)
HGB BLD-MCNC: 13.3 G/DL (ref 11.7–15.7)
HOLD SPECIMEN: NORMAL
IMM GRANULOCYTES # BLD: 0 10E3/UL
IMM GRANULOCYTES NFR BLD: 0 %
INR PPP: 1.01 (ref 0.85–1.15)
LIPASE SERPL-CCNC: 23 U/L (ref 13–60)
LYMPHOCYTES # BLD AUTO: 1.6 10E3/UL (ref 0.8–5.3)
LYMPHOCYTES NFR BLD AUTO: 27 %
MCH RBC QN AUTO: 26.8 PG (ref 26.5–33)
MCHC RBC AUTO-ENTMCNC: 32.2 G/DL (ref 31.5–36.5)
MCV RBC AUTO: 83 FL (ref 78–100)
MONOCYTES # BLD AUTO: 0.4 10E3/UL (ref 0–1.3)
MONOCYTES NFR BLD AUTO: 7 %
NEUTROPHILS # BLD AUTO: 3.9 10E3/UL (ref 1.6–8.3)
NEUTROPHILS NFR BLD AUTO: 65 %
NRBC # BLD AUTO: 0 10E3/UL
NRBC BLD AUTO-RTO: 0 /100
PLATELET # BLD AUTO: 325 10E3/UL (ref 150–450)
POTASSIUM SERPL-SCNC: 3.8 MMOL/L (ref 3.4–5.3)
PROT SERPL-MCNC: 7.7 G/DL (ref 6.4–8.3)
RBC # BLD AUTO: 4.97 10E6/UL (ref 3.8–5.2)
SODIUM SERPL-SCNC: 138 MMOL/L (ref 135–145)
SPECIMEN EXPIRATION DATE: NORMAL
WBC # BLD AUTO: 5.9 10E3/UL (ref 4–11)

## 2024-03-27 PROCEDURE — 83690 ASSAY OF LIPASE: CPT | Performed by: EMERGENCY MEDICINE

## 2024-03-27 PROCEDURE — C9113 INJ PANTOPRAZOLE SODIUM, VIA: HCPCS | Performed by: EMERGENCY MEDICINE

## 2024-03-27 PROCEDURE — 96361 HYDRATE IV INFUSION ADD-ON: CPT

## 2024-03-27 PROCEDURE — 96374 THER/PROPH/DIAG INJ IV PUSH: CPT | Mod: 59

## 2024-03-27 PROCEDURE — 85610 PROTHROMBIN TIME: CPT | Performed by: EMERGENCY MEDICINE

## 2024-03-27 PROCEDURE — 36415 COLL VENOUS BLD VENIPUNCTURE: CPT | Performed by: EMERGENCY MEDICINE

## 2024-03-27 PROCEDURE — 84703 CHORIONIC GONADOTROPIN ASSAY: CPT | Performed by: EMERGENCY MEDICINE

## 2024-03-27 PROCEDURE — 250N000011 HC RX IP 250 OP 636: Performed by: EMERGENCY MEDICINE

## 2024-03-27 PROCEDURE — 250N000013 HC RX MED GY IP 250 OP 250 PS 637: Performed by: EMERGENCY MEDICINE

## 2024-03-27 PROCEDURE — 80053 COMPREHEN METABOLIC PANEL: CPT | Performed by: EMERGENCY MEDICINE

## 2024-03-27 PROCEDURE — 85025 COMPLETE CBC W/AUTO DIFF WBC: CPT | Performed by: EMERGENCY MEDICINE

## 2024-03-27 PROCEDURE — 258N000003 HC RX IP 258 OP 636: Performed by: EMERGENCY MEDICINE

## 2024-03-27 PROCEDURE — 85379 FIBRIN DEGRADATION QUANT: CPT | Performed by: EMERGENCY MEDICINE

## 2024-03-27 PROCEDURE — 99292 CRITICAL CARE ADDL 30 MIN: CPT

## 2024-03-27 PROCEDURE — 86900 BLOOD TYPING SEROLOGIC ABO: CPT | Performed by: EMERGENCY MEDICINE

## 2024-03-27 PROCEDURE — 96375 TX/PRO/DX INJ NEW DRUG ADDON: CPT

## 2024-03-27 PROCEDURE — 99291 CRITICAL CARE FIRST HOUR: CPT | Mod: 25

## 2024-03-27 RX ORDER — ONDANSETRON 2 MG/ML
4 INJECTION INTRAMUSCULAR; INTRAVENOUS EVERY 30 MIN PRN
Status: DISCONTINUED | OUTPATIENT
Start: 2024-03-27 | End: 2024-03-28 | Stop reason: HOSPADM

## 2024-03-27 RX ORDER — MAGNESIUM HYDROXIDE/ALUMINUM HYDROXICE/SIMETHICONE 120; 1200; 1200 MG/30ML; MG/30ML; MG/30ML
15 SUSPENSION ORAL ONCE
Status: COMPLETED | OUTPATIENT
Start: 2024-03-27 | End: 2024-03-27

## 2024-03-27 RX ADMIN — FAMOTIDINE 20 MG: 10 INJECTION, SOLUTION INTRAVENOUS at 23:20

## 2024-03-27 RX ADMIN — ALUMINUM HYDROXIDE, MAGNESIUM HYDROXIDE, AND DIMETHICONE 15 ML: 200; 20; 200 SUSPENSION ORAL at 23:21

## 2024-03-27 RX ADMIN — ONDANSETRON 4 MG: 2 INJECTION INTRAMUSCULAR; INTRAVENOUS at 23:17

## 2024-03-27 RX ADMIN — PANTOPRAZOLE SODIUM 40 MG: 40 INJECTION, POWDER, FOR SOLUTION INTRAVENOUS at 23:19

## 2024-03-27 RX ADMIN — SODIUM CHLORIDE, POTASSIUM CHLORIDE, SODIUM LACTATE AND CALCIUM CHLORIDE 1000 ML: 600; 310; 30; 20 INJECTION, SOLUTION INTRAVENOUS at 23:22

## 2024-03-27 ASSESSMENT — ACTIVITIES OF DAILY LIVING (ADL): ADLS_ACUITY_SCORE: 35

## 2024-03-27 NOTE — TELEPHONE ENCOUNTER
Medication Question or Refill        What medication are you calling about (include dose and sig)?: Oxycodone 10MG     Preferred Pharmacy:  [facility]/pharmacy #[id number] - [place] - [address]  [place] [address]  Phone: [phone] Fax: [phone]      Controlled Substance Agreement on file:   CSA -- Patient Level:     [Media Unavailable] Controlled Substance Agreement - Opioid - Scan on 12/12/2023  9:44 AM: CSA   [Media Unavailable] Controlled Substance Agreement - Opioid - Scan on 12/21/2022  2:23 PM: Opioid   [Media Unavailable] Controlled Substance Agreement - Opioid - Scan on 6/25/2021  2:59 PM   [Media Unavailable] Controlled Substance Agreement - Opioid - Scan on 3/3/2020  7:08 AM: Opioid       Who prescribed the medication?: Barbara Esquivel     Do you need a refill? Yes. Pt stated she fell and hit her back. She is requesting provider sent in a early fill

## 2024-03-27 NOTE — TELEPHONE ENCOUNTER
I cannot refill early - will  need to wait for follow up with Dr. Esquivel- no showed appointment 3/1/24   Notify patient and route back to Dr. Esquivel for review upon his return to clinic next week

## 2024-03-28 ENCOUNTER — APPOINTMENT (OUTPATIENT)
Dept: CT IMAGING | Facility: CLINIC | Age: 34
End: 2024-03-28
Attending: EMERGENCY MEDICINE
Payer: COMMERCIAL

## 2024-03-28 VITALS
TEMPERATURE: 98.8 F | SYSTOLIC BLOOD PRESSURE: 155 MMHG | HEART RATE: 86 BPM | DIASTOLIC BLOOD PRESSURE: 90 MMHG | RESPIRATION RATE: 18 BRPM | OXYGEN SATURATION: 100 %

## 2024-03-28 LAB
D DIMER PPP FEU-MCNC: 0.53 UG/ML FEU (ref 0–0.5)
HOLD SPECIMEN: NORMAL

## 2024-03-28 PROCEDURE — 74177 CT ABD & PELVIS W/CONTRAST: CPT

## 2024-03-28 PROCEDURE — 250N000011 HC RX IP 250 OP 636: Performed by: EMERGENCY MEDICINE

## 2024-03-28 PROCEDURE — 250N000009 HC RX 250: Performed by: EMERGENCY MEDICINE

## 2024-03-28 RX ORDER — PANTOPRAZOLE SODIUM 40 MG/1
40 TABLET, DELAYED RELEASE ORAL DAILY
Qty: 30 TABLET | Refills: 0 | Status: SHIPPED | OUTPATIENT
Start: 2024-03-28 | End: 2024-04-27

## 2024-03-28 RX ORDER — IOPAMIDOL 755 MG/ML
135 INJECTION, SOLUTION INTRAVASCULAR ONCE
Status: COMPLETED | OUTPATIENT
Start: 2024-03-28 | End: 2024-03-28

## 2024-03-28 RX ORDER — ACETAMINOPHEN 500 MG
1000 TABLET ORAL ONCE
Status: COMPLETED | OUTPATIENT
Start: 2024-03-28 | End: 2024-03-28

## 2024-03-28 RX ADMIN — IOPAMIDOL 135 ML: 755 INJECTION, SOLUTION INTRAVENOUS at 01:35

## 2024-03-28 RX ADMIN — SODIUM CHLORIDE 100 ML: 9 INJECTION, SOLUTION INTRAVENOUS at 01:35

## 2024-03-28 ASSESSMENT — ACTIVITIES OF DAILY LIVING (ADL)
ADLS_ACUITY_SCORE: 35
ADLS_ACUITY_SCORE: 35

## 2024-03-28 NOTE — ED TRIAGE NOTES
BIBA from her own home due to black tarry stool and coughing out of blackish discoloration since 3 days ago.  Reported had ruptured gastric ulcer a year ago,underwent surgery and blood transfusion at that time.  K/c HTN on treatment.     Triage Assessment (Adult)       Row Name 03/27/24 7295          Triage Assessment    Airway WDL WDL        Respiratory WDL    Respiratory WDL WDL        Skin Circulation/Temperature WDL    Skin Circulation/Temperature WDL WDL        Cardiac WDL    Cardiac WDL X  elevated BP        Peripheral/Neurovascular WDL    Peripheral Neurovascular WDL WDL        Cognitive/Neuro/Behavioral WDL    Cognitive/Neuro/Behavioral WDL WDL

## 2024-03-28 NOTE — ED PROVIDER NOTES
History     Chief Complaint:  Dark stool and spitting up dark       HPI   Jigna Amador is a 34 year old female with history of hypertension who presents via EMS for evaluation of having dark stool and dark spit along with abdominal pain. The patient states for the past three days she has been experiencing dark stool, abdominal pain, sore throat, congestion and today she had an episode of dark emesis before leaving the house for the ED. She reports to the ED because her symptoms are not improving. She reports rupturing a gastric ulcer last year in [place] while attending affairs regarding her mother who passed away. After the rupture she experienced an episode syncope and lost 5 pints of blood. She was then taken to a hospital and given a blood transfusion. She was hospitalized for a month and was put on blood thinners after finding blood clots in her right lung and left leg. She is currently taking oxycodone for her back and abdominal pain with indeterminate relief. She notes her primary being Dr. Esquivel from [facility].     Independent Historian:   None - Patient Only    Review of External Notes:   Reviewed ED noted from  [facility] [place]23 for abdominal pain and black emesis.      Medications:    pantoprazole (PROTONIX) 40 MG EC tablet  clotrimazole (LOTRIMIN) 1 % external cream  cyclobenzaprine (FLEXERIL) 10 MG tablet  hydrochlorothiazide (HYDRODIURIL) 50 MG tablet  menthol-zinc oxide (CALMOSEPTINE) 0.44-20.6 % OINT ointment  multivitamin (ONE-DAILY) tablet  oxyCODONE IR (ROXICODONE) 10 MG tablet  sertraline (ZOLOFT) 50 MG tablet  triamcinolone (KENALOG) 0.1 % external cream  zinc oxide (DESITIN) 20 % external ointment        Past Medical History:    Past Medical History:   Diagnosis Date    Hypertension        Past Surgical History:    No past surgical history on file.     Physical Exam   Patient Vitals for the past 24 hrs:   BP Temp Temp src Pulse Resp SpO2   03/28/24 0223 (!) 155/90 98.8  F (37.1  C)  Temporal 86 18 100 %   03/27/24 2300 -- -- -- -- 15 100 %   03/27/24 2234 (!) 164/76 -- -- 74 17 100 %   03/27/24 2227 (!) 196/103 98  F (36.7  C) Oral 73 17 100 %        Physical Exam  General: Appears well-developed and well-nourished.   Head: No signs of trauma.   CV: Normal rate and regular rhythm.    Resp: Effort normal and breath sounds normal. No respiratory distress.   GI: Soft. There is diffuse tenderness with very light touch.  No rebound or guarding.  Normal bowel sounds.  No CVA tenderness.  Rectal:  No blood or melena on DANIELLE.  No brown stool present  MSK: Normal range of motion. no edema. No Calf tenderness.  Neuro: The patient is alert and oriented. Speech normal.  Skin: Skin is warm and dry. No rash noted.   Psych: normal mood and affect. behavior is normal.       Emergency Department Course   Imaging:  CT Chest (PE) Abdomen Pelvis w Contrast   Final Result   IMPRESSION:   No acute process in the chest, abdomen or pelvis.             Laboratory:  Labs Ordered and Resulted from Time of ED Arrival to Time of ED Departure   COMPREHENSIVE METABOLIC PANEL - Abnormal       Result Value    Sodium 138      Potassium 3.8      Carbon Dioxide (CO2) 20 (*)     Anion Gap 17 (*)     Urea Nitrogen 13.9      Creatinine 0.74      GFR Estimate >90      Calcium 9.5      Chloride 101      Glucose 88      Alkaline Phosphatase 67      AST 19      ALT 12      Protein Total 7.7      Albumin 4.3      Bilirubin Total 0.7     D DIMER QUANTITATIVE - Abnormal    D-Dimer Quantitative 0.53 (*)    HCG QUALITATIVE PREGNANCY - Normal    hCG Serum Qualitative Negative     INR - Normal    INR 1.01     LIPASE - Normal    Lipase 23     CBC WITH PLATELETS AND DIFFERENTIAL    WBC Count 5.9      RBC Count 4.97      Hemoglobin 13.3      Hematocrit 41.3      MCV 83      MCH 26.8      MCHC 32.2      RDW 14.5      Platelet Count 325      % Neutrophils 65      % Lymphocytes 27      % Monocytes 7      % Eosinophils 0      % Basophils 1      %  Immature Granulocytes 0      NRBCs per 100 WBC 0      Absolute Neutrophils 3.9      Absolute Lymphocytes 1.6      Absolute Monocytes 0.4      Absolute Eosinophils 0.0      Absolute Basophils 0.0      Absolute Immature Granulocytes 0.0      Absolute NRBCs 0.0     TYPE AND SCREEN, ADULT    ABO/RH(D) O POS      Antibody Screen Negative      SPECIMEN EXPIRATION DATE [id number]     ABO/RH TYPE AND SCREEN        Emergency Department Course & Assessments:    Interventions:  Medications   famotidine (PEPCID) injection 20 mg (20 mg Intravenous $Given 3/27/24 2320)   alum & mag hydroxide-simethicone (MAALOX) suspension 15 mL (15 mLs Oral $Given 3/27/24 2321)   pantoprazole (PROTONIX) IV push injection 40 mg (40 mg Intravenous $Given 3/27/24 2319)   lactated ringers BOLUS 1,000 mL (0 mLs Intravenous Stopped 3/28/24 0231)   acetaminophen (TYLENOL) tablet 1,000 mg (1,000 mg Oral Not Given 3/28/24 0025)   iopamidol (ISOVUE-370) solution 135 mL (135 mLs Intravenous $Given 3/28/24 0135)   Saline Flush (100 mLs Intravenous $Given 3/28/24 0135)        Assessments:  2252 I obtained history and performed physical exam as noted above.   2356 I rechecked and updated the patient.    Independent Interpretation (X-rays, CTs, rhythm strip):  On  my independent interpretation of CAP CT there is no significant hydronephrosis and no clear large central PE noted.    Social Determinants of Health affecting care:   None    Disposition:  The patient was discharged.     Impression & Plan      Medical Decision Making:  Jigna Amador presents due to abdominal pain along with concerns for dark stool and dark spit.  She states that she has had the symptoms over the last 3 days.  On my evaluation, the patient had appropriate vital signs.  She had diffuse abdominal tenderness with even very light touch of the abdomen.  Patient reports that she has a history of possible ruptured gastric ulcer that was treated in [place].  Unfortunately the patient could  not tell me which hospital she had gone to and I do not have access to these records.  Given the concern for possible blood in the stool, I did recommend doing rectal exam which the patient did consent to.  This was completed with the female nurse present.  The rectal exam was reassuring with no signs of melena or bleeding.  Normal brown stool was present.  I did obtain blood work which showed a normal hemoglobin.  She stated that she was spitting blood.  She was not vomiting blood and not coughing up blood, but spitting it.  I did obtain a D-dimer as the patient does report a history of pulmonary embolism in the past.  This did come back elevated so a CT scan was obtained.  I also included the abdomen given the report of abdominal pain.  This did not show any signs of acute process.    On chart review, the patient had been seen at [facility] 12/23 with what sounds to be a very similar presentation.  At that time she had a very reassuring workup as well.  I did note that the patient is on chronic oxycodone for chronic abdominal pain.  There is a phone call note earlier in the day with the patient requesting a refill for the oxycodone.  I did discuss the patient's chronic abdominal pain with her.  I discussed her use of opiates and whether she has been continuing to use them.  She states that she uses them a couple of times a week.  Based on this information, the patient should have number of oxycodone left from her last refill.  Initially the patient stated that she did have oxycodone at home, but later stated that her aunt had stolen her medication.    While in the emergency department, the patient asked for food and juice and was able to eat a full meal tray along with drink juice and fluids without any apparent difficulty.  Given the reassuring workup with no clear melena, appropriate hemoglobin, and reassuring CT imaging, at this time I do not see any emergent etiology of her symptoms.  It is certainly possible her  symptoms could be related to gastritis or peptic ulcer disease.  I recommended a simple diet as patient reports that she does like to eat spicy foods and that she continue with her PPI.  I recommended she continue to speak with her primary care doctor regarding refills of her oxycodone.      Diagnosis:    ICD-10-CM    1. Generalized abdominal pain  R10.84            Discharge Medications:  Discharge Medication List as of 3/28/2024  2:31 AM             Scribe Disclosure:  I, Korey Vargas, am serving as a scribe at 11:11 PM on 3/27/2024 to document services personally performed by Mick Freedman MD based on my observations and the provider's statements to me.     3/27/2024   Mick Freedman MD Bergenstal, John A, MD  03/28/24 0651

## 2024-03-28 NOTE — ED NOTES
Bed: ED21  Expected date: 3/27/24  Expected time: 10:25 PM  Means of arrival: Ambulance  Comments:  HEMS 412 34F dark stools, coughing blood; hx of ulcers

## 2024-03-30 RX ORDER — OXYCODONE HYDROCHLORIDE 10 MG/1
10 TABLET ORAL 2 TIMES DAILY PRN
Qty: 40 TABLET | Refills: 0 | Status: SHIPPED | OUTPATIENT
Start: 2024-03-30 | End: 2024-04-26

## 2024-04-24 DIAGNOSIS — N94.6 DYSMENORRHEA: ICD-10-CM

## 2024-04-24 DIAGNOSIS — I10 HYPERTENSION GOAL BP (BLOOD PRESSURE) < 140/90: ICD-10-CM

## 2024-04-24 DIAGNOSIS — N64.4 BREAST PAIN: ICD-10-CM

## 2024-04-24 NOTE — TELEPHONE ENCOUNTER
Reason for Call:  Medication or medication refill:    Do you use a [facility]?  Name of the pharmacy and phone number for the current request:  [facility] [place], [place]     Name of the medication requested: oxyCODONE IR (ROXICODONE) 10 MG tablet   hydrochlorothiazide (HYDRODIURIL) 50 MG tablet   Other request:     Can we leave a detailed message on this number? YES    Phone number patient can be reached at: Cell number on file:    Telephone Information:   Mobile [phone]       Best Time:     Call taken on 4/24/2024 at 4:29 PM by Mila Roach

## 2024-04-25 RX ORDER — OXYCODONE HYDROCHLORIDE 10 MG/1
10 TABLET ORAL 2 TIMES DAILY PRN
Qty: 40 TABLET | Refills: 0 | OUTPATIENT
Start: 2024-04-25

## 2024-04-25 RX ORDER — HYDROCHLOROTHIAZIDE 50 MG/1
50 TABLET ORAL DAILY
Qty: 90 TABLET | Refills: 1 | OUTPATIENT
Start: 2024-04-25

## 2024-04-25 NOTE — TELEPHONE ENCOUNTER
Pt calling to follow up on refill requests. Per chart review, hydrochlorothiazide denied d/t being too soon, although it appears there should be refills on file. RN confirmed with pharmacy that there is a refill on file and med is waiting for pt  .     Pt also requesting prescription for muscle relaxer. Per chart review, prescription for cyclobenzaprine (FLEXERIL) 10 MG tablet was sent 10/17/23 with 2 refills. Pt states she has not refilled this twice, RN advised pt request refill from pharmacy. Pt will call pharmacy.     Request for oxycodone has been sent to provider and is awaiting provider review. Pt verbalized understanding.     Cheryl Orellana RN

## 2024-04-26 DIAGNOSIS — N64.4 BREAST PAIN: ICD-10-CM

## 2024-04-26 DIAGNOSIS — N94.6 DYSMENORRHEA: ICD-10-CM

## 2024-04-26 NOTE — TELEPHONE ENCOUNTER
Refill for oxycodone denied.  She needs to be seen every 3 months and her last visit was in December.  Had an appointment scheduled a couple of days ago and no showed.  Virtual would work.

## 2024-04-26 NOTE — TELEPHONE ENCOUNTER
Medication Question or Refill  Pt asking for refill today she is going out of town, She missed her appt because her medical ride did not get there      What medication are you calling about (include dose and sig)?: oxyCODONE IR (ROXICODONE) 10 MG tablet     Preferred Pharmacy:    [facility]/pharmacy #[id number] - [place] - [address] [address]  Phone: [phone] Fax: [phone]  Phone: [phone] Fax: [phone]      Controlled Substance Agreement on file:   CSA -- Patient Level:     [Media Unavailable] Controlled Substance Agreement - Opioid - Scan on 12/12/2023  9:44 AM: CSA   [Media Unavailable] Controlled Substance Agreement - Opioid - Scan on 12/21/2022  2:23 PM: Opioid   [Media Unavailable] Controlled Substance Agreement - Opioid - Scan on 6/25/2021  2:59 PM   [Media Unavailable] Controlled Substance Agreement - Opioid - Scan on 3/3/2020  7:08 AM: Opioid       Who prescribed the medication?: Barbara Esquivel      Do you need a refill? Yes    Patient offered an appointment? Yes: 5/17    Do you have any questions or concerns?  No

## 2024-04-28 RX ORDER — OXYCODONE HYDROCHLORIDE 10 MG/1
10 TABLET ORAL 2 TIMES DAILY PRN
Qty: 40 TABLET | Refills: 0 | Status: SHIPPED | OUTPATIENT
Start: 2024-04-29 | End: 2024-06-04

## 2024-05-30 ENCOUNTER — MYC REFILL (OUTPATIENT)
Dept: FAMILY MEDICINE | Facility: CLINIC | Age: 34
End: 2024-05-30
Payer: COMMERCIAL

## 2024-05-30 DIAGNOSIS — N64.4 BREAST PAIN: ICD-10-CM

## 2024-05-30 DIAGNOSIS — N94.6 DYSMENORRHEA: ICD-10-CM

## 2024-05-30 RX ORDER — OXYCODONE HYDROCHLORIDE 10 MG/1
10 TABLET ORAL 2 TIMES DAILY PRN
Qty: 40 TABLET | Refills: 0 | OUTPATIENT
Start: 2024-05-30

## 2024-05-30 RX ORDER — OXYCODONE HYDROCHLORIDE 10 MG/1
10 TABLET ORAL 2 TIMES DAILY PRN
Qty: 40 TABLET | Refills: 0 | Status: CANCELLED | OUTPATIENT
Start: 2024-05-30

## 2024-05-30 NOTE — TELEPHONE ENCOUNTER
Medication Question or Refill        What medication are you calling about (include dose and sig)?: oxyCODONE IR (ROXICODONE) 10 MG tablet     Preferred Pharmacy:  [facility]/pharmacy #[id number]  [place] - [address]  [facility] [address]  Phone: [phone] Fax: [phone]        Controlled Substance Agreement on file:   CSA -- Patient Level:     [Media Unavailable] Controlled Substance Agreement - Opioid - Scan on 12/12/2023  9:44 AM: CSA   [Media Unavailable] Controlled Substance Agreement - Opioid - Scan on 12/21/2022  2:23 PM: Opioid   [Media Unavailable] Controlled Substance Agreement - Opioid - Scan on 6/25/2021  2:59 PM   [Media Unavailable] Controlled Substance Agreement - Opioid - Scan on 3/3/2020  7:08 AM: Opioid       Who prescribed the medication?: Dr. Esquivel    Do you need a refill? Yes    When did you use the medication last? daily    Patient offered an appointment? No    Do you have any questions or concerns?  No      Could we send this information to you in [facility] or would you prefer to receive a phone call?:   Patient would prefer a phone call   Okay to leave a detailed message?: Yes at Cell number on file:    Telephone Information:   Mobile [phone]

## 2024-05-31 RX ORDER — OXYCODONE HYDROCHLORIDE 10 MG/1
10 TABLET ORAL 2 TIMES DAILY PRN
Qty: 40 TABLET | Refills: 0 | Status: CANCELLED | OUTPATIENT
Start: 2024-05-31

## 2024-05-31 NOTE — TELEPHONE ENCOUNTER
Refill denied - she no showed her last visit which was long overdue.  I know she is scheduled to see me 6/4 - she will have to keep that appointment for the refill.  Not interested in refilling yet again and having her no show again.

## 2024-06-01 DIAGNOSIS — I10 HYPERTENSION GOAL BP (BLOOD PRESSURE) < 140/90: ICD-10-CM

## 2024-06-03 RX ORDER — HYDROCHLOROTHIAZIDE 50 MG/1
50 TABLET ORAL DAILY
Qty: 90 TABLET | Refills: 1 | Status: SHIPPED | OUTPATIENT
Start: 2024-06-03

## 2024-06-03 NOTE — TELEPHONE ENCOUNTER
Pt calling in regards to medication. She is requesting provider sent in a short fill for her until tomorrow's appt.

## 2024-06-03 NOTE — TELEPHONE ENCOUNTER
Cuong Posada called requesting a refill on the following medications:  Requested Prescriptions     Pending Prescriptions Disp Refills    albuterol sulfate HFA (PROVENTIL;VENTOLIN;PROAIR) 108 (90 Base) MCG/ACT inhaler [Pharmacy Med Name: ALBUTEROL HFA (VENTOLIN) INH] 18 each 1     Sig: INHALE 2 PUFFS INTO THE LUNGS 4 TIMES DAILY AS NEEDED FOR WHEEZING.       Date of last visit: 1/15/2024  Date of next visit (if applicable):Visit date not found  Date of last refill: 1/2/2024  Pharmacy Name: Iv[facility] LPN     Will address at appointment

## 2024-06-04 ENCOUNTER — VIRTUAL VISIT (OUTPATIENT)
Dept: FAMILY MEDICINE | Facility: CLINIC | Age: 34
End: 2024-06-04
Payer: COMMERCIAL

## 2024-06-04 DIAGNOSIS — N94.6 DYSMENORRHEA: ICD-10-CM

## 2024-06-04 DIAGNOSIS — I10 HYPERTENSION GOAL BP (BLOOD PRESSURE) < 140/90: ICD-10-CM

## 2024-06-04 DIAGNOSIS — N64.4 BREAST PAIN: ICD-10-CM

## 2024-06-04 DIAGNOSIS — N94.6 DYSMENORRHEA: Primary | ICD-10-CM

## 2024-06-04 DIAGNOSIS — E66.01 MORBID OBESITY WITH BMI OF 50.0-59.9, ADULT (H): ICD-10-CM

## 2024-06-04 DIAGNOSIS — F11.90 CHRONIC, CONTINUOUS USE OF OPIOIDS: ICD-10-CM

## 2024-06-04 PROCEDURE — 99213 OFFICE O/P EST LOW 20 MIN: CPT | Mod: 95 | Performed by: FAMILY MEDICINE

## 2024-06-04 RX ORDER — OXYCODONE HYDROCHLORIDE 10 MG/1
10 TABLET ORAL 2 TIMES DAILY PRN
Qty: 40 TABLET | Refills: 0 | Status: CANCELLED | OUTPATIENT
Start: 2024-06-04

## 2024-06-04 RX ORDER — OXYCODONE HYDROCHLORIDE 10 MG/1
10 TABLET ORAL 2 TIMES DAILY PRN
Qty: 40 TABLET | Refills: 0 | Status: SHIPPED | OUTPATIENT
Start: 2024-06-04 | End: 2024-06-28

## 2024-06-04 ASSESSMENT — PATIENT HEALTH QUESTIONNAIRE - PHQ9
10. IF YOU CHECKED OFF ANY PROBLEMS, HOW DIFFICULT HAVE THESE PROBLEMS MADE IT FOR YOU TO DO YOUR WORK, TAKE CARE OF THINGS AT HOME, OR GET ALONG WITH OTHER PEOPLE: NOT DIFFICULT AT ALL
SUM OF ALL RESPONSES TO PHQ QUESTIONS 1-9: 7
SUM OF ALL RESPONSES TO PHQ QUESTIONS 1-9: 7

## 2024-06-04 NOTE — PROGRESS NOTES
"    Instructions Relayed to Patient by Virtual Roomer:     Patient is active on [employer]:   Relayed following to patient: \"It looks like you are active on [employer], are you able to join the visit this way? If not, do you need us to send you a link now or would you like your provider to send a link via text or email when they are ready to initiate the visit?\"      Patient Confirmed they will join visit via: Text Link to Cell Phone  Reminded patient to ensure they were logged on to virtual visit by arrival time listed.   Asked if patient has flexibility to initiate visit sooner than arrival time: patient is unable to initiate visit earlier than arrival time     If pediatric virtual visit, ensured pediatric patient along with parent/guardian will be present for video visit.     Patient offered the website [email or web address] and/or phone number to [employer] Help line: [phone]    Jigna is a 34 year old who is being evaluated via a billable video visit.    How would you like to obtain your AVS? [employer]  If the video visit is dropped, the invitation should be resent by: Text to cell phone: [phone]  Will anyone else be joining your video visit? No      Assessment & Plan     Dysmenorrhea  Longstanding ongoing issue.  Stable and up-to-date on routine monitoring.  Will be due for updated controlled substance agreement we are set up to see each other in July already.  - oxyCODONE IR (ROXICODONE) 10 MG tablet; Take 1 tablet (10 mg) by mouth 2 times daily as needed for pain    Breast pain  As above  - oxyCODONE IR (ROXICODONE) 10 MG tablet; Take 1 tablet (10 mg) by mouth 2 times daily as needed for pain    Chronic, continuous use of opioids  As above    Morbid obesity with BMI of 50.0-59.9, adult (H)  I have counseled her on diet and exercise to help lose weight    Hypertension goal BP (blood pressure) < 140/90  Upcoming visit in July to recheck          BMI  Estimated body mass index is 49.24 kg/m  as " "calculated from the following:    Height as of 12/11/23: 1.676 m (5' 6\").    Weight as of 12/11/23: 138.4 kg (305 lb 1.6 oz).   Weight management plan: Discussed healthy diet and exercise guidelines      Work on weight loss  Regular exercise  See Patient Instructions    Subjective   Jigna is a 34 year old, presenting for the following health issues:  Recheck Medication      6/4/2024     1:17 PM   Additional Questions   Roomed by Delisa FLORES   Accompanied by Self         6/4/2024     1:17 PM   Patient Reported Additional Medications   Patient reports taking the following new medications None     Video Start Time:  1719    History of Present Illness       Reason for visit:  Medication refill    She eats 0-1 servings of fruits and vegetables daily.She consumes 1 sweetened beverage(s) daily.She exercises with enough effort to increase her heart rate 9 or less minutes per day.  She exercises with enough effort to increase her heart rate 3 or less days per week.   She is taking medications regularly.         Video visit with patient today to follow-up on ongoing pain issues.      Review of Systems  Constitutional, HEENT, cardiovascular, pulmonary, gi and gu systems are negative, except as otherwise noted.      Objective           Vitals:  No vitals were obtained today due to virtual visit.    Physical Exam   GENERAL: alert and no distress  EYES: Eyes grossly normal to inspection.  No discharge or erythema, or obvious scleral/conjunctival abnormalities.  RESP: No audible wheeze, cough, or visible cyanosis.    SKIN: Visible skin clear. No significant rash, abnormal pigmentation or lesions.  NEURO: Cranial nerves grossly intact.  Mentation and speech appropriate for age.  PSYCH: Appropriate affect, tone, and pace of words    Past labs reviewed with the patient.       Video-Visit Details    Type of service:  Video Visit   Video End Time: 1725  Originating Location (pt. Location): Home    Distant Location (provider location):  " Off-site  Platform used for Video Visit: Ruddy  Signed Electronically by: Barbara Esquivel MD

## 2024-06-07 ENCOUNTER — PATIENT OUTREACH (OUTPATIENT)
Dept: CARE COORDINATION | Facility: CLINIC | Age: 34
End: 2024-06-07
Payer: COMMERCIAL

## 2024-06-07 NOTE — PROGRESS NOTES
Clinic Care Coordination Contact  Program:   [facility]: [place]    Renewal: UCARE   Date Applied:      CHA Outreach:   6/7/24: 1st outreach attempt. Left a message on voicemail with call back information and requested return call.  Plan: CTA will call again within 2 weeks.  Fabby Dalton  Care   [facility]  Clinic Care Coordination  [phone]      Health Insurance:        Referral/Screening:

## 2024-06-24 ENCOUNTER — PATIENT OUTREACH (OUTPATIENT)
Dept: CARE COORDINATION | Facility: CLINIC | Age: 34
End: 2024-06-24
Payer: COMMERCIAL

## 2024-06-24 NOTE — PROGRESS NOTES
Clinic Care Coordination Contact  Program:   [facility]: [place]    Renewal: UCARE   Date Applied:      [facility] Outreach:   6/24/24: 2nd outreach attempt. Left message on voicemail indicating last outreach attempt. CTA left [facility] number for renewal follow up.  Plan: CTA will no longer make outreach  PHONE IS BUSY.   Fabby Dalton  Care   [facility]  Clinic Care Coordination  [phone]    6/7/24: 1st outreach attempt. Left a message on voicemail with call back information and requested return call.  Plan: CTA will call again within 2 weeks.  PHONE IS BUSY.   Fabby Rondon   [facility]  Clinic Care Coordination  [phone]      Health Insurance:        Referral/Screening:

## 2024-06-28 DIAGNOSIS — N64.4 BREAST PAIN: ICD-10-CM

## 2024-06-28 DIAGNOSIS — N94.6 DYSMENORRHEA: ICD-10-CM

## 2024-06-28 RX ORDER — OXYCODONE HYDROCHLORIDE 10 MG/1
10 TABLET ORAL 2 TIMES DAILY PRN
Qty: 40 TABLET | Refills: 0 | Status: SHIPPED | OUTPATIENT
Start: 2024-06-28 | End: 2024-07-25

## 2024-06-28 NOTE — TELEPHONE ENCOUNTER
Medication Question or Refill    Contacts       Contact Date/Time Type Contact Phone/Fax    06/28/2024 02:35 PM CDT Phone (Incoming) Jigna Amador (Self) [phone] (M)            What medication are you calling about (include dose and sig)?: oxicodeno    Preferred Pharmacy:[facility]/pharmacy #[id number] - [place], [place] - [address] [address]  Phone: [phone] Fax: [phone]      Controlled Substance Agreement on file:   CSA -- Patient Level:     [Media Unavailable] Controlled Substance Agreement - Opioid - Scan on 12/12/2023  9:44 AM: CSA   [Media Unavailable] Controlled Substance Agreement - Opioid - Scan on 12/21/2022  2:23 PM: Opioid   [Media Unavailable] Controlled Substance Agreement - Opioid - Scan on 6/25/2021  2:59 PM   [Media Unavailable] Controlled Substance Agreement - Opioid - Scan on 3/3/2020  7:08 AM: Opioid       Who prescribed the medication?: pcp    Do you need a refill? Yes    When did you use the medication last? 6/26/2024    Patient offered an appointment? No    Do you have any questions or concerns?  Yes: has no pills        Could we send this information to you in [facility] or would you prefer to receive a phone call?:   Patient would prefer a phone call   Okay to leave a detailed message?: Yes at Home number on file [phone] (home)

## 2024-07-25 DIAGNOSIS — N94.6 DYSMENORRHEA: ICD-10-CM

## 2024-07-25 DIAGNOSIS — N64.4 BREAST PAIN: ICD-10-CM

## 2024-07-25 RX ORDER — OXYCODONE HYDROCHLORIDE 10 MG/1
10 TABLET ORAL 2 TIMES DAILY PRN
Qty: 40 TABLET | Refills: 0 | Status: SHIPPED | OUTPATIENT
Start: 2024-07-25 | End: 2024-08-28

## 2024-07-25 NOTE — TELEPHONE ENCOUNTER
Pt calling early for a refill of her medication. States that she is out of med.    BOBY LondonN, RN  [facility]

## 2024-07-26 NOTE — TELEPHONE ENCOUNTER
Pt calling to ask about medication refill. Advised pt that it was sent to pharmacy yesterday. Advised pt that she does need to schedule an appointment with provider for future refills. Pt verbalized understanding. Offered to assist pt with scheduling appointment. Pt declined.     Thank you - Aruna Verduzco, BOBYN, RN

## 2024-08-27 DIAGNOSIS — N94.6 DYSMENORRHEA: ICD-10-CM

## 2024-08-27 DIAGNOSIS — N64.4 BREAST PAIN: ICD-10-CM

## 2024-08-28 RX ORDER — OXYCODONE HYDROCHLORIDE 10 MG/1
10 TABLET ORAL 2 TIMES DAILY PRN
Qty: 40 TABLET | Refills: 0 | Status: SHIPPED | OUTPATIENT
Start: 2024-08-28 | End: 2024-09-27

## 2024-09-15 ENCOUNTER — HEALTH MAINTENANCE LETTER (OUTPATIENT)
Age: 34
End: 2024-09-15

## 2024-09-27 DIAGNOSIS — N64.4 BREAST PAIN: ICD-10-CM

## 2024-09-27 DIAGNOSIS — N94.6 DYSMENORRHEA: ICD-10-CM

## 2024-09-27 RX ORDER — OXYCODONE HYDROCHLORIDE 10 MG/1
10 TABLET ORAL 2 TIMES DAILY PRN
Qty: 40 TABLET | Refills: 0 | Status: SHIPPED | OUTPATIENT
Start: 2024-09-27

## 2024-09-27 NOTE — TELEPHONE ENCOUNTER
Medication Question or Refill        What medication are you calling about (include dose and sig)?: Oxycodone 10 MG     Preferred Pharmacy:   [facility]/pharmacy #[id number] - [place], [place] - [address]  [place] [address]  Phone: [phone] Fax: [phone]        Controlled Substance Agreement on file:   CSA -- Patient Level:     [Media Unavailable] Controlled Substance Agreement - Opioid - Scan on 12/12/2023  9:44 AM: CSA   [Media Unavailable] Controlled Substance Agreement - Opioid - Scan on 12/21/2022  2:23 PM: Opioid   [Media Unavailable] Controlled Substance Agreement - Opioid - Scan on 6/25/2021  2:59 PM   [Media Unavailable] Controlled Substance Agreement - Opioid - Scan on 3/3/2020  7:08 AM: Opioid       Who prescribed the medication?: Barbara Esquivel     Do you need a refill? Yes

## 2024-10-15 ENCOUNTER — VIRTUAL VISIT (OUTPATIENT)
Dept: FAMILY MEDICINE | Facility: CLINIC | Age: 34
End: 2024-10-15
Payer: COMMERCIAL

## 2024-10-15 DIAGNOSIS — F43.21 GRIEF REACTION: ICD-10-CM

## 2024-10-15 DIAGNOSIS — N94.6 DYSMENORRHEA: ICD-10-CM

## 2024-10-15 DIAGNOSIS — I82.4Y2 ACUTE DEEP VEIN THROMBOSIS (DVT) OF PROXIMAL VEIN OF LEFT LOWER EXTREMITY (H): ICD-10-CM

## 2024-10-15 DIAGNOSIS — N64.4 BREAST PAIN: Primary | ICD-10-CM

## 2024-10-15 DIAGNOSIS — I10 HYPERTENSION GOAL BP (BLOOD PRESSURE) < 140/90: ICD-10-CM

## 2024-10-15 DIAGNOSIS — E66.01 MORBID OBESITY WITH BMI OF 50.0-59.9, ADULT (H): ICD-10-CM

## 2024-10-15 DIAGNOSIS — F11.90 CHRONIC, CONTINUOUS USE OF OPIOIDS: ICD-10-CM

## 2024-10-15 DIAGNOSIS — Z13.220 SCREENING CHOLESTEROL LEVEL: ICD-10-CM

## 2024-10-15 PROCEDURE — G2211 COMPLEX E/M VISIT ADD ON: HCPCS | Mod: 95 | Performed by: FAMILY MEDICINE

## 2024-10-15 PROCEDURE — 99214 OFFICE O/P EST MOD 30 MIN: CPT | Mod: 95 | Performed by: FAMILY MEDICINE

## 2024-10-15 RX ORDER — CYCLOBENZAPRINE HCL 10 MG
10 TABLET ORAL 3 TIMES DAILY PRN
Qty: 40 TABLET | Refills: 2 | Status: SHIPPED | OUTPATIENT
Start: 2024-10-15

## 2024-10-15 RX ORDER — OXYCODONE HYDROCHLORIDE 10 MG/1
10 TABLET ORAL 2 TIMES DAILY PRN
Qty: 60 TABLET | Refills: 0 | Status: SHIPPED | OUTPATIENT
Start: 2024-10-15 | End: 2024-11-13

## 2024-10-15 ASSESSMENT — ANXIETY QUESTIONNAIRES
5. BEING SO RESTLESS THAT IT IS HARD TO SIT STILL: SEVERAL DAYS
GAD7 TOTAL SCORE: 10
3. WORRYING TOO MUCH ABOUT DIFFERENT THINGS: NEARLY EVERY DAY
4. TROUBLE RELAXING: NEARLY EVERY DAY
GAD7 TOTAL SCORE: 10
1. FEELING NERVOUS, ANXIOUS, OR ON EDGE: NOT AT ALL
6. BECOMING EASILY ANNOYED OR IRRITABLE: SEVERAL DAYS
8. IF YOU CHECKED OFF ANY PROBLEMS, HOW DIFFICULT HAVE THESE MADE IT FOR YOU TO DO YOUR WORK, TAKE CARE OF THINGS AT HOME, OR GET ALONG WITH OTHER PEOPLE?: SOMEWHAT DIFFICULT
GAD7 TOTAL SCORE: 10
7. FEELING AFRAID AS IF SOMETHING AWFUL MIGHT HAPPEN: MORE THAN HALF THE DAYS
7. FEELING AFRAID AS IF SOMETHING AWFUL MIGHT HAPPEN: MORE THAN HALF THE DAYS
2. NOT BEING ABLE TO STOP OR CONTROL WORRYING: NOT AT ALL
IF YOU CHECKED OFF ANY PROBLEMS ON THIS QUESTIONNAIRE, HOW DIFFICULT HAVE THESE PROBLEMS MADE IT FOR YOU TO DO YOUR WORK, TAKE CARE OF THINGS AT HOME, OR GET ALONG WITH OTHER PEOPLE: SOMEWHAT DIFFICULT

## 2024-10-15 ASSESSMENT — ENCOUNTER SYMPTOMS: BACK PAIN: 1

## 2024-10-15 NOTE — PROGRESS NOTES
"  If patient has telephone visit, have they been educated on video visit as preferred visit method and offered to change to video visit? N/A        Instructions Relayed to Patient by Virtual Roomer:     Patient is active on [employer]:   Relayed following to patient: \"It looks like you are active on [employer], are you able to join the visit this way? If not, do you need us to send you a link now or would you like your provider to send a link via text or email when they are ready to initiate the visit?\"      Patient Confirmed they will join visit via: Email   Reminded patient to ensure they were logged on to virtual visit by arrival time listed.   Asked if patient has flexibility to initiate visit sooner than arrival time: patient is unable to initiate visit earlier than arrival time     If pediatric virtual visit, ensured pediatric patient along with parent/guardian will be present for video visit.     Patient offered the website [email or web address] and/or phone number to [employer] Help line: [phone]      Jigna is a 34 year old who is being evaluated via a billable video visit.    How would you like to obtain your AVS? [employer]  If the video visit is dropped, the invitation should be resent by: Send to e-mail at: [email or web address]  Will anyone else be joining your video visit? No      Assessment & Plan     Breast pain  Contributors to struggle with pain in a variety of locations.  Up-to-date on database monitoring and controlled substance agreement but needs urine drug screen with next lab work.  Will continue to work to at least monitor but reduce dependence.  Okay to temporarily bump up quantity.  - oxyCODONE IR (ROXICODONE) 10 MG tablet; Take 1 tablet (10 mg) by mouth 2 times daily as needed for pain.    Dysmenorrhea  As above  - oxyCODONE IR (ROXICODONE) 10 MG tablet; Take 1 tablet (10 mg) by mouth 2 times daily as needed for pain.  - cyclobenzaprine (FLEXERIL) 10 MG tablet; Take 1 tablet " (10 mg) by mouth 3 times daily as needed for muscle spasms.    Chronic, continuous use of opioids  As above  - Urine Drug Screen; Future    Hypertension goal BP (blood pressure) < 140/90  Says she has lost some weight but not monitoring blood pressure.  I will set up an in office visit in about a month or so to recheck including lab work  - Basic metabolic panel; Future    Morbid obesity with BMI of 50.0-59.9, adult (H)  As above  - Hemoglobin A1c; Future    Grief reaction  Stable on current regimen.  Continue same plan and routine follow-up.   - sertraline (ZOLOFT) 50 MG tablet; Take 1 tablet (50 mg) by mouth daily.    Acute deep vein thrombosis (DVT) of proximal vein of left lower extremity (H)  This was summer 2023.  She never took the blood thinners that she was supposed to take due to cost but at this point it has been over a year and I do not think it is an issue.    Screening cholesterol level    - Lipid panel reflex to direct LDL Fasting; Future          Nicotine/Tobacco Cessation  She reports that she has been smoking cigarettes. She has a 2.5 pack-year smoking history. She has been exposed to tobacco smoke. She has never used smokeless tobacco.  Nicotine/Tobacco Cessation Plan  Self help information given to patient        Work on weight loss  Regular exercise  See Patient Instructions    Subjective   Jigna is a 34 year old, presenting for the following health issues:  Back Pain        10/15/2024     9:13 AM   Additional Questions   Roomed by Kerry MANRIQUEZ   Accompanied by Self     Video Start Time:  1020    History of Present Illness       Back Pain:  She presents for follow up of back pain. Patient's back pain is a chronic problem.  Location of back pain:  Right lower back and left lower back  Description of back pain: sharp and stabbing  Back pain spreads: right buttocks, left buttocks, right thigh and left thigh    Since patient first noticed back pain, pain is: gradually worsening  Does back pain interfere  with her job:  Yes       She eats 0-1 servings of fruits and vegetables daily.She consumes 1 sweetened beverage(s) daily.She exercises with enough effort to increase her heart rate 9 or less minutes per day.  She exercises with enough effort to increase her heart rate 3 or less days per week.   She is taking medications regularly.         Video visit patient today to follow-up on ongoing issues as above.      Review of Systems  Constitutional, HEENT, cardiovascular, pulmonary, gi and gu systems are negative, except as otherwise noted.      Objective           Vitals:  No vitals were obtained today due to virtual visit.    Physical Exam   GENERAL: alert and no distress  EYES: Eyes grossly normal to inspection.  No discharge or erythema, or obvious scleral/conjunctival abnormalities.  RESP: No audible wheeze, cough, or visible cyanosis.    SKIN: Visible skin clear. No significant rash, abnormal pigmentation or lesions.  NEURO: Cranial nerves grossly intact.  Mentation and speech appropriate for age.  PSYCH: Appropriate affect, tone, and pace of words    Past labs reviewed with the patient.     The longitudinal plan of care for the diagnosis(es)/condition(s) as documented were addressed during this visit. Due to the added complexity in care, I will continue to support Jigna in the subsequent management and with ongoing continuity of care.      Video-Visit Details    Type of service:  Video Visit   Video End Time: 1027  Originating Location (pt. Location): Home    Distant Location (provider location):  On-site  Platform used for Video Visit: Ruddy  Signed Electronically by: Barbara Esquivel MD

## 2024-10-22 ENCOUNTER — TELEPHONE (OUTPATIENT)
Dept: FAMILY MEDICINE | Facility: CLINIC | Age: 34
End: 2024-10-22
Payer: COMMERCIAL

## 2024-10-22 NOTE — TELEPHONE ENCOUNTER
I did increase the quantity a little bit at our recent visit.  But I am not planning on going any higher than that.  So, no, I do not think a same-day is warranted if it is simply to talk about increase in pain medication because I am not going to increase.  We are trying to decrease the use of pain medication, not increase it.

## 2024-10-22 NOTE — TELEPHONE ENCOUNTER
First attempt. Left message for patient to return call to clinic per provider message as shown:        RN/team- if/when patient returns call, please review provider message as shown and assist in scheduling virtual visit. Kalen Rojas RN, BSN

## 2024-10-22 NOTE — TELEPHONE ENCOUNTER
Patient called wanted to talk to Dr Esquivel. Asked if nurse or I could help. She asked about increase in pain meds - checked 60 tablets sent on 10/15. .She is requesting a follow up appointment with Dr Esquivel end of Oct. Is it OK to use same day? 20min or 40min ?

## 2024-11-07 ASSESSMENT — ANXIETY QUESTIONNAIRES: GAD7 TOTAL SCORE: 14

## 2024-11-13 ENCOUNTER — TELEPHONE (OUTPATIENT)
Dept: FAMILY MEDICINE | Facility: CLINIC | Age: 34
End: 2024-11-13
Payer: COMMERCIAL

## 2024-11-13 DIAGNOSIS — N64.4 BREAST PAIN: ICD-10-CM

## 2024-11-13 DIAGNOSIS — N94.6 DYSMENORRHEA: ICD-10-CM

## 2024-11-13 RX ORDER — OXYCODONE HYDROCHLORIDE 10 MG/1
10 TABLET ORAL 2 TIMES DAILY PRN
Qty: 60 TABLET | Refills: 0 | Status: SHIPPED | OUTPATIENT
Start: 2024-11-13

## 2024-11-13 NOTE — TELEPHONE ENCOUNTER
Medication Question or Refill    Contacts       Contact Date/Time Type Contact Phone/Fax    11/13/2024 11:45 AM CST Phone (Incoming) Jigna Amador (Self) [phone] (M)            What medication are you calling about (include dose and sig)?: Percocet 10 mg    Preferred Pharmacy:   [facility]/pharmacy #[id number] - [place] - [address] [address]  Phone: [phone] Fax: [phone]          Controlled Substance Agreement on file:   CSA -- Patient Level:     [Media Unavailable] Controlled Substance Agreement - Opioid - Scan on 12/12/2023  9:44 AM: CSA   [Media Unavailable] Controlled Substance Agreement - Opioid - Scan on 12/21/2022  2:23 PM: Opioid   [Media Unavailable] Controlled Substance Agreement - Opioid - Scan on 6/25/2021  2:59 PM   [Media Unavailable] Controlled Substance Agreement - Opioid - Scan on 3/3/2020  7:08 AM: Opioid       Who prescribed the medication?: Dr. Esquivel    Do you need a refill? Yes    When did you use the medication last? This morning    Patient offered an appointment? No    Do you have any questions or concerns?  No      Could we send this information to you in [facility] or would you prefer to receive a phone call?:   Patient would prefer a phone call   Okay to leave a detailed message?: Yes at Cell number on file:    Telephone Information:   Mobile [phone]

## 2024-11-13 NOTE — TELEPHONE ENCOUNTER
At her visit last month we set up lab work including a urine drug screen.  She needs to schedule that lab appointment and then I will send in refills  I am also going to send her a new controlled substance agreement that she can sign and drop off

## 2024-11-13 NOTE — LETTER

## 2024-11-14 NOTE — TELEPHONE ENCOUNTER
Patient is calling about Oxycodone IR 10 mg, states she was told yesterday that if she get lab appointment scheduled she can get her refill. Patient states pharmacy informed her she cannot get medication filled until Monday.     Writer informed provider sent prescription on 11-13-24 to pharmacy but pharmacies have to follow rules on when they can filled medications based on last  dates. Patient states she does not have any medication left.     Advise can send a request for early refill from provider but cannot guarantee approval. Advise will call patient back with update once provider responds.    RN: Patient request call back at [phone]    TK Echols  [facility]

## 2024-11-15 NOTE — TELEPHONE ENCOUNTER
Can we call the pharmacy.  Her last refill was on October 15 so it should be due on November 14.  60 tablets equals 30-day supply

## 2024-11-15 NOTE — TELEPHONE ENCOUNTER
Called pharmacy - per pharmacy, even though prescription was filled on October 15th, wasn't picked up by patient until October 22nd. Pharmacist states patient picked up 60 tabs on that date.     Attempted to reach patient to discuss this, unable to get a hold of patient. Per message below, patient mentioned that she is out of medication, but if picked up on the 22nd, should still have supply.      Routing to provider to advise if OK for early refill      Chana Diane RN, BSN  [facility]

## 2024-12-11 ENCOUNTER — TELEPHONE (OUTPATIENT)
Dept: FAMILY MEDICINE | Facility: CLINIC | Age: 34
End: 2024-12-11
Payer: COMMERCIAL

## 2024-12-11 DIAGNOSIS — N94.6 DYSMENORRHEA: ICD-10-CM

## 2024-12-11 DIAGNOSIS — N64.4 BREAST PAIN: ICD-10-CM

## 2024-12-11 RX ORDER — OXYCODONE HYDROCHLORIDE 10 MG/1
10 TABLET ORAL 2 TIMES DAILY PRN
Qty: 60 TABLET | Refills: 0 | Status: SHIPPED | OUTPATIENT
Start: 2024-12-16
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
## 2024-12-11 NOTE — TELEPHONE ENCOUNTER
Patient notified of provider message as noted:        Patient verbalized understanding and agrees with plan. Advised to call with questions or concerns. Kalen Rojas RN, BSN

## 2024-12-11 NOTE — TELEPHONE ENCOUNTER
Pt calling re: medication refill request for Oxycodone IR 10mg tabs.    Advised pt that a msg was already sent to her PCP so will await response.    Will notify her when PCP replies. Pt verbalized understanding.    Karely Carolina, RN, BSN  [facility] Triage

## 2024-12-16 ENCOUNTER — LAB (OUTPATIENT)
Dept: LAB | Facility: CLINIC | Age: 34
End: 2024-12-16

## 2024-12-16 DIAGNOSIS — E66.01 MORBID OBESITY WITH BMI OF 50.0-59.9, ADULT (H): ICD-10-CM

## 2024-12-16 DIAGNOSIS — F11.90 CHRONIC, CONTINUOUS USE OF OPIOIDS: ICD-10-CM

## 2024-12-16 DIAGNOSIS — I10 HYPERTENSION GOAL BP (BLOOD PRESSURE) < 140/90: ICD-10-CM

## 2024-12-16 DIAGNOSIS — Z13.220 SCREENING CHOLESTEROL LEVEL: ICD-10-CM

## 2024-12-16 PROCEDURE — 80307 DRUG TEST PRSMV CHEM ANLYZR: CPT

## 2024-12-16 NOTE — LETTER
December 17, 2024      Jignanelli Amador  [address]  [place] [address]        Dear ,    We are writing to inform you of your test results.    I was very disappointed to see the results of your most recent urine drug screen which showed both cocaine and fentanyl.  As this is certainly a violation of our agreement regarding the use of pain medications, I will no longer be providing you any narcotic pain medication.  I can certainly continue to care for you overall but this will not involve the use of narcotic pain medication.  And if you feel that you may have an issue with substance abuse I would be happy to provide resources to help you manage this issue.     Resulted Orders   Urine Drug Screen Panel   Result Value Ref Range    Amphetamines Urine Screen Negative Screen Negative      Comment:      Cutoff for a negative amphetamine is less than 500 ng/mL.    Barbituates Urine Screen Negative Screen Negative      Comment:      Cutoff for a negative barbiturate is less than 200 ng/mL.    Benzodiazepine Urine Screen Negative Screen Negative      Comment:      Cutoff for a negative benzodiazepine is less than 100 ng/mL.    Cannabinoids Urine Screen Positive (A) Screen Negative      Comment:      Cutoff for a positive cannabinoid is 50 ng/mL or greater.   This is an unconfirmed screening result to be used for medical purposes only.    Cocaine Urine Screen Positive (A) Screen Negative      Comment:      Cutoff for a positive cocaine is 300 ng/mL or greater.   This is an unconfirmed screening result to be used for medical purposes only.    Fentanyl Qual Urine Screen Positive (A) Screen Negative      Comment:      Cutoff for positive fentanyl is 5 ng/mL or greater.   This is an unconfirmed screening result to be used for medical purposes only.    Opiates Urine Screen Negative Screen Negative      Comment:      Cutoff for a negative opiate is less than 300 ng/mL.    PCP Urine Screen Negative Screen Negative       Comment:      Cutoff for a negative PCP is less than 25 ng/mL.       If you have any questions or concerns, please call the clinic at the number listed above.       Sincerely,      Barbara Esquivel MD

## 2024-12-17 DIAGNOSIS — F11.90 CHRONIC, CONTINUOUS USE OF OPIOIDS: Primary | ICD-10-CM

## 2024-12-17 DIAGNOSIS — F19.10 POLYSUBSTANCE ABUSE (H): ICD-10-CM

## 2024-12-17 LAB
AMPHETAMINES UR QL SCN: ABNORMAL
BARBITURATES UR QL SCN: ABNORMAL
BENZODIAZ UR QL SCN: ABNORMAL
BZE UR QL SCN: ABNORMAL
CANNABINOIDS UR QL SCN: ABNORMAL
FENTANYL UR QL: ABNORMAL
OPIATES UR QL SCN: ABNORMAL
PCP QUAL URINE (ROCHE): ABNORMAL

## 2024-12-17 NOTE — RESULT ENCOUNTER NOTE
Please mail results and note to patient:    Jigna,  I was very disappointed to see the results of your most recent urine drug screen which showed both cocaine and fentanyl.  As this is certainly a violation of our agreement regarding the use of pain medications, I will no longer be providing you any narcotic pain medication.  I can certainly continue to care for you overall but this will not involve the use of narcotic pain medication.  And if you feel that you may have an issue with substance abuse I would be happy to provide resources to help you manage this issue.  BERTHA Esquivel M.D.

## 2024-12-26 ENCOUNTER — TELEPHONE (OUTPATIENT)
Dept: FAMILY MEDICINE | Facility: CLINIC | Age: 34
End: 2024-12-26
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
## 2024-12-26 NOTE — TELEPHONE ENCOUNTER
Medication Question or Refill    Contacts       Contact Date/Time Type Contact Phone/Fax    12/26/2024 02:11 PM CST Phone (Incoming) Perry Jigna MONI (Self) [phone] (M)            What medication are you calling about (include dose and sig)?: Xanax    Preferred Pharmacy:   [id number] - [place] - [address] [address]  Phone: [phone] Fax: [phone]    [id number] - [facility], [place] - [address] [facility]  [address] [facility] [address]  Phone: [phone] Fax: [phone]    [id number] - [place], [place] - [address] [facility]  [address] [place] [address]  Phone: [phone] Fax: [phone]      Controlled Substance Agreement on file:   CSA -- Patient Level:     [Media Unavailable] Controlled Substance Agreement - Opioid - Scan on 12/12/2023  9:44 AM: CSA   [Media Unavailable] Controlled Substance Agreement - Opioid - Scan on 12/21/2022  2:23 PM: Opioid   [Media Unavailable] Controlled Substance Agreement - Opioid - Scan on 6/25/2021  2:59 PM   [Media Unavailable] Controlled Substance Agreement - Opioid - Scan on 3/3/2020  7:08 AM: Opioid       Who prescribed the medication?: PCP    Do you need a refill? Yes    When did you use the medication last? 12/22/24    Patient offered an appointment? No    Do you have any questions or concerns?  No      Okay to leave a detailed message?: Yes at Cell number on file:    Telephone Information:   Mobile [phone]

## 2024-12-30 NOTE — TELEPHONE ENCOUNTER
Barbara Esquivel MD message reviewed with patient as written:        Patient is requesting provider call her. Would like to talk you him about her urine results. Notified may need to make an appointment, patient asking if we can see if provider will call or if she needs appointment to discuss this further. Please advise. Kalen Rojas RN, BSN

## 2024-12-30 NOTE — TELEPHONE ENCOUNTER
No, I will not be filling any controlled substances for her any longer  I can still be her doctor for standard medical things but nothing involving those type of medications.

## 2025-01-16 DIAGNOSIS — I10 HYPERTENSION GOAL BP (BLOOD PRESSURE) < 140/90: ICD-10-CM

## 2025-01-16 RX ORDER — HYDROCHLOROTHIAZIDE 50 MG/1
50 TABLET ORAL DAILY
Qty: 90 TABLET | Refills: 0 | Status: SHIPPED | OUTPATIENT
Start: 2025-01-16

## 2025-03-11 ENCOUNTER — VIRTUAL VISIT (OUTPATIENT)
Dept: FAMILY MEDICINE | Facility: CLINIC | Age: 35
End: 2025-03-11

## 2025-03-11 DIAGNOSIS — F33.1 MODERATE RECURRENT MAJOR DEPRESSION (H): ICD-10-CM

## 2025-03-11 DIAGNOSIS — F19.10 POLYSUBSTANCE ABUSE (H): Primary | ICD-10-CM

## 2025-03-11 DIAGNOSIS — I10 HYPERTENSION GOAL BP (BLOOD PRESSURE) < 140/90: ICD-10-CM

## 2025-03-11 DIAGNOSIS — N64.4 BREAST PAIN: ICD-10-CM

## 2025-03-11 DIAGNOSIS — N94.6 DYSMENORRHEA: ICD-10-CM

## 2025-03-11 DIAGNOSIS — E66.01 MORBID OBESITY WITH BMI OF 50.0-59.9, ADULT (H): ICD-10-CM

## 2025-03-11 PROCEDURE — 98005 SYNCH AUDIO-VIDEO EST LOW 20: CPT | Performed by: FAMILY MEDICINE

## 2025-03-11 NOTE — PROGRESS NOTES
"  If patient has telephone visit, have they been educated on video visit as preferred visit method and offered to change to video visit? N/A        Instructions Relayed to Patient by Virtual Roomer:     Patient is active on [employer]:   Relayed following to patient: \"It looks like you are active on [employer], are you able to join the visit this way? If not, do you need us to send you a link now or would you like your provider to send a link via text or email when they are ready to initiate the visit?\"      Patient Confirmed they will join visit via: "[employer]  Reminded patient to ensure they were logged on to virtual visit by arrival time listed.   Asked if patient has flexibility to initiate visit sooner than arrival time: patient is unable to initiate visit earlier than arrival time     If pediatric virtual visit, ensured pediatric patient along with parent/guardian will be present for video visit.     Patient offered the website [email or web address] and/or phone number to [employer] Help line: [phone]      Jigna is a 35 year old who is being evaluated via a billable video visit.    How would you like to obtain your AVS? "[employer]  If the video visit is dropped, the invitation should be resent by: Text to cell phone: [phone]  Will anyone else be joining your video visit? Yes: Cousin. How would they like to receive their invitation? Text to cell phone: [phone]      Assessment & Plan     Polysubstance abuse (H)  Video visit with patient today.  In December she failed a urine drug screen with the presence of both fentanyl and cocaine.  At that time I told her that this violated our controlled substance agreement and I was no longer able to provide her narcotic pain medication.  I did offer her resources to help with drug abuse and addiction she declined.  She is contacting me today saying that I do that was a mistake where she did not realize that she had ingested those substances.  Was at some " type of party, etc.  I discussed with patient that it is just too high risk of the situation and I no longer feel comfortable providing that type of medication.  The issues that we are using it for relate to dysmenorrhea and breast pain and I did offer her resources to help treat those underlying issues rather than just mask them with pain medication.  We talked about hormonal manipulation and a potential referral to GYN but she declines.  I am still willing to provide her care for her other underlying issues but do not feel comfortable providing pain treatment in the way we had been before.    Dysmenorrhea  As above    Breast pain  As above    Hypertension goal BP (blood pressure) < 140/90  It has been sometime since we saw each other in the office.  Would like next visit to be that way so we can update lab work and her blood pressure readings    Morbid obesity with BMI of 50.0-59.9, adult (H)  As above    Moderate recurrent major depression (H)  Stable on current regimen.  Continue same plan and routine follow-up.   - sertraline (ZOLOFT) 50 MG tablet; Take 1 tablet (50 mg) by mouth daily.            Work on weight loss  Regular exercise  See Patient Instructions    Subjective   Jigna is a 35 year old, presenting for the following health issues:  Recheck Medication        3/11/2025     9:54 AM   Additional Questions   Roomed by Kerry MANRIQUEZ   Accompanied by Cousin     History of Present Illness       Hypertension: She presents for follow up of hypertension.  She does check blood pressure  regularly outside of the clinic. Outside blood pressures have been over 140/90. She follows a low salt diet.     She eats 2-3 servings of fruits and vegetables daily.She consumes 2 sweetened beverage(s) daily.She exercises with enough effort to increase her heart rate 20 to 29 minutes per day.  She exercises with enough effort to increase her heart rate 3 or less days per week.   She is taking medications regularly.          Video  visit with patient today to follow-up on pain      Review of Systems  Constitutional, HEENT, cardiovascular, pulmonary, gi and gu systems are negative, except as otherwise noted.      Objective           Vitals:  No vitals were obtained today due to virtual visit.    Physical Exam   GENERAL: alert and no distress  EYES: Eyes grossly normal to inspection.  No discharge or erythema, or obvious scleral/conjunctival abnormalities.  RESP: No audible wheeze, cough, or visible cyanosis.    SKIN: Visible skin clear. No significant rash, abnormal pigmentation or lesions.  NEURO: Cranial nerves grossly intact.  Mentation and speech appropriate for age.  PSYCH: Appropriate affect, tone, and pace of words    Past labs reviewed with the patient.     The longitudinal plan of care for the diagnosis(es)/condition(s) as documented were addressed during this visit. Due to the added complexity in care, I will continue to support Jigna in the subsequent management and with ongoing continuity of care.      Video-Visit Details    Type of service:  Video Visit   Originating Location (pt. Location): Home    Distant Location (provider location):  On-site  Platform used for Video Visit: Ruddy  Signed Electronically by: Barbara Esquivel MD